# Patient Record
Sex: MALE | Race: WHITE | NOT HISPANIC OR LATINO | Employment: FULL TIME | URBAN - METROPOLITAN AREA
[De-identification: names, ages, dates, MRNs, and addresses within clinical notes are randomized per-mention and may not be internally consistent; named-entity substitution may affect disease eponyms.]

---

## 2019-07-13 PROBLEM — E55.9 VITAMIN D DEFICIENCY: Status: ACTIVE | Noted: 2019-07-13

## 2019-07-16 ENCOUNTER — OFFICE VISIT (OUTPATIENT)
Dept: FAMILY MEDICINE CLINIC | Facility: CLINIC | Age: 54
End: 2019-07-16

## 2019-07-16 VITALS
HEART RATE: 87 BPM | DIASTOLIC BLOOD PRESSURE: 87 MMHG | BODY MASS INDEX: 27.81 KG/M2 | WEIGHT: 187.8 LBS | RESPIRATION RATE: 16 BRPM | OXYGEN SATURATION: 98 % | SYSTOLIC BLOOD PRESSURE: 137 MMHG | TEMPERATURE: 98.1 F | HEIGHT: 69 IN

## 2019-07-16 DIAGNOSIS — K64.4 SKIN TAGS, ANUS OR RECTUM: ICD-10-CM

## 2019-07-16 DIAGNOSIS — Z12.11 SCREENING FOR MALIGNANT NEOPLASM OF COLON: ICD-10-CM

## 2019-07-16 DIAGNOSIS — Z13.1 SCREENING FOR DIABETES MELLITUS: ICD-10-CM

## 2019-07-16 DIAGNOSIS — E55.9 VITAMIN D DEFICIENCY: ICD-10-CM

## 2019-07-16 DIAGNOSIS — Z13.220 SCREENING CHOLESTEROL LEVEL: ICD-10-CM

## 2019-07-16 DIAGNOSIS — Z12.5 SCREENING FOR MALIGNANT NEOPLASM OF PROSTATE: ICD-10-CM

## 2019-07-16 DIAGNOSIS — Z11.59 ENCOUNTER FOR HEPATITIS C VIRUS SCREENING TEST FOR HIGH RISK PATIENT: ICD-10-CM

## 2019-07-16 DIAGNOSIS — Z11.4 SCREENING FOR HIV (HUMAN IMMUNODEFICIENCY VIRUS): Primary | ICD-10-CM

## 2019-07-16 DIAGNOSIS — Z91.89 ENCOUNTER FOR HEPATITIS C VIRUS SCREENING TEST FOR HIGH RISK PATIENT: ICD-10-CM

## 2019-07-16 PROCEDURE — 99203 OFFICE O/P NEW LOW 30 MIN: CPT | Performed by: PREVENTIVE MEDICINE

## 2019-07-16 RX ORDER — AMLODIPINE BESYLATE 10 MG/1
10 TABLET ORAL DAILY
Refills: 5 | COMMUNITY
Start: 2019-06-22

## 2019-07-16 RX ORDER — ALLOPURINOL 100 MG/1
100 TABLET ORAL DAILY
Refills: 6 | COMMUNITY
Start: 2019-06-22

## 2019-07-16 RX ORDER — LISINOPRIL 40 MG/1
40 TABLET ORAL DAILY
Refills: 5 | COMMUNITY
Start: 2019-06-22

## 2019-07-16 NOTE — PROGRESS NOTES
"Subjective   John Morales is a 53 y.o. male presents for   Chief Complaint   Patient presents with   • GI Problem     wants referral to Dr Kennedy       Health Maintenance Due   Topic Date Due   • ANNUAL PHYSICAL  10/01/1967   • ZOSTER VACCINE (1 of 2) 10/01/2014   • HEPATITIS C SCREENING  06/26/2019   • COLONOSCOPY  06/26/2019   • PROSTATE CANCER SCREENING  07/13/2019       History of Present Illness     Vitals:    07/16/19 1440 07/16/19 1442   BP: 145/93 137/87   BP Location: Left arm Right arm   Patient Position: Sitting Sitting   Cuff Size: Adult Adult   Pulse: 87    Resp: 16    Temp: 98.1 °F (36.7 °C)    TempSrc: Oral    SpO2: 98%    Weight: 85.2 kg (187 lb 12.8 oz)    Height: 175.3 cm (69\")        Current Outpatient Medications on File Prior to Visit   Medication Sig Dispense Refill   • allopurinol (ZYLOPRIM) 100 MG tablet Take 100 mg by mouth Daily.  6   • amLODIPine (NORVASC) 10 MG tablet Take 10 mg by mouth Daily.  5   • lisinopril (PRINIVIL,ZESTRIL) 40 MG tablet Take 40 mg by mouth Daily.  5     No current facility-administered medications on file prior to visit.        The following portions of the patient's history were reviewed and updated as appropriate: allergies, current medications, past family history, past medical history, past social history, past surgical history and problem list.    Review of Systems   Constitutional: Negative.    HENT: Negative.  Negative for sinus pressure and sore throat.    Eyes: Negative.    Respiratory: Negative.  Negative for cough.    Cardiovascular: Negative.    Gastrointestinal: Positive for rectal pain.   Endocrine: Negative.    Genitourinary: Negative.    Musculoskeletal: Negative.    Skin: Negative.    Allergic/Immunologic: Positive for environmental allergies.   Neurological: Negative.    Hematological: Negative.    Psychiatric/Behavioral: Negative.        Objective   Physical Exam   Constitutional: He is oriented to person, place, and time. He appears " pt has heart rate of 115 well-developed and well-nourished.   HENT:   Head: Normocephalic and atraumatic.   Eyes: Conjunctivae and EOM are normal. Pupils are equal, round, and reactive to light.   Neck: Normal range of motion. Neck supple.   Cardiovascular: Normal rate, regular rhythm, normal heart sounds and intact distal pulses.   Pulmonary/Chest: Effort normal and breath sounds normal.   Abdominal: Soft. Bowel sounds are normal.   Musculoskeletal: Normal range of motion.   Neurological: He is alert and oriented to person, place, and time.   Skin: Skin is warm and dry.   Papilloma rectal and L groin adenopathy   Psychiatric: He has a normal mood and affect.   Nursing note and vitals reviewed.    PHQ-9 Total Score: 0    Assessment/Plan   John was seen today for gi problem.    Diagnoses and all orders for this visit:    Screening for HIV (human immunodeficiency virus)  -     HIV-1 & HIV-2 Antibodies    Encounter for hepatitis C virus screening test for high risk patient  -     Hepatitis C Antibody    Screening for malignant neoplasm of prostate  -     PSA Screen    Screening for malignant neoplasm of colon    Screening for diabetes mellitus  -     Comprehensive Metabolic Panel    Screening cholesterol level  -     Lipid Panel    Vitamin D deficiency  -     Vitamin D 25 Hydroxy        Patient Instructions   Followup General Surgeon for rectal tags and L groin swelling.  Recheck 3 months

## 2019-07-24 ENCOUNTER — OFFICE VISIT (OUTPATIENT)
Dept: SURGERY | Facility: CLINIC | Age: 54
End: 2019-07-24

## 2019-07-24 VITALS
SYSTOLIC BLOOD PRESSURE: 177 MMHG | TEMPERATURE: 98.3 F | HEART RATE: 90 BPM | OXYGEN SATURATION: 98 % | DIASTOLIC BLOOD PRESSURE: 102 MMHG | BODY MASS INDEX: 30.08 KG/M2 | HEIGHT: 66 IN | WEIGHT: 187.2 LBS

## 2019-07-24 DIAGNOSIS — K64.4 SKIN TAG OF PERIANAL REGION: Primary | ICD-10-CM

## 2019-07-24 PROCEDURE — 99204 OFFICE O/P NEW MOD 45 MIN: CPT | Performed by: SURGERY

## 2019-07-24 NOTE — PROGRESS NOTES
GENERAL SURGERY CONSULTATION NOTE    Consult requested by: Dr. Sterling    Patient Care Team:  Nora Sterling MD as PCP - General (Family Medicine)    Reason for consult: perianal skin tags    Subjective     Patient is a 53 y.o. male presents with perianal skin tags which have been present for the past couple of years. He states that was when he noticed the largest of these skin tags. A couple of the perianal skin tags have arisen within the last few months. He reports that occasionally he will have puss draining from these areas and will have bleeding both when having a BM and with wiping. He reports that he was seen by a Dermatologist last year who offered biopsy to rule out cancer, but this was not done. The patient has never had a diagnosis of HPV and does not engage in receptive anal intercourse. He is able to pass his bowels without difficulty. He has not had any bloating or changes in the character of his BMs. The patient also noted a small lump within his left inguinal crease. The patient is also in need of a screening colonoscopy as he has not had one and is 53 years old. Negative family history for cancer.    Review of Systems   Constitutional: Negative for appetite change, chills and fever.   HENT: Negative for congestion and sore throat.    Respiratory: Negative for cough and shortness of breath.    Cardiovascular: Negative for chest pain and palpitations.   Gastrointestinal: Positive for anal bleeding and rectal pain. Negative for abdominal pain, constipation, diarrhea, nausea, vomiting and GERD.   Genitourinary: Negative for difficulty urinating, dysuria and frequency.   Musculoskeletal: Negative for arthralgias and back pain.   Skin: Positive for skin lesions. Negative for rash.   Neurological: Negative for dizziness, seizures and memory problem.   Hematological: Positive for adenopathy. Does not bruise/bleed easily.   Psychiatric/Behavioral: Negative for sleep disturbance and depressed mood.         History  Past Medical History:   Diagnosis Date   • Ankle fracture     right ankle   • Back injury 07/01/2007    broke   • Hypertension      Past Surgical History:   Procedure Laterality Date   • ARM WOUND REPAIR / CLOSURE Left 2014    infection in bone     Family History   Problem Relation Age of Onset   • No Known Problems Mother    • No Known Problems Father    • No Known Problems Sister    • No Known Problems Brother      Social History     Tobacco Use   • Smoking status: Never Smoker   • Smokeless tobacco: Never Used   Substance Use Topics   • Alcohol use: No     Frequency: 2-4 times a month     Drinks per session: 5 or 6     Binge frequency: Never   • Drug use: No       (Not in a hospital admission)  Allergies:  Patient has no known allergies.    Objective     Vital Signs  Temp:  [98.3 °F (36.8 °C)] 98.3 °F (36.8 °C)  Heart Rate:  [90] 90  BP: (177)/(102) 177/102    Physical Exam   Constitutional: He is oriented to person, place, and time. He appears well-developed and well-nourished.   HENT:   Head: Normocephalic and atraumatic.   Eyes: Pupils are equal, round, and reactive to light.   Neck: Normal range of motion.   Cardiovascular: Normal rate and regular rhythm.   Pulmonary/Chest: Effort normal and breath sounds normal.   Abdominal: Soft. He exhibits no distension. There is no tenderness. No hernia.   Genitourinary: Rectal exam shows no mass, anal tone normal and guaiac negative stool.   Genitourinary Comments: Patient has multiple skin tags of various sizes, some are pedunculated, some are more sessile. These are flesh colored, no ulcerations, bleeding or pus noted.    Musculoskeletal: Normal range of motion. He exhibits no edema.   Lymphadenopathy:     He has no cervical adenopathy.     He has no axillary adenopathy.        Left: No inguinal adenopathy present.   Shotty left inguinal lymphadenopathy   Neurological: He is alert and oriented to person, place, and time.   Skin: Skin is warm and dry.  No rash noted.   Psychiatric: He has a normal mood and affect.       Results Review:   Lab Results (last 24 hours)     ** No results found for the last 24 hours. **        No radiology results for the last day      I reviewed the patient's new imaging results and agree with the interpretation.  I reviewed the patient's other test results and agree with the interpretation    Assessment/Plan     Active Problems:  Perianal skin tags    Refer to GI for screening colonoscopy.    Hopefully during his screening colonoscopy I can perform an excisional biopsy on these skin tags. Discussed with patient that I have low suspicion for anal cancer given their appearance, but biopsy is the only way to definitively rule out anal cancer. He does have some inguinal lymphadenopathy which is non-specific. The risks, benefits, and alternatives to excision was discussed with the patient who agrees to proceed.    Robert Stinson MD  07/24/19  1:35 PM

## 2019-07-24 NOTE — PATIENT INSTRUCTIONS
Will attempt excision at same time as colonoscopy. If unable to coordinate, may need 2 separate procedures.

## 2019-07-31 ENCOUNTER — TELEPHONE (OUTPATIENT)
Dept: SURGERY | Facility: CLINIC | Age: 54
End: 2019-07-31

## 2019-07-31 RX ORDER — SODIUM CHLORIDE 9 MG/ML
100 INJECTION, SOLUTION INTRAVENOUS CONTINUOUS
Status: CANCELLED | OUTPATIENT
Start: 2019-07-31

## 2019-07-31 RX ORDER — CHLORHEXIDINE GLUCONATE 0.12 MG/ML
15 RINSE ORAL EVERY 12 HOURS SCHEDULED
Status: CANCELLED | OUTPATIENT
Start: 2019-07-31

## 2019-07-31 NOTE — TELEPHONE ENCOUNTER
Patient stopped in to inquire about his surgery to be scheduled, he has a green packet for the GI referral.  Can you put in a case request for your portion of the combined case.   Thanks    Done  Robert Stinson MD  7/31/2019  7:05 PM

## 2019-08-07 PROBLEM — K64.4 SKIN TAG OF PERIANAL REGION: Status: ACTIVE | Noted: 2019-08-07

## 2019-09-19 ENCOUNTER — APPOINTMENT (OUTPATIENT)
Dept: PREADMISSION TESTING | Facility: HOSPITAL | Age: 54
End: 2019-09-19

## 2019-09-19 VITALS
OXYGEN SATURATION: 98 % | DIASTOLIC BLOOD PRESSURE: 94 MMHG | SYSTOLIC BLOOD PRESSURE: 154 MMHG | HEIGHT: 67 IN | WEIGHT: 190 LBS | HEART RATE: 78 BPM | BODY MASS INDEX: 29.82 KG/M2

## 2019-09-19 LAB
ANION GAP SERPL CALCULATED.3IONS-SCNC: 13.8 MMOL/L (ref 5–15)
BUN BLD-MCNC: 11 MG/DL (ref 8–20)
BUN/CREAT SERPL: 12.2 (ref 6.2–20.3)
CALCIUM SPEC-SCNC: 9.2 MG/DL (ref 8.9–10.3)
CHLORIDE SERPL-SCNC: 103 MMOL/L (ref 101–111)
CO2 SERPL-SCNC: 25 MMOL/L (ref 22–32)
CREAT BLD-MCNC: 0.9 MG/DL (ref 0.7–1.2)
DEPRECATED RDW RBC AUTO: 41.6 FL (ref 37–54)
ERYTHROCYTE [DISTWIDTH] IN BLOOD BY AUTOMATED COUNT: 13.1 % (ref 12.3–15.4)
GFR SERPL CREATININE-BSD FRML MDRD: 88 ML/MIN/1.73
GLUCOSE BLD-MCNC: 94 MG/DL (ref 65–99)
HCT VFR BLD AUTO: 44.9 % (ref 37.5–51)
HGB BLD-MCNC: 15.2 G/DL (ref 13–17.7)
MCH RBC QN AUTO: 30.1 PG (ref 26.6–33)
MCHC RBC AUTO-ENTMCNC: 33.7 G/DL (ref 31.5–35.7)
MCV RBC AUTO: 89.2 FL (ref 79–97)
PLATELET # BLD AUTO: 176 10*3/MM3 (ref 140–450)
PMV BLD AUTO: 9 FL (ref 6–12)
POTASSIUM BLD-SCNC: 3.8 MMOL/L (ref 3.6–5.1)
RBC # BLD AUTO: 5.03 10*6/MM3 (ref 4.14–5.8)
SODIUM BLD-SCNC: 138 MMOL/L (ref 136–144)
WBC NRBC COR # BLD: 11.2 10*3/MM3 (ref 3.4–10.8)

## 2019-09-19 PROCEDURE — 93005 ELECTROCARDIOGRAM TRACING: CPT

## 2019-09-19 PROCEDURE — 93010 ELECTROCARDIOGRAM REPORT: CPT | Performed by: INTERNAL MEDICINE

## 2019-09-19 PROCEDURE — 36415 COLL VENOUS BLD VENIPUNCTURE: CPT

## 2019-09-19 PROCEDURE — 80048 BASIC METABOLIC PNL TOTAL CA: CPT | Performed by: SURGERY

## 2019-09-19 PROCEDURE — 85027 COMPLETE CBC AUTOMATED: CPT | Performed by: SURGERY

## 2019-09-23 PROBLEM — R19.4 CHANGE IN BOWEL HABITS: Status: ACTIVE | Noted: 2019-09-23

## 2019-09-25 ENCOUNTER — ANESTHESIA EVENT (OUTPATIENT)
Dept: PERIOP | Facility: HOSPITAL | Age: 54
End: 2019-09-25

## 2019-09-26 ENCOUNTER — ANESTHESIA (OUTPATIENT)
Dept: PERIOP | Facility: HOSPITAL | Age: 54
End: 2019-09-26

## 2019-09-26 ENCOUNTER — ON CAMPUS - OUTPATIENT (OUTPATIENT)
Dept: URBAN - METROPOLITAN AREA HOSPITAL 85 | Facility: HOSPITAL | Age: 54
End: 2019-09-26

## 2019-09-26 ENCOUNTER — HOSPITAL ENCOUNTER (OUTPATIENT)
Facility: HOSPITAL | Age: 54
Setting detail: HOSPITAL OUTPATIENT SURGERY
Discharge: HOME | End: 2019-09-26
Attending: SURGERY | Admitting: INTERNAL MEDICINE

## 2019-09-26 VITALS
BODY MASS INDEX: 29 KG/M2 | RESPIRATION RATE: 16 BRPM | HEIGHT: 67 IN | HEART RATE: 84 BPM | WEIGHT: 184.75 LBS | OXYGEN SATURATION: 92 % | SYSTOLIC BLOOD PRESSURE: 158 MMHG | TEMPERATURE: 98.4 F | DIASTOLIC BLOOD PRESSURE: 96 MMHG

## 2019-09-26 DIAGNOSIS — K64.4 RESIDUAL HEMORRHOIDAL SKIN TAGS: ICD-10-CM

## 2019-09-26 DIAGNOSIS — K63.5 COLON POLYP: ICD-10-CM

## 2019-09-26 DIAGNOSIS — R19.4 CHANGE IN BOWEL HABIT: ICD-10-CM

## 2019-09-26 DIAGNOSIS — D12.3 BENIGN NEOPLASM OF TRANSVERSE COLON: ICD-10-CM

## 2019-09-26 DIAGNOSIS — K64.4 SKIN TAG OF PERIANAL REGION: ICD-10-CM

## 2019-09-26 PROCEDURE — 46220 EXCISE ANAL EXT TAG/PAPILLA: CPT | Performed by: SURGERY

## 2019-09-26 PROCEDURE — 88304 TISSUE EXAM BY PATHOLOGIST: CPT | Performed by: SURGERY

## 2019-09-26 PROCEDURE — 25010000002 PROPOFOL 10 MG/ML EMULSION: Performed by: ANESTHESIOLOGIST ASSISTANT

## 2019-09-26 PROCEDURE — 45385 COLONOSCOPY W/LESION REMOVAL: CPT | Performed by: INTERNAL MEDICINE

## 2019-09-26 PROCEDURE — S0260 H&P FOR SURGERY: HCPCS | Performed by: SURGERY

## 2019-09-26 PROCEDURE — 88305 TISSUE EXAM BY PATHOLOGIST: CPT | Performed by: INTERNAL MEDICINE

## 2019-09-26 RX ORDER — SODIUM CHLORIDE 0.9 % (FLUSH) 0.9 %
3 SYRINGE (ML) INJECTION EVERY 12 HOURS SCHEDULED
Status: DISCONTINUED | OUTPATIENT
Start: 2019-09-26 | End: 2019-09-26 | Stop reason: HOSPADM

## 2019-09-26 RX ORDER — PROPOFOL 10 MG/ML
VIAL (ML) INTRAVENOUS AS NEEDED
Status: DISCONTINUED | OUTPATIENT
Start: 2019-09-26 | End: 2019-09-26 | Stop reason: SURG

## 2019-09-26 RX ORDER — HYDRALAZINE HYDROCHLORIDE 20 MG/ML
5 INJECTION INTRAMUSCULAR; INTRAVENOUS
Status: DISCONTINUED | OUTPATIENT
Start: 2019-09-26 | End: 2019-09-26 | Stop reason: HOSPADM

## 2019-09-26 RX ORDER — MEPERIDINE HYDROCHLORIDE 25 MG/ML
12.5 INJECTION INTRAMUSCULAR; INTRAVENOUS; SUBCUTANEOUS
Status: DISCONTINUED | OUTPATIENT
Start: 2019-09-26 | End: 2019-09-26 | Stop reason: HOSPADM

## 2019-09-26 RX ORDER — HYDROCODONE BITARTRATE AND ACETAMINOPHEN 5; 325 MG/1; MG/1
1 TABLET ORAL EVERY 4 HOURS PRN
Qty: 30 TABLET | Refills: 0 | Status: SHIPPED | OUTPATIENT
Start: 2019-09-26

## 2019-09-26 RX ORDER — ALBUTEROL SULFATE 2.5 MG/3ML
2.5 SOLUTION RESPIRATORY (INHALATION) ONCE AS NEEDED
Status: DISCONTINUED | OUTPATIENT
Start: 2019-09-26 | End: 2019-09-26 | Stop reason: HOSPADM

## 2019-09-26 RX ORDER — NEOMYCIN SULFATE, POLYMYXIN B SULFATE AND BACITRACIN ZINC 3.5; 10000; 4 MG/G; [USP'U]/G; [USP'U]/G
OINTMENT OPHTHALMIC AS NEEDED
Status: DISCONTINUED | OUTPATIENT
Start: 2019-09-26 | End: 2019-09-26 | Stop reason: HOSPADM

## 2019-09-26 RX ORDER — SODIUM CHLORIDE 9 MG/ML
9 INJECTION, SOLUTION INTRAVENOUS CONTINUOUS PRN
Status: DISCONTINUED | OUTPATIENT
Start: 2019-09-26 | End: 2019-09-26

## 2019-09-26 RX ORDER — PROMETHAZINE HYDROCHLORIDE 25 MG/ML
6.25 INJECTION, SOLUTION INTRAMUSCULAR; INTRAVENOUS ONCE AS NEEDED
Status: DISCONTINUED | OUTPATIENT
Start: 2019-09-26 | End: 2019-09-26 | Stop reason: HOSPADM

## 2019-09-26 RX ORDER — EPHEDRINE SULFATE 50 MG/ML
5 INJECTION, SOLUTION INTRAVENOUS ONCE AS NEEDED
Status: DISCONTINUED | OUTPATIENT
Start: 2019-09-26 | End: 2019-09-26 | Stop reason: HOSPADM

## 2019-09-26 RX ORDER — ONDANSETRON 2 MG/ML
4 INJECTION INTRAMUSCULAR; INTRAVENOUS ONCE AS NEEDED
Status: DISCONTINUED | OUTPATIENT
Start: 2019-09-26 | End: 2019-09-26 | Stop reason: HOSPADM

## 2019-09-26 RX ORDER — SODIUM CHLORIDE 0.9 % (FLUSH) 0.9 %
3-10 SYRINGE (ML) INJECTION AS NEEDED
Status: DISCONTINUED | OUTPATIENT
Start: 2019-09-26 | End: 2019-09-26 | Stop reason: HOSPADM

## 2019-09-26 RX ORDER — SODIUM CHLORIDE 9 MG/ML
100 INJECTION, SOLUTION INTRAVENOUS CONTINUOUS
Status: DISCONTINUED | OUTPATIENT
Start: 2019-09-26 | End: 2019-09-26 | Stop reason: HOSPADM

## 2019-09-26 RX ORDER — LIDOCAINE HYDROCHLORIDE 10 MG/ML
INJECTION, SOLUTION EPIDURAL; INFILTRATION; INTRACAUDAL; PERINEURAL AS NEEDED
Status: DISCONTINUED | OUTPATIENT
Start: 2019-09-26 | End: 2019-09-26 | Stop reason: SURG

## 2019-09-26 RX ORDER — LABETALOL HYDROCHLORIDE 5 MG/ML
5 INJECTION, SOLUTION INTRAVENOUS
Status: DISCONTINUED | OUTPATIENT
Start: 2019-09-26 | End: 2019-09-26 | Stop reason: HOSPADM

## 2019-09-26 RX ORDER — CHLORHEXIDINE GLUCONATE 0.12 MG/ML
15 RINSE ORAL EVERY 12 HOURS SCHEDULED
Status: DISCONTINUED | OUTPATIENT
Start: 2019-09-26 | End: 2019-09-26 | Stop reason: HOSPADM

## 2019-09-26 RX ORDER — PROMETHAZINE HYDROCHLORIDE 25 MG/1
25 TABLET ORAL ONCE AS NEEDED
Status: DISCONTINUED | OUTPATIENT
Start: 2019-09-26 | End: 2019-09-26 | Stop reason: HOSPADM

## 2019-09-26 RX ORDER — SIMETHICONE 20 MG/.3ML
EMULSION ORAL AS NEEDED
Status: DISCONTINUED | OUTPATIENT
Start: 2019-09-26 | End: 2019-09-26 | Stop reason: HOSPADM

## 2019-09-26 RX ORDER — LIDOCAINE 50 MG/G
OINTMENT TOPICAL
Qty: 50 G | Refills: 2 | Status: SHIPPED | OUTPATIENT
Start: 2019-09-26 | End: 2021-07-02

## 2019-09-26 RX ORDER — PROMETHAZINE HYDROCHLORIDE 25 MG/1
25 SUPPOSITORY RECTAL ONCE AS NEEDED
Status: DISCONTINUED | OUTPATIENT
Start: 2019-09-26 | End: 2019-09-26 | Stop reason: HOSPADM

## 2019-09-26 RX ORDER — SODIUM CHLORIDE 9 MG/ML
9 INJECTION, SOLUTION INTRAVENOUS CONTINUOUS PRN
Status: DISCONTINUED | OUTPATIENT
Start: 2019-09-26 | End: 2019-09-26 | Stop reason: HOSPADM

## 2019-09-26 RX ORDER — METOPROLOL TARTRATE 5 MG/5ML
INJECTION INTRAVENOUS AS NEEDED
Status: DISCONTINUED | OUTPATIENT
Start: 2019-09-26 | End: 2019-09-26 | Stop reason: SURG

## 2019-09-26 RX ORDER — KETOROLAC TROMETHAMINE 15 MG/ML
15 INJECTION, SOLUTION INTRAMUSCULAR; INTRAVENOUS EVERY 6 HOURS PRN
Status: DISCONTINUED | OUTPATIENT
Start: 2019-09-26 | End: 2019-09-26 | Stop reason: HOSPADM

## 2019-09-26 RX ADMIN — PROPOFOL 50 MCG/KG/MIN: 10 INJECTION, EMULSION INTRAVENOUS at 09:44

## 2019-09-26 RX ADMIN — CEFAZOLIN SODIUM 2 G: 1 INJECTION, POWDER, FOR SOLUTION INTRAMUSCULAR; INTRAVENOUS at 09:29

## 2019-09-26 RX ADMIN — METOPROLOL TARTRATE 2 MG: 5 INJECTION INTRAVENOUS at 10:06

## 2019-09-26 RX ADMIN — PROPOFOL 120 MG: 10 INJECTION, EMULSION INTRAVENOUS at 09:41

## 2019-09-26 RX ADMIN — LIDOCAINE HYDROCHLORIDE 50 MG: 10 INJECTION, SOLUTION EPIDURAL; INFILTRATION; INTRACAUDAL; PERINEURAL at 09:41

## 2019-09-26 RX ADMIN — SODIUM CHLORIDE 100 ML/HR: 900 INJECTION, SOLUTION INTRAVENOUS at 08:21

## 2019-09-26 NOTE — H&P
GENERAL SURGERY CONSULTATION NOTE    Consult requested by: Dr. Sterling    Patient Care Team:  Nora Sterling MD as PCP - General (Family Medicine)  Giorgio Kennedy MD as Consulting Physician (Gastroenterology)    Reason for consult: perianal skin tags    Subjective     Patient is a 53 y.o. male presents with perianal skin tags which have been present for the past couple of years. He states that was when he noticed the largest of these skin tags. A couple of the perianal skin tags have arisen within the last few months. He reports that occasionally he will have puss draining from these areas and will have bleeding both when having a BM and with wiping. He reports that he was seen by a Dermatologist last year who offered biopsy to rule out cancer, but this was not done. The patient has never had a diagnosis of HPV and does not engage in receptive anal intercourse. He is able to pass his bowels without difficulty. He has not had any bloating or changes in the character of his BMs. The patient also noted a small lump within his left inguinal crease. The patient is also in need of a screening colonoscopy as he has not had one and is 53 years old. Negative family history for cancer.    Review of Systems   Constitutional: Negative for appetite change, chills and fever.   HENT: Negative for congestion and sore throat.    Respiratory: Negative for cough and shortness of breath.    Cardiovascular: Negative for chest pain and palpitations.   Gastrointestinal: Positive for anal bleeding and rectal pain. Negative for abdominal pain, constipation, diarrhea, nausea, vomiting and GERD.   Genitourinary: Negative for difficulty urinating, dysuria and frequency.   Musculoskeletal: Negative for arthralgias and back pain.   Skin: Positive for skin lesions. Negative for rash.   Neurological: Negative for dizziness, seizures and memory problem.   Hematological: Positive for adenopathy. Does not bruise/bleed easily.    Psychiatric/Behavioral: Negative for sleep disturbance and depressed mood.        History  Past Medical History:   Diagnosis Date   • Ankle fracture     right ankle   • Arthritis     Gout   • Back injury 07/01/2007    broke   • Hypertension      Past Surgical History:   Procedure Laterality Date   • ARM WOUND REPAIR / CLOSURE Left 2014    infection in bone     Family History   Problem Relation Age of Onset   • No Known Problems Mother    • No Known Problems Father    • No Known Problems Sister    • No Known Problems Brother      Social History     Tobacco Use   • Smoking status: Never Smoker   • Smokeless tobacco: Never Used   Substance Use Topics   • Alcohol use: No     Frequency: 2-4 times a month     Drinks per session: 5 or 6     Binge frequency: Never   • Drug use: No     Medications Prior to Admission   Medication Sig Dispense Refill Last Dose   • allopurinol (ZYLOPRIM) 100 MG tablet Take 100 mg by mouth Daily.  6 Past Week at Unknown time   • amLODIPine (NORVASC) 10 MG tablet Take 10 mg by mouth Daily.  5 9/24/2019   • lisinopril (PRINIVIL,ZESTRIL) 40 MG tablet Take 40 mg by mouth Daily.  5 9/24/2019     Allergies:  Patient has no known allergies.    Objective     Vital Signs  Temp:  [97.8 °F (36.6 °C)] 97.8 °F (36.6 °C)  Heart Rate:  [90] 90  Resp:  [12] 12  BP: (132)/(95) 132/95    Physical Exam   Constitutional: He is oriented to person, place, and time. He appears well-developed and well-nourished.   HENT:   Head: Normocephalic and atraumatic.   Eyes: Pupils are equal, round, and reactive to light.   Neck: Normal range of motion.   Cardiovascular: Normal rate and regular rhythm.   Pulmonary/Chest: Effort normal and breath sounds normal.   Abdominal: Soft. He exhibits no distension. There is no tenderness. No hernia.   Genitourinary: Rectal exam shows no mass, anal tone normal and guaiac negative stool.   Genitourinary Comments: Patient has multiple skin tags of various sizes, some are pedunculated, some  are more sessile. These are flesh colored, no ulcerations, bleeding or pus noted.    Musculoskeletal: Normal range of motion. He exhibits no edema.   Lymphadenopathy:     He has no cervical adenopathy.     He has no axillary adenopathy.        Left: No inguinal adenopathy present.   Shotty left inguinal lymphadenopathy   Neurological: He is alert and oriented to person, place, and time.   Skin: Skin is warm and dry. No rash noted.   Psychiatric: He has a normal mood and affect.       Results Review:   Lab Results (last 24 hours)     ** No results found for the last 24 hours. **        No radiology results for the last day      I reviewed the patient's new imaging results and agree with the interpretation.  I reviewed the patient's other test results and agree with the interpretation    Assessment/Plan     Active Problems:  Perianal skin tags    Plan for excision today while under sedation for screening colonoscopy.  Risks, benefits, and alternatives discussed with patient.  Will need to perform sitz baths following surgery. Discussed with him that he should anticipate discomfort for 7-10 days following excision.    Robert Stinson MD  09/26/19  8:57 AM

## 2019-09-26 NOTE — OP NOTE
MASS SOFT TISSUE EXCISION  Operative Note    Patient Name:  John Morales  YOB: 1965    Date of Surgery:  9/26/2019     Indications: Patient is a 53-year-old gentleman who is never had a colonoscopy, but was noted to have perianal skin tags.  We discussed excision of his perianal skin tags in the office, during the anesthesia encounter of his colonoscopy.  The patient agreed to proceed.    Pre-op Diagnosis:   Skin tag of perianal region [K64.4]    Post-op Diagnosis:  Post-Op Diagnosis Codes:     * Skin tag of perianal region [K64.4]    Procedure/CPT® Codes:      Procedure(s):  Excision of perianal skin tags  COLONOSCOPY WITH POLYPECTOMY X 2,    Staff:  Surgeon(s):  Sukhdev Silva MD McCormick, John Patrick, MD         Anesthesia: Monitored Anesthesia Care    Estimated Blood Loss: minimal    Implants:    Nothing was implanted during the procedure    Specimen:          Specimens     ID Source Type Tests Collected By Collected At Frozen?      A Large Intestine, Transverse Colon Polyp · TISSUE PATHOLOGY EXAM   Sukhdev Silva MD 9/26/19 0947 No     Description:  TRANVERSE COLON POLYP X 2    B Anus Tissue · TISSUE PATHOLOGY EXAM   Robert Stinson MD 9/26/19 1016 No     Description: PERIANAL SKIN TAGS          Findings: Prolapsing internal hemorrhoids, grade 3; perianal skin tags x3    Complications: None, immediately    Description of Procedure: After obtaining informed consent the patient was brought to the operating room and underwent uncomplicated induction of MAC anesthesia.  The patient then had a colonoscopy performed by Dr. Deleon.  For description of that procedure, please see his separately dictated colonoscopy report.  Once the colonoscopy had been completed, the patient received antibiotics, and the anus was prepped and draped in the usual sterile fashion.  The patient was noted to have large, prolapsing internal hemorrhoids which were grade 3.  We had  not discussed excision of his internal hemorrhoids prior to proceeding to the operating room today, and no family was available to discuss potential amendment to the consent.  The patient was asymptomatic from the hemorrhoids, however he was symptomatic from the perianal skin tags.  I therefore elected to excise the perianal skin tags.  After infiltrating the base of the 3 perianal skin tags with local anesthesia, I used a scalpel to sharply excise the skin tags at their base.  The skin tags appeared pedunculated with narrow stalks.  Once they were all sharply excised, the skin tags were sent together for pathology.  We then maintained hemostasis using electrocautery, and closed the cutaneous defects using 3-0 chromic suture.  The wound was dressed with bacitracin ointment.  The patient tolerated the procedure well and was taken to PACU in satisfactory condition.    Robert Stinson MD     Date: 9/26/2019  Time: 10:25 AM

## 2019-09-26 NOTE — ANESTHESIA POSTPROCEDURE EVALUATION
Patient: John Morales    Procedure Summary     Date:  09/26/19 Room / Location:  [facility] OR 09 / [facility] MAIN OR    Anesthesia Start:  0924 Anesthesia Stop:  1026    Procedures:       Excision of perianal skin tags (N/A )      COLONOSCOPY WITH POLYPECTOMY X 2, (N/A Rectum) Diagnosis:       Skin tag of perianal region      (Skin tag of perianal region [K64.4])    Surgeon:  Robert Stinson MD; Sukhdev Silva MD Provider:  Richard Santizo MD    Anesthesia Type:  MAC ASA Status:  2          Anesthesia Type: MAC  Last vitals  BP   158/96 (09/26/19 1117)   Temp   98.4 °F (36.9 °C) (09/26/19 1117)   Pulse   84 (09/26/19 1117)   Resp   16 (09/26/19 1117)     SpO2   92 % (09/26/19 1117)     Post Anesthesia Care and Evaluation    Patient location during evaluation: PACU  Patient participation: complete - patient participated  Level of consciousness: awake  Pain scale: See nurse's notes for pain score.  Pain management: adequate  Airway patency: patent  Anesthetic complications: No anesthetic complications  PONV Status: none  Cardiovascular status: acceptable  Respiratory status: acceptable  Hydration status: acceptable    Comments: Patient seen and examined postoperatively; vital signs stable; SpO2 greater than or equal to 90%; cardiopulmonary status stable; nausea/vomiting adequately controlled; pain adequately controlled; no apparent anesthesia complications; patient discharged from anesthesia care when discharge criteria were met

## 2019-09-26 NOTE — H&P
GI CONSULT  NOTE:    Referring Provider:    Nora Sterling MD  [unfilled]    Chief complaint: Change in bowel habits    History of present illness:      John Morales is a 53 y.o. male who presents today for Procedure(s):  Excision of perianal skin tags  COLONOSCOPY for the indications listed below.     The updated Patient Profile was reviewed prior to the procedure, in conjunction with the Physical Exam, including medical conditions, surgical procedures, medications, allergies, family history and social history.     Pre-operatively, I reviewed the indication(s) for the procedure, the risks of the procedure [including but not limited to: unexpected bleeding possibly requiring hospitalization and/or unplanned repeat procedures, perforation possibly requiring surgical treatment, missed lesions and complications of sedation/MAC (also explained by anesthesia staff)].     I have evaluated the patient for risks associated with the planned anesthesia and the procedure to be performed and find the patient an acceptable candidate for IV sedation.    Multiple opportunities were provided for any questions or concerns, and all questions were answered satisfactorily before any anesthesia was administered. We will proceed with the planned procedure.    Past Medical History:  Past Medical History:   Diagnosis Date   • Ankle fracture     right ankle   • Arthritis     Gout   • Back injury 07/01/2007    broronaldo   • Hypertension        Past Surgical History:  Past Surgical History:   Procedure Laterality Date   • ARM WOUND REPAIR / CLOSURE Left 2014    infection in bone       Social History:  Social History     Tobacco Use   • Smoking status: Never Smoker   • Smokeless tobacco: Never Used   Substance Use Topics   • Alcohol use: No     Frequency: 2-4 times a month     Drinks per session: 5 or 6     Binge frequency: Never   • Drug use: No       Family History:  Family History   Problem Relation Age of Onset   • No Known  Problems Mother    • No Known Problems Father    • No Known Problems Sister    • No Known Problems Brother        Medications:  Medications Prior to Admission   Medication Sig Dispense Refill Last Dose   • allopurinol (ZYLOPRIM) 100 MG tablet Take 100 mg by mouth Daily.  6 Taking   • amLODIPine (NORVASC) 10 MG tablet Take 10 mg by mouth Daily.  5 Taking   • lisinopril (PRINIVIL,ZESTRIL) 40 MG tablet Take 40 mg by mouth Daily.  5 Taking       Scheduled Meds:  Continuous Infusions:  No current facility-administered medications for this encounter.   PRN Meds:.    ALLERGIES:  Patient has no known allergies.    ROS:  The following systems were reviewed and negative;   Constitution:  No fevers, chills, no unintentional weight loss  Skin: no rash, no jaundice  Eyes:  No blurry vision, no eye pain  HENT:  No change in hearing or smell  Resp:  No dyspnea or cough  CV:  No chest pain or palpitations  :  No dysuria, hematuria  Musculoskeletal:  No leg cramps or arthralgias  Neuro:  No tremor, no numbness  Psych:  No depression or confsuion    Objective     Vital Signs:   There were no vitals filed for this visit.    Physical Exam:       General Appearance:    Awake and alert, in no acute distress   Head:    Normocephalic, without obvious abnormality, atraumatic   Throat:   No oral lesions, no thrush, oral mucosa moist   Lungs:     respirations regular, even and unlabored   Skin:   No rash, no jaundice       Results Review:  Lab Results (last 24 hours)     ** No results found for the last 24 hours. **          Imaging Results (last 24 hours)     ** No results found for the last 24 hours. **           I reviewed the patient's labs and imaging.    ASSESSMENT AND PLAN:      Principal Problem:    Change in bowel habits  Active Problems:    Skin tag of perianal region       Procedure(s):  Excision of perianal skin tags  COLONOSCOPY      I discussed the patients findings and my recommendations with the patient.    Sukhdev Alvares  MD Shira  09/26/19  6:40 AM

## 2019-09-26 NOTE — ANESTHESIA PREPROCEDURE EVALUATION
Anesthesia Evaluation     NPO Solid Status: > 8 hours  NPO Liquid Status: > 8 hours           Airway   Mallampati: II  TM distance: >3 FB  No difficulty expected  Dental - normal exam     Pulmonary    Cardiovascular   Exercise tolerance: good (4-7 METS)    (+) hypertension,       Neuro/Psych  GI/Hepatic/Renal/Endo      Musculoskeletal     Abdominal    Substance History      OB/GYN          Other   (+) arthritis                   Anesthesia Plan    ASA 2     MAC     intravenous induction   Anesthetic plan, all risks, benefits, and alternatives have been provided, discussed and informed consent has been obtained with: patient.    Plan discussed with [facility].

## 2019-09-26 NOTE — OP NOTE
COLONOSCOPY Procedure Report    Patient Name:  John Morales  YOB: 1965    Date of Surgery:  9/26/2019     Pre-Op Diagnosis:  Altered bowel function  Skin tag of perianal region [K64.4]        Post-Op Diagnosis Codes:  2 colon polyps removed with snare  Mild sigmoid colon diverticulosis  Large grade 2 internal hemorrhoids  External skin tags    Procedure/CPT® Codes:  Colonoscopy with snare polypectomy    Staff:  Sukhdev Silva MD         Anesthesia: Monitored Anesthesia Care    Implants:    Nothing was implanted during the procedure    Specimen:        See below    Complications:  None    Description of Procedure:  Informed consent was obtained for the procedure, including sedation.  Risks of perforation, hemorrhage, adverse drug reaction and aspiration were discussed.  The patient was brought into the endoscopy suite. Continuous cardiopulmonary monitoring was performed.  The patient was placed in the left lateral decubitus position. After adequate sedation was attained, the digital rectal exam was performed which was normal.  Subsequently, the Olympus colonoscope was inserted into the patient's rectum and advanced to the level of the cecum and terminal ileum without difficulty.  The bowel prep was excellent.  Circumferential examination of the patient's colon was performed on scope withdrawal.  The cecum, ascending colon, and hepatic flexure were examined twice.  The transverse colon, splenic flexure, descending colon, and rectum were examined.  A retroflex exam was performed in the rectum.  The bowel was decompressed, the scope was withdrawn from the patient, and the patient tolerated the procedure well. There were no immediate post-operative complications.     Findings:   Terminal ileum: Normal mucosa  Colon: 2 small, sessile, benign-appearing polyps in the transverse colon ranging in size from 4 to 5 mm removed with cold snare single piece polypectomy and retrieved.  Mild sigmoid colon  diverticulosis without diverticulitis or bleeding  Grade 2 internal hemorrhoids  External skin tags    Impression:  53-year-old male with colonoscopy showing 2 small polyps, internal/external hemorrhoids, and mild sigmoid colon diverticulosis    Recommendations:  Follow-up on pathology  If polyps are adenomatous repeat colonoscopy in 5 years otherwise 10  High-fiber diet  Proceed with excision of external skin tags      Sukhdev Silva MD     Date: 9/26/2019  Time: 10:00 AM

## 2019-09-26 NOTE — SIGNIFICANT NOTE
Offered to have security com e to lock up patient belongings.  Patient declined and just wanted valuables placed with clothes

## 2019-09-26 NOTE — DISCHARGE INSTRUCTIONS
Perform sitz baths after each BM and twice daily  Recommend eating a high fiber diet. Between 40-50 grams of fiber daily.  Consider over-the-counter supplementation with Benefiber and/or Metamucil.  Recommend drinking at least 100 ounces of water per day  Use witch hazel wipes and Preparation H as needed for burning/itching, and bleeding hemorrhoids.  Follow-up with me (Dr. Stinson) as scheduled to review pathology from perianal skin tags, and to discuss possible hemorrhoidectomy in the future.

## 2019-09-27 LAB
LAB AP CASE REPORT: NORMAL
LAB AP CASE REPORT: NORMAL
PATH REPORT.FINAL DX SPEC: NORMAL
PATH REPORT.FINAL DX SPEC: NORMAL
PATH REPORT.GROSS SPEC: NORMAL
PATH REPORT.GROSS SPEC: NORMAL

## 2019-10-07 ENCOUNTER — OFFICE VISIT (OUTPATIENT)
Dept: SURGERY | Facility: CLINIC | Age: 54
End: 2019-10-07

## 2019-10-07 VITALS
DIASTOLIC BLOOD PRESSURE: 100 MMHG | HEIGHT: 67 IN | BODY MASS INDEX: 29.44 KG/M2 | SYSTOLIC BLOOD PRESSURE: 161 MMHG | HEART RATE: 83 BPM | WEIGHT: 187.6 LBS | OXYGEN SATURATION: 99 % | TEMPERATURE: 98 F

## 2019-10-07 DIAGNOSIS — K64.4 SKIN TAG OF PERIANAL REGION: Primary | ICD-10-CM

## 2019-10-07 PROCEDURE — 99024 POSTOP FOLLOW-UP VISIT: CPT | Performed by: SURGERY

## 2019-10-07 NOTE — PROGRESS NOTES
"Post-op Note    Subjective   John Morales is a 54 y.o. male s/p excision of perianal skin tags. No pain. No rectal bleeding. No fevers. Having regular bowel function without constipation or diarrhea.      Objective   /100 (BP Location: Left arm, Patient Position: Sitting, Cuff Size: Adult)   Pulse 83   Temp 98 °F (36.7 °C) (Oral)   Ht 170.2 cm (67\")   Wt 85.1 kg (187 lb 9.6 oz)   SpO2 99%   BMI 29.38 kg/m²   Surgical incisions have pulled apart with out stitches still in place.  Wound base demonstrates healthy, pink appearing granulation tissue covered by fibrinous exudates.  No erythema, induration, or drainage to suggest infection.      Assessment/Plan   54-year-old gentleman with perianal skin tag status post excision.  Reviewed pathology with the patient which demonstrated benign findings.  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  Patient denies having any such symptoms.    We discussed that the patient has significant internal hemorrhoids, at this time however they are not symptomatic.  I recommend continued high-fiber diet, and the patient to follow-up with me if they become symptomatic in the future.    Continue follow-up with gastroenterology, as instructed. The patient had 2 tubular adenomas removed from his transverse colon on his colonoscopy.    Robert Stinson MD  10/7/2019  12:43 PM  "

## 2019-10-17 ENCOUNTER — OFFICE VISIT (OUTPATIENT)
Dept: FAMILY MEDICINE CLINIC | Facility: CLINIC | Age: 54
End: 2019-10-17

## 2019-10-17 VITALS
SYSTOLIC BLOOD PRESSURE: 172 MMHG | BODY MASS INDEX: 29.66 KG/M2 | OXYGEN SATURATION: 94 % | RESPIRATION RATE: 16 BRPM | DIASTOLIC BLOOD PRESSURE: 117 MMHG | TEMPERATURE: 97.9 F | HEART RATE: 71 BPM | WEIGHT: 189.4 LBS

## 2019-10-17 DIAGNOSIS — Z11.4 SCREENING FOR HIV (HUMAN IMMUNODEFICIENCY VIRUS): ICD-10-CM

## 2019-10-17 DIAGNOSIS — Z12.5 SCREENING FOR MALIGNANT NEOPLASM OF PROSTATE: ICD-10-CM

## 2019-10-17 DIAGNOSIS — Z11.59 ENCOUNTER FOR HEPATITIS C VIRUS SCREENING TEST FOR HIGH RISK PATIENT: ICD-10-CM

## 2019-10-17 DIAGNOSIS — Z91.89 ENCOUNTER FOR HEPATITIS C VIRUS SCREENING TEST FOR HIGH RISK PATIENT: ICD-10-CM

## 2019-10-17 DIAGNOSIS — R19.4 CHANGE IN BOWEL HABITS: ICD-10-CM

## 2019-10-17 DIAGNOSIS — E55.9 VITAMIN D DEFICIENCY: ICD-10-CM

## 2019-10-17 DIAGNOSIS — Z00.01 ENCOUNTER FOR ANNUAL GENERAL MEDICAL EXAMINATION WITH ABNORMAL FINDINGS IN ADULT: ICD-10-CM

## 2019-10-17 DIAGNOSIS — R03.0 ELEVATED BLOOD PRESSURE READING WITHOUT DIAGNOSIS OF HYPERTENSION: Primary | ICD-10-CM

## 2019-10-17 PROCEDURE — 36415 COLL VENOUS BLD VENIPUNCTURE: CPT | Performed by: PREVENTIVE MEDICINE

## 2019-10-17 PROCEDURE — 99213 OFFICE O/P EST LOW 20 MIN: CPT | Performed by: PREVENTIVE MEDICINE

## 2019-10-17 NOTE — PROGRESS NOTES
Subjective   John Morales is a 54 y.o. male presents for   Chief Complaint   Patient presents with   • Annual Exam     fasting but not able to get any blood    • Hypertension     no meds yet today    Mother is in hospice and is stressed so eel may drive Bp up.  No chest pain.    Health Maintenance Due   Topic Date Due   • HEPATITIS C SCREENING  06/26/2019   • PROSTATE CANCER SCREENING  07/13/2019       History of Present Illness     Vitals:    10/17/19 1500 10/17/19 1502   BP: 174/100 (!) 172/117   BP Location: Left arm Right arm   Patient Position: Sitting Sitting   Cuff Size: Adult Adult   Pulse: 71    Resp: 16    Temp: 97.9 °F (36.6 °C)    TempSrc: Oral    SpO2: 94%    Weight: 85.9 kg (189 lb 6.4 oz)      Body mass index is 29.66 kg/m².    Current Outpatient Medications on File Prior to Visit   Medication Sig Dispense Refill   • allopurinol (ZYLOPRIM) 100 MG tablet Take 100 mg by mouth Daily.  6   • amLODIPine (NORVASC) 10 MG tablet Take 10 mg by mouth Daily.  5   • lisinopril (PRINIVIL,ZESTRIL) 40 MG tablet Take 40 mg by mouth Daily.  5   • HYDROcodone-acetaminophen (NORCO) 5-325 MG per tablet Take 1 tablet by mouth Every 4 (Four) Hours As Needed for Mild Pain . 30 tablet 0   • lidocaine (XYLOCAINE) 5 % ointment Apply  topically to the appropriate area as directed Every 2 (Two) Hours As Needed for Mild Pain . 50 g 2     No current facility-administered medications on file prior to visit.        The following portions of the patient's history were reviewed and updated as appropriate: allergies, current medications, past family history, past medical history, past social history, past surgical history and problem list.    Review of Systems   Constitutional: Negative.    HENT: Negative.  Negative for sinus pressure and sore throat.    Eyes: Negative.    Respiratory: Negative.  Negative for cough.    Cardiovascular: Negative.    Gastrointestinal: Negative.    Endocrine: Negative.    Genitourinary: Negative.     Musculoskeletal: Negative.    Skin: Negative.    Allergic/Immunologic: Positive for environmental allergies.   Neurological: Positive for headache.   Hematological: Negative.    Psychiatric/Behavioral: Positive for dysphoric mood.       Objective   Physical Exam   Constitutional: He is oriented to person, place, and time. He appears well-developed and well-nourished.   HENT:   Head: Normocephalic and atraumatic.   Eyes: Conjunctivae and EOM are normal. Pupils are equal, round, and reactive to light.   Neck: Normal range of motion. Neck supple.   Cardiovascular: Normal rate, regular rhythm, normal heart sounds and intact distal pulses.   Pulmonary/Chest: Effort normal and breath sounds normal.   Abdominal: Soft. Bowel sounds are normal.   Musculoskeletal: Normal range of motion.   Neurological: He is alert and oriented to person, place, and time.   Skin: Skin is warm and dry.   Psychiatric: He has a normal mood and affect.   Nursing note and vitals reviewed.    PHQ-9 Total Score:      Assessment/Plan   John was seen today for annual exam and hypertension.    Diagnoses and all orders for this visit:    Elevated blood pressure reading without diagnosis of hypertension    Vitamin D deficiency    Change in bowel habits    Encounter for annual general medical examination with abnormal findings in adult    Screening for HIV (human immunodeficiency virus)    Encounter for hepatitis C virus screening test for high risk patient    Screening for malignant neoplasm of prostate        Patient Instructions   Send 6 blood pressures over next months

## 2019-10-18 ENCOUNTER — TELEPHONE (OUTPATIENT)
Dept: FAMILY MEDICINE CLINIC | Facility: CLINIC | Age: 54
End: 2019-10-18

## 2019-10-23 ENCOUNTER — TELEPHONE (OUTPATIENT)
Dept: FAMILY MEDICINE CLINIC | Facility: CLINIC | Age: 54
End: 2019-10-23

## 2019-10-23 NOTE — TELEPHONE ENCOUNTER
SPOKE WITH PT STATED UNDERSTOOD WILL CALL BACK TO [facility] APPT.

## 2019-10-23 NOTE — TELEPHONE ENCOUNTER
SINCE YOU REVIEWED THE WORK LABS PT HAD DOES HE STILL NEED TO DO THE ONES YOU ORDERED? PLEASE ADVISE.

## 2019-11-01 ENCOUNTER — CLINICAL SUPPORT (OUTPATIENT)
Dept: FAMILY MEDICINE CLINIC | Facility: CLINIC | Age: 54
End: 2019-11-01

## 2019-11-01 DIAGNOSIS — Z91.89 ENCOUNTER FOR HEPATITIS C VIRUS SCREENING TEST FOR HIGH RISK PATIENT: ICD-10-CM

## 2019-11-01 DIAGNOSIS — Z13.1 SCREENING FOR DIABETES MELLITUS: ICD-10-CM

## 2019-11-01 DIAGNOSIS — Z13.220 SCREENING CHOLESTEROL LEVEL: ICD-10-CM

## 2019-11-01 DIAGNOSIS — Z12.5 SCREENING FOR MALIGNANT NEOPLASM OF PROSTATE: ICD-10-CM

## 2019-11-01 DIAGNOSIS — Z11.59 ENCOUNTER FOR HEPATITIS C VIRUS SCREENING TEST FOR HIGH RISK PATIENT: ICD-10-CM

## 2019-11-01 DIAGNOSIS — E55.9 VITAMIN D DEFICIENCY: ICD-10-CM

## 2019-11-01 DIAGNOSIS — Z11.4 SCREENING FOR HIV (HUMAN IMMUNODEFICIENCY VIRUS): ICD-10-CM

## 2019-11-01 LAB
25(OH)D3 SERPL-MCNC: 11.7 NG/ML (ref 30–100)
ALBUMIN SERPL-MCNC: 4.3 G/DL (ref 3.5–5.2)
ALBUMIN/GLOB SERPL: 1.4 G/DL
ALP SERPL-CCNC: 85 U/L (ref 39–117)
ALT SERPL W P-5'-P-CCNC: 19 U/L (ref 1–41)
ANION GAP SERPL CALCULATED.3IONS-SCNC: 6.1 MMOL/L (ref 5–15)
AST SERPL-CCNC: 22 U/L (ref 1–40)
BILIRUB SERPL-MCNC: 0.7 MG/DL (ref 0.2–1.2)
BUN BLD-MCNC: 9 MG/DL (ref 6–20)
BUN/CREAT SERPL: 9.3 (ref 7–25)
CALCIUM SPEC-SCNC: 9.4 MG/DL (ref 8.6–10.5)
CHLORIDE SERPL-SCNC: 103 MMOL/L (ref 98–107)
CHOLEST SERPL-MCNC: 129 MG/DL (ref 0–200)
CO2 SERPL-SCNC: 29.9 MMOL/L (ref 22–29)
CREAT BLD-MCNC: 0.97 MG/DL (ref 0.76–1.27)
GFR SERPL CREATININE-BSD FRML MDRD: 80 ML/MIN/1.73
GLOBULIN UR ELPH-MCNC: 3 GM/DL
GLUCOSE BLD-MCNC: 95 MG/DL (ref 65–99)
HDLC SERPL-MCNC: 60 MG/DL (ref 40–60)
HIV1+2 AB SER QL: NORMAL
LDLC SERPL CALC-MCNC: 54 MG/DL (ref 0–100)
LDLC/HDLC SERPL: 0.9 {RATIO}
POTASSIUM BLD-SCNC: 4.5 MMOL/L (ref 3.5–5.2)
PROT SERPL-MCNC: 7.3 G/DL (ref 6–8.5)
PSA SERPL-MCNC: 1.83 NG/ML (ref 0–4)
SODIUM BLD-SCNC: 139 MMOL/L (ref 136–145)
TRIGL SERPL-MCNC: 75 MG/DL (ref 0–150)
VLDLC SERPL-MCNC: 15 MG/DL (ref 5–40)

## 2019-11-01 PROCEDURE — 82306 VITAMIN D 25 HYDROXY: CPT | Performed by: PREVENTIVE MEDICINE

## 2019-11-01 PROCEDURE — 80061 LIPID PANEL: CPT | Performed by: PREVENTIVE MEDICINE

## 2019-11-01 PROCEDURE — G0103 PSA SCREENING: HCPCS | Performed by: PREVENTIVE MEDICINE

## 2019-11-01 PROCEDURE — G0432 EIA HIV-1/HIV-2 SCREEN: HCPCS | Performed by: PREVENTIVE MEDICINE

## 2019-11-01 PROCEDURE — 86803 HEPATITIS C AB TEST: CPT | Performed by: PREVENTIVE MEDICINE

## 2019-11-01 PROCEDURE — 80053 COMPREHEN METABOLIC PANEL: CPT | Performed by: PREVENTIVE MEDICINE

## 2019-11-02 LAB — HCV AB SER DONR QL: NORMAL

## 2021-06-25 ENCOUNTER — HOSPITAL ENCOUNTER (EMERGENCY)
Facility: HOSPITAL | Age: 56
Discharge: HOME | End: 2021-06-25
Attending: EMERGENCY MEDICINE | Admitting: EMERGENCY MEDICINE

## 2021-06-25 VITALS
WEIGHT: 180 LBS | TEMPERATURE: 97.4 F | HEIGHT: 67 IN | BODY MASS INDEX: 28.25 KG/M2 | SYSTOLIC BLOOD PRESSURE: 147 MMHG | RESPIRATION RATE: 16 BRPM | DIASTOLIC BLOOD PRESSURE: 105 MMHG | HEART RATE: 81 BPM | OXYGEN SATURATION: 98 %

## 2021-06-25 DIAGNOSIS — B02.9 HERPES ZOSTER WITHOUT COMPLICATION: Primary | ICD-10-CM

## 2021-06-25 PROCEDURE — 99282 EMERGENCY DEPT VISIT SF MDM: CPT

## 2021-06-25 RX ORDER — VALACYCLOVIR HYDROCHLORIDE 1 G/1
1000 TABLET, FILM COATED ORAL 3 TIMES DAILY
Qty: 21 TABLET | Refills: 0 | Status: SHIPPED | OUTPATIENT
Start: 2021-06-25 | End: 2021-07-02

## 2021-06-25 RX ADMIN — FLUORESCEIN SODIUM: 1 STRIP OPHTHALMIC at 08:49

## 2021-07-02 ENCOUNTER — OFFICE VISIT (OUTPATIENT)
Dept: FAMILY MEDICINE CLINIC | Facility: CLINIC | Age: 56
End: 2021-07-02

## 2021-07-02 VITALS
TEMPERATURE: 97.7 F | HEART RATE: 85 BPM | SYSTOLIC BLOOD PRESSURE: 152 MMHG | HEIGHT: 67 IN | OXYGEN SATURATION: 97 % | WEIGHT: 181.4 LBS | DIASTOLIC BLOOD PRESSURE: 85 MMHG | BODY MASS INDEX: 28.47 KG/M2

## 2021-07-02 DIAGNOSIS — R03.0 ELEVATED BLOOD PRESSURE READING WITHOUT DIAGNOSIS OF HYPERTENSION: ICD-10-CM

## 2021-07-02 DIAGNOSIS — E55.9 VITAMIN D DEFICIENCY: ICD-10-CM

## 2021-07-02 DIAGNOSIS — Z13.220 SCREENING CHOLESTEROL LEVEL: ICD-10-CM

## 2021-07-02 DIAGNOSIS — Z00.01 ENCOUNTER FOR ANNUAL GENERAL MEDICAL EXAMINATION WITH ABNORMAL FINDINGS IN ADULT: Primary | ICD-10-CM

## 2021-07-02 DIAGNOSIS — M19.90 ARTHRITIS: ICD-10-CM

## 2021-07-02 DIAGNOSIS — R19.4 CHANGE IN BOWEL HABITS: ICD-10-CM

## 2021-07-02 DIAGNOSIS — R21 RASH OF FACE: ICD-10-CM

## 2021-07-02 DIAGNOSIS — Z12.5 SCREENING FOR PROSTATE CANCER: ICD-10-CM

## 2021-07-02 PROCEDURE — 99396 PREV VISIT EST AGE 40-64: CPT | Performed by: PREVENTIVE MEDICINE

## 2021-07-02 PROCEDURE — 99213 OFFICE O/P EST LOW 20 MIN: CPT | Performed by: PREVENTIVE MEDICINE

## 2021-07-02 NOTE — PROGRESS NOTES
"Subjective   John Morales is a 55 y.o. male presents for   Chief Complaint   Patient presents with   • Herpes Zoster     1 week f/u   Patient presents today for an annual wellness exam and has been advised to wear sunscreen and seatbelt.  Patient is also here to follow-up of multiple chronic health conditions most of which are stable with the exception of his blood pressure that is elevated today probably due to pain from his new acute problem which is right-sided shingles forehead    Patient was evaluated using the slit-lamp in the ER 5 days ago was not found to have any corneal involvement.  He returns to be checked by our office today after completing 7 days of valacyclovir and is having purulent drainage and right eye discomfort.  He states that the vision is blurry.  We did call [facility] and they will examine him right now at their office.  No further valacyclovir was given..    Health Maintenance Due   Topic Date Due   • COVID-19 Vaccine (1) Never done   • ANNUAL PHYSICAL  01/02/2020   • PROSTATE CANCER SCREENING  11/01/2020       History of Present Illness     Vitals:    07/02/21 1404 07/02/21 1406 07/02/21 1407   BP: (!) 161/104 (!) 158/108 152/85   BP Location: Right arm Left arm Left arm   Patient Position: Sitting Sitting Standing   Cuff Size: Adult Adult Adult   Pulse: 85     Temp: 97.7 °F (36.5 °C)     TempSrc: Temporal     SpO2: 97%     Weight: 82.3 kg (181 lb 6.4 oz)     Height: 170.2 cm (67.01\")       Body mass index is 28.4 kg/m².    Current Outpatient Medications on File Prior to Visit   Medication Sig Dispense Refill   • allopurinol (ZYLOPRIM) 100 MG tablet Take 100 mg by mouth Daily.  6   • amLODIPine (NORVASC) 10 MG tablet Take 10 mg by mouth Daily.  5   • HYDROcodone-acetaminophen (NORCO) 5-325 MG per tablet Take 1 tablet by mouth Every 4 (Four) Hours As Needed for Mild Pain . 30 tablet 0   • lisinopril (PRINIVIL,ZESTRIL) 40 MG tablet Take 40 mg by mouth Daily.  5   • " [DISCONTINUED] lidocaine (XYLOCAINE) 5 % ointment Apply  topically to the appropriate area as directed Every 2 (Two) Hours As Needed for Mild Pain . 50 g 2   • [DISCONTINUED] valACYclovir (VALTREX) 1000 MG tablet Take 1 tablet by mouth 3 (Three) Times a Day. 21 tablet 0     No current facility-administered medications on file prior to visit.       The following portions of the patient's history were reviewed and updated as appropriate: allergies, current medications, past family history, past medical history, past social history, past surgical history and problem list.    Review of Systems   Eyes: Positive for blurred vision, photophobia, discharge, redness and visual disturbance.   Skin: Positive for rash.       Objective   Physical Exam  Vitals reviewed.   Constitutional:       General: He is not in acute distress.     Appearance: Normal appearance. He is well-developed. He is not ill-appearing or toxic-appearing.   HENT:      Head: Normocephalic and atraumatic.      Right Ear: Tympanic membrane, ear canal and external ear normal.      Left Ear: Tympanic membrane, ear canal and external ear normal.      Nose: Nose normal.   Eyes:      General:         Right eye: Discharge present.      Extraocular Movements: Extraocular movements intact.      Pupils: Pupils are equal, round, and reactive to light.   Cardiovascular:      Rate and Rhythm: Normal rate and regular rhythm.      Heart sounds: Normal heart sounds.   Pulmonary:      Effort: Pulmonary effort is normal.      Breath sounds: Normal breath sounds.   Abdominal:      General: Bowel sounds are normal. There is no distension.      Palpations: Abdomen is soft. There is no mass.      Tenderness: There is no abdominal tenderness.   Musculoskeletal:         General: Normal range of motion.      Cervical back: Neck supple.   Skin:     Findings: Rash present.      Comments: Drying vesicular rash on the right side of the face to include the forehead.   Neurological:       General: No focal deficit present.      Mental Status: He is alert and oriented to person, place, and time.   Psychiatric:         Mood and Affect: Mood normal.         Behavior: Behavior normal.       PHQ-9 Total Score: 0    Assessment/Plan   Diagnoses and all orders for this visit:    1. Encounter for annual general medical examination with abnormal findings in adult (Primary)  Comments:  Patient was advised to wear sunscreen and seatbelt.    2. Rash of face  Comments:  Patient was treated versus 7 days with been valacyclovir for shingles of the V1 dermatome. He was seen at the emergency room and slit-lamp showed no corneal inv  Orders:  -     CBC Auto Differential    3. Screening for prostate cancer  -     PSA Screen    4. Elevated blood pressure reading without diagnosis of hypertension  Comments:  Blood pressure elevated today due to discomfort from the eye will reevaluate when labs are drawn.  Orders:  -     Comprehensive Metabolic Panel    5. Vitamin D deficiency  -     Vitamin D 25 Hydroxy    6. Change in bowel habits  Comments:  Bowels have returned to normal.    7. Screening cholesterol level  -     Lipid Panel    8. Arthritis  Comments:  No joint pain or swelling mention.  Orders:  -     Uric Acid        Patient Instructions   Go to [facility] to be seen now.    Recheck when able.

## 2021-07-16 ENCOUNTER — TELEPHONE (OUTPATIENT)
Dept: FAMILY MEDICINE CLINIC | Facility: CLINIC | Age: 56
End: 2021-07-16

## 2021-07-16 RX ORDER — GABAPENTIN 300 MG/1
CAPSULE ORAL
Qty: 60 CAPSULE | Refills: 0 | Status: SHIPPED | OUTPATIENT
Start: 2021-07-16

## 2021-07-16 RX ORDER — GABAPENTIN 300 MG/1
CAPSULE ORAL
Qty: 60 CAPSULE | Refills: 0 | Status: SHIPPED | OUTPATIENT
Start: 2021-07-16 | End: 2021-07-16 | Stop reason: SDUPTHER

## 2021-07-16 NOTE — TELEPHONE ENCOUNTER
PATIENT STOPED BY. HE SAID HE JUST LEFT [facility] IN [place]. THE EYE DOC SAID TO ASK YOU IF YOU CAN PRESCRIBE NEUROTIN FOR HIS POST HERPETIC NEURALGIA.  I’VE ALREADY CALLED FOR NOTES, SHE WILL FAX AS SOON AS IT IS SIGNED    PATIENT STOPPED BACK BY. [facility] IS OUT OF THE MEDICATION AND HE IS ASKING THAT IT BE SENT TO [facility] [place].

## 2021-07-27 ENCOUNTER — TELEPHONE (OUTPATIENT)
Dept: FAMILY MEDICINE CLINIC | Facility: CLINIC | Age: 56
End: 2021-07-27

## 2021-07-27 RX ORDER — TRIFLURIDINE 10 MG/ML
SOLUTION OPHTHALMIC
COMMUNITY
Start: 2021-07-19

## 2021-07-27 RX ORDER — TOBRAMYCIN AND DEXAMETHASONE 3; 1 MG/ML; MG/ML
SUSPENSION/ DROPS OPHTHALMIC
COMMUNITY
Start: 2021-07-02

## 2021-07-27 RX ORDER — VALACYCLOVIR HYDROCHLORIDE 1 G/1
1000 TABLET, FILM COATED ORAL 3 TIMES DAILY
Qty: 21 TABLET | Refills: 0 | Status: SHIPPED | OUTPATIENT
Start: 2021-07-27

## 2021-07-27 NOTE — TELEPHONE ENCOUNTER
Patient stopped in to get message. Was  Upset that he didn't receive a call stating it had been called out

## 2021-07-27 NOTE — TELEPHONE ENCOUNTER
DR CASTILLO FEELS LIKE EYE IS NOT GETTING ANY BETTER. SHE FEELS LIKE HE NEEDS ANOTHER ROUND OF ANTI VIRAL.     I DO NOT SEE MEDICATION ON PATIENTS LIST TO RENEW.

## 2021-08-12 RX ORDER — GABAPENTIN 300 MG/1
CAPSULE ORAL
Qty: 60 CAPSULE | Refills: 0 | OUTPATIENT
Start: 2021-08-12

## 2023-09-06 ENCOUNTER — APPOINTMENT (OUTPATIENT)
Dept: GENERAL RADIOLOGY | Facility: HOSPITAL | Age: 58
End: 2023-09-06
Payer: COMMERCIAL

## 2023-09-06 ENCOUNTER — HOSPITAL ENCOUNTER (EMERGENCY)
Facility: HOSPITAL | Age: 58
Discharge: HOME | End: 2023-09-06
Attending: EMERGENCY MEDICINE
Payer: COMMERCIAL

## 2023-09-06 VITALS
OXYGEN SATURATION: 98 % | HEART RATE: 106 BPM | WEIGHT: 178.13 LBS | RESPIRATION RATE: 20 BRPM | SYSTOLIC BLOOD PRESSURE: 173 MMHG | DIASTOLIC BLOOD PRESSURE: 98 MMHG | TEMPERATURE: 98 F | BODY MASS INDEX: 27.96 KG/M2 | HEIGHT: 67 IN
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
## 2023-09-06 PROCEDURE — 99283 EMERGENCY DEPT VISIT LOW MDM: CPT

## 2023-09-06 PROCEDURE — 90471 IMMUNIZATION ADMIN: CPT

## 2023-09-06 PROCEDURE — 73090 X-RAY EXAM OF FOREARM: CPT

## 2023-09-06 PROCEDURE — 90715 TDAP VACCINE 7 YRS/> IM: CPT

## 2023-09-06 PROCEDURE — 25010000002 TETANUS-DIPHTH-ACELL PERTUSSIS 5-2.5-18.5 LF-MCG/0.5 SUSPENSION PREFILLED SYRINGE

## 2023-09-06 RX ORDER — AMOXICILLIN AND CLAVULANATE POTASSIUM 875; 125 MG/1; MG/1
1 TABLET, FILM COATED ORAL ONCE
Status: COMPLETED | OUTPATIENT
Start: 2023-09-06 | End: 2023-09-06

## 2023-09-06 RX ORDER — AMOXICILLIN AND CLAVULANATE POTASSIUM 875; 125 MG/1; MG/1
1 TABLET, FILM COATED ORAL EVERY 12 HOURS
Qty: 14 TABLET | Refills: 0 | Status: SHIPPED | OUTPATIENT
Start: 2023-09-06

## 2023-09-06 RX ADMIN — AMOXICILLIN AND CLAVULANATE POTASSIUM 1 TABLET: 875; 125 TABLET, FILM COATED ORAL at 13:38

## 2023-09-06 RX ADMIN — TETANUS TOXOID, REDUCED DIPHTHERIA TOXOID AND ACELLULAR PERTUSSIS VACCINE, ADSORBED 0.5 ML: 5; 2.5; 8; 8; 2.5 SUSPENSION INTRAMUSCULAR at 13:38

## 2023-09-06 NOTE — DISCHARGE INSTRUCTIONS
Keep the area clean and dry, utilize antibacterial soap and water 2-3 times a day as needed.    Antibiotics as prescribed.    Follow-up with primary care    Return to the ED for new or worsening symptoms

## 2023-09-06 NOTE — ED PROVIDER NOTES
Subjective   History of Present Illness  Chief Complaint: Dog bite      HPI: Patient is a 57-year-old male presents the emergency room by private vehicle, states that he sustained a dog bite to the right forearm yesterday at approximately 5:30 PM.  Spoke to the owner, dog is up-to-date on its vaccinations.  He is unsure when his last tetanus shot.    PCP: [facility]    History provided by:  Patient    Review of Systems   Skin:  Positive for wound.     Past Medical History:   Diagnosis Date    Ankle fracture     right ankle    Arthritis     Gout    Back injury 07/01/2007    broke    Hypertension        No Known Allergies    Past Surgical History:   Procedure Laterality Date    ARM WOUND REPAIR / CLOSURE Left 2014    infection in bone    COLONOSCOPY N/A 9/26/2019    Procedure: COLONOSCOPY WITH POLYPECTOMY X 2,;  Surgeon: Sukhdev Silva MD;  Location: [facility];  Service: Gastroenterology    MASS EXCISION N/A 9/26/2019    Procedure: Excision of perianal skin tags;  Surgeon: Robert Stinson MD;  Location: [facility];  Service: General    SKIN TAG REMOVAL         Family History   Problem Relation Age of Onset    No Known Problems Mother     No Known Problems Father     No Known Problems Sister     No Known Problems Brother        Social History     Socioeconomic History    Marital status: Single   Tobacco Use    Smoking status: Never    Smokeless tobacco: Never   Vaping Use    Vaping Use: Never used   Substance and Sexual Activity    Alcohol use: No    Drug use: No    Sexual activity: Defer           Objective   Physical Exam  Vitals reviewed.   Constitutional:       General: He is not in acute distress.  Eyes:      Extraocular Movements: Extraocular movements intact.      Pupils: Pupils are equal, round, and reactive to light.   Cardiovascular:      Rate and Rhythm: Normal rate.      Pulses: Normal pulses.   Pulmonary:      Effort: Pulmonary effort is normal.   Musculoskeletal:          "General: Normal range of motion.      Cervical back: Normal range of motion.   Skin:     Capillary Refill: Capillary refill takes less than 2 seconds.             Comments: 2 wounds consistent with puncture.  Bleeding controlled with direct pressure  Multiple areas of abrasion as well as surrounding erythema, blanchable.  No neurovascular intact   Neurological:      General: No focal deficit present.      Mental Status: He is alert and oriented to person, place, and time. Mental status is at baseline.       Procedures           ED Course      /98 (BP Location: Left arm, Patient Position: Sitting)   Pulse 106   Temp 98 °F (36.7 °C) (Oral)   Resp 20   Ht 170.2 cm (67\")   Wt 80.8 kg (178 lb 2.1 oz)   SpO2 98%   BMI 27.90 kg/m²   Labs Reviewed - No data to display  Medications   Tetanus-Diphth-Acell Pertussis (BOOSTRIX) injection 0.5 mL (0.5 mL Intramuscular Given 9/6/23 1338)   amoxicillin-clavulanate (AUGMENTIN) 875-125 MG per tablet 1 tablet (1 tablet Oral Given 9/6/23 1338)     XR Forearm 2 View Right    Result Date: 9/6/2023  Impression: Negative. Electronically Signed: Luana Dumont MD  9/6/2023 1:56 PM EDT  Workstation ID: [id number]                                        Medical Decision Making  Patient presented to the emergency room with complaints of dog bite to his right forearm.  Area was cleaned with a dressing applied, repair was not completed due injury occurring approaching 20 hours ago.  Surrounding erythema that is blanchable with soft tissue swelling x-rays were obtained per my interpretation negative for fracture see ED course for official read.   He was given his tetanus shot with the first dose of Augmentin in the emergency room.  Prescription sent to his preferred pharmacy.  We discussed wound care advised to follow-up with PCP.  Patient gave verbal understanding of ED findings and plan for outpatient follow-up.  He was alert oriented nontoxic in appearance of discharge, denied " further questions or complaints.    Chart review: 7/2/2021 outpatient visit with PCP related to health maintenance      Note Disclaimer: At [facility], we believe that sharing information builds trust and better  relationships. You are receiving this note because you recently visited [facility]. It is possible you will see health information before a provider has talked with you about it. This kind of information can be easy to misunderstand. To help you fully understand what it means for your health, we urge you to discuss this note with your provider.       Part of this note may be an electronic transcription/translation of spoken language to printed text using the Dragon Dictation System.    Appropriate PPE worn during exam.    Problems Addressed:  Dog bite, initial encounter: complicated acute illness or injury    Amount and/or Complexity of Data Reviewed  Radiology: ordered and independent interpretation performed. Decision-making details documented in ED Course.    Risk  Prescription drug management.        Final diagnoses:   Dog bite, initial encounter       ED Disposition  ED Disposition       ED Disposition   Discharge    Condition   Stable    Comment   --               Nora Sterling MD  [address] [facility] [place] [address] [phone]               Medication List        New Prescriptions      amoxicillin-clavulanate 875-125 MG per tablet  Commonly known as: AUGMENTIN  Take 1 tablet by mouth Every 12 (Twelve) Hours.               Where to Get Your Medications        These medications were sent to [id number] - [place], [place] - [address] - [phone]  - [phone] FX  [address]  [place] [address]      Phone: [phone]   amoxicillin-clavulanate 875-125 MG per tablet            Claritza Locke, KUN  09/06/23 1529

## 2023-09-07 ENCOUNTER — TELEPHONE (OUTPATIENT)
Dept: FAMILY MEDICINE CLINIC | Facility: CLINIC | Age: 58
End: 2023-09-07
Payer: COMMERCIAL

## 2023-09-07 NOTE — TELEPHONE ENCOUNTER
Please call patient to make sure that we recheck his dog bite that caused him to go to [facility]

## 2023-09-08 NOTE — TELEPHONE ENCOUNTER
PT SEEING COMPANY  FOR SHORT TERM DISABILITY - SAID HE WILL CALL BACK AND [facility]PT AFTER HE GETS THINGS SETTLED W THAT

## 2024-02-01 ENCOUNTER — APPOINTMENT (OUTPATIENT)
Dept: GENERAL RADIOLOGY | Facility: HOSPITAL | Age: 59
DRG: 321 | End: 2024-02-01
Payer: COMMERCIAL

## 2024-02-01 ENCOUNTER — HOSPITAL ENCOUNTER (INPATIENT)
Facility: HOSPITAL | Age: 59
LOS: 4 days | Discharge: HOME | DRG: 321 | End: 2024-02-05
Attending: EMERGENCY MEDICINE | Admitting: EMERGENCY MEDICINE
Payer: COMMERCIAL

## 2024-02-01 DIAGNOSIS — R94.39 ABNORMAL NUCLEAR STRESS TEST: ICD-10-CM

## 2024-02-01 DIAGNOSIS — I50.9 ACUTE CONGESTIVE HEART FAILURE, UNSPECIFIED HEART FAILURE TYPE: Primary | ICD-10-CM

## 2024-02-01 DIAGNOSIS — Z95.5 S/P DRUG ELUTING CORONARY STENT PLACEMENT: ICD-10-CM

## 2024-02-01 DIAGNOSIS — I50.21 ACUTE HFREF (HEART FAILURE WITH REDUCED EJECTION FRACTION): ICD-10-CM

## 2024-02-01 DIAGNOSIS — I10 PRIMARY HYPERTENSION: ICD-10-CM

## 2024-02-01 DIAGNOSIS — I25.110 CORONARY ARTERY DISEASE INVOLVING NATIVE CORONARY ARTERY OF NATIVE HEART WITH UNSTABLE ANGINA PECTORIS: ICD-10-CM

## 2024-02-01 LAB
ALBUMIN SERPL-MCNC: 3.8 G/DL (ref 3.5–5.2)
ALBUMIN/GLOB SERPL: 1.7 G/DL
ALP SERPL-CCNC: 84 U/L (ref 39–117)
ALT SERPL W P-5'-P-CCNC: 28 U/L (ref 1–41)
ANION GAP SERPL CALCULATED.3IONS-SCNC: 11 MMOL/L (ref 5–15)
ANION GAP SERPL CALCULATED.3IONS-SCNC: 12 MMOL/L (ref 5–15)
AST SERPL-CCNC: 42 U/L (ref 1–40)
BASOPHILS # BLD AUTO: 0 10*3/MM3 (ref 0–0.2)
BASOPHILS NFR BLD AUTO: 0.2 % (ref 0–1.5)
BILIRUB SERPL-MCNC: 0.7 MG/DL (ref 0–1.2)
BUN SERPL-MCNC: 16 MG/DL (ref 6–20)
BUN SERPL-MCNC: 18 MG/DL (ref 6–20)
BUN/CREAT SERPL: 18.8 (ref 7–25)
BUN/CREAT SERPL: 20.2 (ref 7–25)
CALCIUM SPEC-SCNC: 9 MG/DL (ref 8.6–10.5)
CALCIUM SPEC-SCNC: 9.2 MG/DL (ref 8.6–10.5)
CHLORIDE SERPL-SCNC: 104 MMOL/L (ref 98–107)
CHLORIDE SERPL-SCNC: 108 MMOL/L (ref 98–107)
CO2 SERPL-SCNC: 22 MMOL/L (ref 22–29)
CO2 SERPL-SCNC: 24 MMOL/L (ref 22–29)
CREAT SERPL-MCNC: 0.85 MG/DL (ref 0.76–1.27)
CREAT SERPL-MCNC: 0.89 MG/DL (ref 0.76–1.27)
DEPRECATED RDW RBC AUTO: 41.6 FL (ref 37–54)
DEPRECATED RDW RBC AUTO: 44.6 FL (ref 37–54)
EGFRCR SERPLBLD CKD-EPI 2021: 100.1 ML/MIN/1.73
EGFRCR SERPLBLD CKD-EPI 2021: 98.7 ML/MIN/1.73
EOSINOPHIL # BLD AUTO: 0.1 10*3/MM3 (ref 0–0.4)
EOSINOPHIL NFR BLD AUTO: 0.5 % (ref 0.3–6.2)
ERYTHROCYTE [DISTWIDTH] IN BLOOD BY AUTOMATED COUNT: 13.2 % (ref 12.3–15.4)
ERYTHROCYTE [DISTWIDTH] IN BLOOD BY AUTOMATED COUNT: 13.7 % (ref 12.3–15.4)
GEN 5 2HR TROPONIN T REFLEX: 27 NG/L
GLOBULIN UR ELPH-MCNC: 2.3 GM/DL
GLUCOSE SERPL-MCNC: 107 MG/DL (ref 65–99)
GLUCOSE SERPL-MCNC: 91 MG/DL (ref 65–99)
HCT VFR BLD AUTO: 44.2 % (ref 37.5–51)
HCT VFR BLD AUTO: 45.3 % (ref 37.5–51)
HGB BLD-MCNC: 14.3 G/DL (ref 13–17.7)
HGB BLD-MCNC: 15.1 G/DL (ref 13–17.7)
LYMPHOCYTES # BLD AUTO: 1 10*3/MM3 (ref 0.7–3.1)
LYMPHOCYTES NFR BLD AUTO: 8.2 % (ref 19.6–45.3)
MCH RBC QN AUTO: 29.6 PG (ref 26.6–33)
MCH RBC QN AUTO: 31.3 PG (ref 26.6–33)
MCHC RBC AUTO-ENTMCNC: 32.4 G/DL (ref 31.5–35.7)
MCHC RBC AUTO-ENTMCNC: 33.5 G/DL (ref 31.5–35.7)
MCV RBC AUTO: 91.3 FL (ref 79–97)
MCV RBC AUTO: 93.5 FL (ref 79–97)
MONOCYTES # BLD AUTO: 1.2 10*3/MM3 (ref 0.1–0.9)
MONOCYTES NFR BLD AUTO: 10.3 % (ref 5–12)
NEUTROPHILS NFR BLD AUTO: 80.8 % (ref 42.7–76)
NEUTROPHILS NFR BLD AUTO: 9.5 10*3/MM3 (ref 1.7–7)
NRBC BLD AUTO-RTO: 0 /100 WBC (ref 0–0.2)
NT-PROBNP SERPL-MCNC: 2885 PG/ML (ref 0–900)
PLATELET # BLD AUTO: 173 10*3/MM3 (ref 140–450)
PLATELET # BLD AUTO: 176 10*3/MM3 (ref 140–450)
PMV BLD AUTO: 10.2 FL (ref 6–12)
PMV BLD AUTO: 9.9 FL (ref 6–12)
POTASSIUM SERPL-SCNC: 3.8 MMOL/L (ref 3.5–5.2)
POTASSIUM SERPL-SCNC: 4.7 MMOL/L (ref 3.5–5.2)
PROT SERPL-MCNC: 6.1 G/DL (ref 6–8.5)
RBC # BLD AUTO: 4.84 10*6/MM3 (ref 4.14–5.8)
RBC # BLD AUTO: 4.84 10*6/MM3 (ref 4.14–5.8)
SODIUM SERPL-SCNC: 139 MMOL/L (ref 136–145)
SODIUM SERPL-SCNC: 142 MMOL/L (ref 136–145)
TROPONIN T DELTA: 8 NG/L
TROPONIN T SERPL HS-MCNC: 19 NG/L
WBC NRBC COR # BLD AUTO: 11.8 10*3/MM3 (ref 3.4–10.8)
WBC NRBC COR # BLD AUTO: 9.8 10*3/MM3 (ref 3.4–10.8)

## 2024-02-01 PROCEDURE — 84484 ASSAY OF TROPONIN QUANT: CPT

## 2024-02-01 PROCEDURE — 85025 COMPLETE CBC W/AUTO DIFF WBC: CPT

## 2024-02-01 PROCEDURE — 25010000002 NITROGLYCERIN 200 MCG/ML SOLUTION: Performed by: EMERGENCY MEDICINE

## 2024-02-01 PROCEDURE — 85027 COMPLETE CBC AUTOMATED: CPT | Performed by: INTERNAL MEDICINE

## 2024-02-01 PROCEDURE — 25010000002 FUROSEMIDE PER 20 MG: Performed by: EMERGENCY MEDICINE

## 2024-02-01 PROCEDURE — 36415 COLL VENOUS BLD VENIPUNCTURE: CPT

## 2024-02-01 PROCEDURE — 83880 ASSAY OF NATRIURETIC PEPTIDE: CPT

## 2024-02-01 PROCEDURE — 80053 COMPREHEN METABOLIC PANEL: CPT

## 2024-02-01 PROCEDURE — 71045 X-RAY EXAM CHEST 1 VIEW: CPT

## 2024-02-01 PROCEDURE — 93005 ELECTROCARDIOGRAM TRACING: CPT

## 2024-02-01 PROCEDURE — 99285 EMERGENCY DEPT VISIT HI MDM: CPT

## 2024-02-01 PROCEDURE — 93005 ELECTROCARDIOGRAM TRACING: CPT | Performed by: EMERGENCY MEDICINE

## 2024-02-01 RX ORDER — POLYETHYLENE GLYCOL 3350 17 G/17G
17 POWDER, FOR SOLUTION ORAL DAILY PRN
Status: DISCONTINUED | OUTPATIENT
Start: 2024-02-01 | End: 2024-02-05 | Stop reason: HOSPADM

## 2024-02-01 RX ORDER — CARVEDILOL 3.12 MG/1
3.12 TABLET ORAL 2 TIMES DAILY WITH MEALS
Status: DISCONTINUED | OUTPATIENT
Start: 2024-02-02 | End: 2024-02-02

## 2024-02-01 RX ORDER — SODIUM CHLORIDE 0.9 % (FLUSH) 0.9 %
10 SYRINGE (ML) INJECTION AS NEEDED
Status: DISCONTINUED | OUTPATIENT
Start: 2024-02-01 | End: 2024-02-05 | Stop reason: HOSPADM

## 2024-02-01 RX ORDER — ACETAMINOPHEN 325 MG/1
650 TABLET ORAL EVERY 4 HOURS PRN
Status: DISCONTINUED | OUTPATIENT
Start: 2024-02-01 | End: 2024-02-05 | Stop reason: HOSPADM

## 2024-02-01 RX ORDER — FUROSEMIDE 10 MG/ML
40 INJECTION INTRAMUSCULAR; INTRAVENOUS ONCE
Status: COMPLETED | OUTPATIENT
Start: 2024-02-01 | End: 2024-02-01

## 2024-02-01 RX ORDER — BISACODYL 10 MG
10 SUPPOSITORY, RECTAL RECTAL DAILY PRN
Status: DISCONTINUED | OUTPATIENT
Start: 2024-02-01 | End: 2024-02-05 | Stop reason: HOSPADM

## 2024-02-01 RX ORDER — NITROGLYCERIN 0.4 MG/1
0.4 TABLET SUBLINGUAL
Status: DISCONTINUED | OUTPATIENT
Start: 2024-02-01 | End: 2024-02-04

## 2024-02-01 RX ORDER — CHOLECALCIFEROL (VITAMIN D3) 125 MCG
5 CAPSULE ORAL NIGHTLY PRN
Status: DISCONTINUED | OUTPATIENT
Start: 2024-02-01 | End: 2024-02-05 | Stop reason: HOSPADM

## 2024-02-01 RX ORDER — NITROGLYCERIN 20 MG/100ML
10-50 INJECTION INTRAVENOUS
Status: DISCONTINUED | OUTPATIENT
Start: 2024-02-01 | End: 2024-02-01

## 2024-02-01 RX ORDER — ACETAMINOPHEN 160 MG/5ML
650 SOLUTION ORAL EVERY 4 HOURS PRN
Status: DISCONTINUED | OUTPATIENT
Start: 2024-02-01 | End: 2024-02-05 | Stop reason: HOSPADM

## 2024-02-01 RX ORDER — ACETAMINOPHEN 650 MG/1
650 SUPPOSITORY RECTAL EVERY 4 HOURS PRN
Status: DISCONTINUED | OUTPATIENT
Start: 2024-02-01 | End: 2024-02-05 | Stop reason: HOSPADM

## 2024-02-01 RX ORDER — ONDANSETRON 2 MG/ML
4 INJECTION INTRAMUSCULAR; INTRAVENOUS EVERY 6 HOURS PRN
Status: DISCONTINUED | OUTPATIENT
Start: 2024-02-01 | End: 2024-02-04

## 2024-02-01 RX ORDER — FUROSEMIDE 10 MG/ML
40 INJECTION INTRAMUSCULAR; INTRAVENOUS EVERY 12 HOURS
Status: DISCONTINUED | OUTPATIENT
Start: 2024-02-02 | End: 2024-02-04

## 2024-02-01 RX ORDER — NITROGLYCERIN 20 MG/100ML
10-200 INJECTION INTRAVENOUS
Status: DISCONTINUED | OUTPATIENT
Start: 2024-02-01 | End: 2024-02-04

## 2024-02-01 RX ORDER — BISACODYL 5 MG/1
5 TABLET, DELAYED RELEASE ORAL DAILY PRN
Status: DISCONTINUED | OUTPATIENT
Start: 2024-02-01 | End: 2024-02-05 | Stop reason: HOSPADM

## 2024-02-01 RX ORDER — AMOXICILLIN 250 MG
2 CAPSULE ORAL 2 TIMES DAILY PRN
Status: DISCONTINUED | OUTPATIENT
Start: 2024-02-01 | End: 2024-02-05 | Stop reason: HOSPADM

## 2024-02-01 RX ADMIN — FUROSEMIDE 40 MG: 10 INJECTION, SOLUTION INTRAMUSCULAR; INTRAVENOUS at 19:30

## 2024-02-01 RX ADMIN — NITROGLYCERIN 5 MCG/MIN: 20 INJECTION INTRAVENOUS at 19:33

## 2024-02-01 NOTE — Clinical Note
Hemostasis started on the right radial artery. R-Band was used in achieving hemostasis. Radial compression device applied to vessel. Hemostasis achieved successfully. Closure device additional comment: 14 CC OF AIR

## 2024-02-01 NOTE — Clinical Note
Level of Care: Med/Surg [1]   Admitting Physician: DANAE ADAMS [[id number]]   Attending Physician: DANAE ADAMS [[id number]]   Bed Request Comments: u

## 2024-02-01 NOTE — ED PROVIDER NOTES
Subjective     Provider in Triage Note  Due to significant overcrowding in the emergency department, this patient was initially seen and evaluated in triage.  Provider in triage recommended patient be placed in treatment area to initiate therapy with movement to an ER bed as soon as possible.    Patient is a 58-year-old  male with history of hypertension who presents with complaints of bilateral leg swelling and shortness of breath that has been worsening for the past 3 weeks.  He saw his primary care provider with concerns of pneumonia but requested ER evaluation for CHF.  Patient has no cardiac history reported and takes lisinopril for high blood pressure.      History of Present Illness  Chief complaint: Patient is a 58-year-old who presents short of breath.  He saw his primary physician and they were concerned for CHF.  So they sent him here for further evaluation.  For 3 months he has been progressively more short of breath.  Worse with exertion and in the cold air.  He has been having some edema to the bilateral lower extremities.  He does not have chest pain.  I did review the provider in triage note.  He is not having any fever.  He thought maybe it was pneumonia.  However this has been a progressive issue over 3 months time.  He used to be on lisinopril for blood pressure control and is supposed to be but he is not taking it for a long time.    Context:    Duration:    Timing:    Severity: Severe    Associated Symptoms:        PCP:  LMP:      Review of Systems   Constitutional:  Positive for fatigue. Negative for fever.   Respiratory:  Positive for cough and shortness of breath.    Cardiovascular:  Positive for leg swelling. Negative for chest pain.   Gastrointestinal: Negative.    Genitourinary: Negative.    Musculoskeletal: Negative.    Allergic/Immunologic: Negative for immunocompromised state.   Neurological: Negative.    Psychiatric/Behavioral: Negative.         Past Medical History:    Diagnosis Date    Ankle fracture     right ankle    Arthritis     Gout    Back injury 07/01/2007    broke    Hypertension        No Known Allergies    Past Surgical History:   Procedure Laterality Date    ARM WOUND REPAIR / CLOSURE Left 2014    infection in bone    COLONOSCOPY N/A 9/26/2019    Procedure: COLONOSCOPY WITH POLYPECTOMY X 2,;  Surgeon: Sukhdev Silva MD;  Location: [facility] MAIN OR;  Service: Gastroenterology    MASS EXCISION N/A 9/26/2019    Procedure: Excision of perianal skin tags;  Surgeon: Robert Stinson MD;  Location: [facility] MAIN OR;  Service: General    SKIN TAG REMOVAL         Family History   Problem Relation Age of Onset    No Known Problems Mother     No Known Problems Father     No Known Problems Sister     No Known Problems Brother        Social History     Socioeconomic History    Marital status: Single   Tobacco Use    Smoking status: Never    Smokeless tobacco: Never   Vaping Use    Vaping Use: Never used   Substance and Sexual Activity    Alcohol use: No    Drug use: No    Sexual activity: Defer           Objective   Physical Exam  Vitals and nursing note reviewed.   Constitutional:       Appearance: He is well-developed.   HENT:      Head: Normocephalic and atraumatic.   Eyes:      Pupils: Pupils are equal, round, and reactive to light.   Cardiovascular:      Rate and Rhythm: Regular rhythm. Tachycardia present.      Heart sounds: Normal heart sounds.   Pulmonary:      Effort: Pulmonary effort is normal.   Abdominal:      Palpations: Abdomen is soft.      Tenderness: There is no abdominal tenderness.   Musculoskeletal:      Cervical back: Normal range of motion and neck supple.   Skin:     General: Skin is warm and dry.   Neurological:      General: No focal deficit present.      Mental Status: He is alert and oriented to person, place, and time.   Psychiatric:         Mood and Affect: Mood normal.         Behavior: Behavior normal.         Procedures           ED  Course                                        Results for orders placed or performed during the hospital encounter of 02/01/24   Comprehensive Metabolic Panel    Specimen: Blood   Result Value Ref Range    Glucose 91 65 - 99 mg/dL    BUN 18 6 - 20 mg/dL    Creatinine 0.89 0.76 - 1.27 mg/dL    Sodium 142 136 - 145 mmol/L    Potassium 4.7 3.5 - 5.2 mmol/L    Chloride 108 (H) 98 - 107 mmol/L    CO2 22.0 22.0 - 29.0 mmol/L    Calcium 9.2 8.6 - 10.5 mg/dL    Total Protein 6.1 6.0 - 8.5 g/dL    Albumin 3.8 3.5 - 5.2 g/dL    ALT (SGPT) 28 1 - 41 U/L    AST (SGOT) 42 (H) 1 - 40 U/L    Alkaline Phosphatase 84 39 - 117 U/L    Total Bilirubin 0.7 0.0 - 1.2 mg/dL    Globulin 2.3 gm/dL    A/G Ratio 1.7 g/dL    BUN/Creatinine Ratio 20.2 7.0 - 25.0    Anion Gap 12.0 5.0 - 15.0 mmol/L    eGFR 98.7 >60.0 mL/min/1.73   BNP    Specimen: Blood   Result Value Ref Range    proBNP 2,885.0 (H) 0.0 - 900.0 pg/mL   High Sensitivity Troponin T    Specimen: Blood   Result Value Ref Range    HS Troponin T 19 <22 ng/L   CBC Auto Differential    Specimen: Blood   Result Value Ref Range    WBC 11.80 (H) 3.40 - 10.80 10*3/mm3    RBC 4.84 4.14 - 5.80 10*6/mm3    Hemoglobin 15.1 13.0 - 17.7 g/dL    Hematocrit 45.3 37.5 - 51.0 %    MCV 93.5 79.0 - 97.0 fL    MCH 31.3 26.6 - 33.0 pg    MCHC 33.5 31.5 - 35.7 g/dL    RDW 13.7 12.3 - 15.4 %    RDW-SD 44.6 37.0 - 54.0 fl    MPV 9.9 6.0 - 12.0 fL    Platelets 173 140 - 450 10*3/mm3    Neutrophil % 80.8 (H) 42.7 - 76.0 %    Lymphocyte % 8.2 (L) 19.6 - 45.3 %    Monocyte % 10.3 5.0 - 12.0 %    Eosinophil % 0.5 0.3 - 6.2 %    Basophil % 0.2 0.0 - 1.5 %    Neutrophils, Absolute 9.50 (H) 1.70 - 7.00 10*3/mm3    Lymphocytes, Absolute 1.00 0.70 - 3.10 10*3/mm3    Monocytes, Absolute 1.20 (H) 0.10 - 0.90 10*3/mm3    Eosinophils, Absolute 0.10 0.00 - 0.40 10*3/mm3    Basophils, Absolute 0.00 0.00 - 0.20 10*3/mm3    nRBC 0.0 0.0 - 0.2 /100 WBC   ECG 12 Lead Chest Pain   Result Value Ref Range    QT Interval 342 ms     QTC Interval 465 ms        XR Chest 1 View    Result Date: 2/1/2024  1.Ill-defined multifocal airspace infiltrates bilaterally with diffuse interstitial changes. The findings suggest developing atypical/viral infection or multifocal pneumonia. Changes of pulmonary edema could also be considered. Recommend correlation for signs or symptoms of acute infection and follow-up to ensure improvement/resolution. Electronically Signed: Yuval Blankenship MD  2/1/2024 5:49 PM EST  Workstation ID: [id number]       Medical Decision Making  Patient was seen and evaluated for the above problem    Differential diagnosis includes but is not limited to CHF, pneumonia, COVID pneumonia    Patient has had progressive symptoms over 3 months.  He is hypertensive on arrival.  His EKG interpreted by myself sinus tachycardia rate of 111 there is abnormal R wave progression and left atrial enlargement.  He has nonspecific T wave changes.  His initial troponin is negative.  But his BNP is elevated and his chest x-ray shows consistent findings of CHF.  He is not febrile in the course of his progressive symptoms I believe likely secondary to acute new onset CHF.  He has been off of his lisinopril for months to years.  Potentially being persistently hypertensive has led to this.  Patient was provided Lasix as well as nitroglycerin.    Problems Addressed:  Acute congestive heart failure, unspecified heart failure type: complicated acute illness or injury    Amount and/or Complexity of Data Reviewed  Labs: ordered. Decision-making details documented in ED Course.     Details: Labs reviewed by myself  Radiology: ordered and independent interpretation performed.     Details: Chest x-ray reviewed by myself as above  ECG/medicine tests: ordered and independent interpretation performed.    Risk  Prescription drug management.  Decision regarding hospitalization.        Final diagnoses:   None   CHF new onset    ED Disposition  ED Disposition       None             No follow-up provider specified.       Medication List      No changes were made to your prescriptions during this visit.            Abner Holloway,   02/01/24 1957

## 2024-02-02 ENCOUNTER — APPOINTMENT (OUTPATIENT)
Dept: NUCLEAR MEDICINE | Facility: HOSPITAL | Age: 59
DRG: 321 | End: 2024-02-02
Payer: COMMERCIAL

## 2024-02-02 LAB
CHOLEST SERPL-MCNC: 101 MG/DL (ref 0–200)
DEPRECATED RDW RBC AUTO: 42.9 FL (ref 37–54)
ERYTHROCYTE [DISTWIDTH] IN BLOOD BY AUTOMATED COUNT: 13.4 % (ref 12.3–15.4)
HCT VFR BLD AUTO: 43.9 % (ref 37.5–51)
HDLC SERPL-MCNC: 45 MG/DL (ref 40–60)
HGB BLD-MCNC: 14.4 G/DL (ref 13–17.7)
LDLC SERPL CALC-MCNC: 38 MG/DL (ref 0–100)
LDLC/HDLC SERPL: 0.83 {RATIO}
MAGNESIUM SERPL-MCNC: 1.9 MG/DL (ref 1.6–2.6)
MCH RBC QN AUTO: 30.1 PG (ref 26.6–33)
MCHC RBC AUTO-ENTMCNC: 32.7 G/DL (ref 31.5–35.7)
MCV RBC AUTO: 91.9 FL (ref 79–97)
PLATELET # BLD AUTO: 154 10*3/MM3 (ref 140–450)
PMV BLD AUTO: 10.6 FL (ref 6–12)
QT INTERVAL: 342 MS
QTC INTERVAL: 465 MS
RBC # BLD AUTO: 4.77 10*6/MM3 (ref 4.14–5.8)
TRIGL SERPL-MCNC: 93 MG/DL (ref 0–150)
VLDLC SERPL-MCNC: 18 MG/DL (ref 5–40)
WBC NRBC COR # BLD AUTO: 9.4 10*3/MM3 (ref 3.4–10.8)

## 2024-02-02 PROCEDURE — 80061 LIPID PANEL: CPT | Performed by: NURSE PRACTITIONER

## 2024-02-02 PROCEDURE — 78452 HT MUSCLE IMAGE SPECT MULT: CPT

## 2024-02-02 PROCEDURE — 25010000002 FUROSEMIDE PER 20 MG

## 2024-02-02 PROCEDURE — 25010000002 FUROSEMIDE PER 20 MG: Performed by: INTERNAL MEDICINE

## 2024-02-02 PROCEDURE — G0378 HOSPITAL OBSERVATION PER HR: HCPCS

## 2024-02-02 PROCEDURE — 85027 COMPLETE CBC AUTOMATED: CPT | Performed by: INTERNAL MEDICINE

## 2024-02-02 PROCEDURE — 93017 CV STRESS TEST TRACING ONLY: CPT

## 2024-02-02 PROCEDURE — 78452 HT MUSCLE IMAGE SPECT MULT: CPT | Performed by: INTERNAL MEDICINE

## 2024-02-02 PROCEDURE — 83735 ASSAY OF MAGNESIUM: CPT | Performed by: NURSE PRACTITIONER

## 2024-02-02 PROCEDURE — 99222 1ST HOSP IP/OBS MODERATE 55: CPT | Performed by: INTERNAL MEDICINE

## 2024-02-02 PROCEDURE — 93018 CV STRESS TEST I&R ONLY: CPT | Performed by: INTERNAL MEDICINE

## 2024-02-02 RX ORDER — ALLOPURINOL 100 MG/1
100 TABLET ORAL DAILY
Status: DISCONTINUED | OUTPATIENT
Start: 2024-02-02 | End: 2024-02-05 | Stop reason: HOSPADM

## 2024-02-02 RX ORDER — CARVEDILOL 3.12 MG/1
6.25 TABLET ORAL 2 TIMES DAILY WITH MEALS
Qty: 60 TABLET | Refills: 3 | Status: SHIPPED | OUTPATIENT
Start: 2024-02-02 | End: 2024-02-02 | Stop reason: HOSPADM

## 2024-02-02 RX ORDER — FUROSEMIDE 40 MG/1
20 TABLET ORAL 2 TIMES DAILY
Qty: 60 TABLET | Refills: 0 | Status: SHIPPED | OUTPATIENT
Start: 2024-02-02 | End: 2024-02-02 | Stop reason: SDUPTHER

## 2024-02-02 RX ORDER — LOSARTAN POTASSIUM 25 MG/1
25 TABLET ORAL
Status: DISCONTINUED | OUTPATIENT
Start: 2024-02-03 | End: 2024-02-05 | Stop reason: HOSPADM

## 2024-02-02 RX ORDER — CARVEDILOL 6.25 MG/1
6.25 TABLET ORAL 2 TIMES DAILY WITH MEALS
Status: DISCONTINUED | OUTPATIENT
Start: 2024-02-02 | End: 2024-02-05 | Stop reason: HOSPADM

## 2024-02-02 RX ORDER — FUROSEMIDE 20 MG/1
20 TABLET ORAL 2 TIMES DAILY
Qty: 60 TABLET | Refills: 0 | Status: SHIPPED | OUTPATIENT
Start: 2024-02-02

## 2024-02-02 RX ORDER — LOSARTAN POTASSIUM 25 MG/1
25 TABLET ORAL DAILY
Qty: 30 TABLET | Refills: 0 | Status: SHIPPED | OUTPATIENT
Start: 2024-02-02

## 2024-02-02 RX ORDER — CARVEDILOL 6.25 MG/1
6.25 TABLET ORAL 2 TIMES DAILY WITH MEALS
Qty: 60 TABLET | Refills: 6 | Status: SHIPPED | OUTPATIENT
Start: 2024-02-02

## 2024-02-02 RX ORDER — LISINOPRIL 20 MG/1
40 TABLET ORAL DAILY
Status: DISCONTINUED | OUTPATIENT
Start: 2024-02-02 | End: 2024-02-02

## 2024-02-02 RX ADMIN — CARVEDILOL 6.25 MG: 6.25 TABLET, FILM COATED ORAL at 17:45

## 2024-02-02 RX ADMIN — CARVEDILOL 3.12 MG: 3.12 TABLET, FILM COATED ORAL at 07:52

## 2024-02-02 RX ADMIN — FUROSEMIDE 40 MG: 10 INJECTION, SOLUTION INTRAMUSCULAR; INTRAVENOUS at 04:38

## 2024-02-02 RX ADMIN — ALLOPURINOL 100 MG: 100 TABLET ORAL at 11:13

## 2024-02-02 RX ADMIN — EMPAGLIFLOZIN 10 MG: 10 TABLET, FILM COATED ORAL at 08:00

## 2024-02-02 RX ADMIN — LISINOPRIL 40 MG: 20 TABLET ORAL at 09:17

## 2024-02-02 RX ADMIN — FUROSEMIDE 40 MG: 10 INJECTION, SOLUTION INTRAMUSCULAR; INTRAVENOUS at 17:45

## 2024-02-02 NOTE — PROGRESS NOTES
[facility] MEDICINE SERVICE  DAILY PROGRESS NOTE    NAME: John Morales  : 1965  MRN: [id number]      LOS: 1 day     PROVIDER OF SERVICE: Alberto Miguel MD    Chief Complaint: CHF (congestive heart failure)    Subjective:     Interval History:  History taken from: patient  Patient Complaints:  shortness of breath     History of Present Illness: John Morales is a 58 y.o. male with a previous medical history of HTN who presented to [facility] on 2024 with  shortness of breath worse with exertion and cold weather and bilateral lower extremity swelling for the last three months.  He saw his PCP today who was concerned for heart failure and sent him to the ED for further evaluation.  The patient reports that he is supposed to take Amlodipine and Lisinopril but stopped months ago.  He denies chest pain or palpitations.      In the ED, BNP is 2885, Troponin 19, 27. Chest xray shows pulmonary edema versus multifocal pneumonia.  He is afebrile,  hypertensive at 146/96 and tachycardic at 113 on exam.   EKG reviewed, sinus tachycardia with PVCs and probably left atrial enlargement, .  He was started on a Tridil drip and given Lasix, 40mg IV in the ED.  Hospitalist was consulted for further care and management.     24 seen in bed NAD, no new complaints, anxious to go home, GERRY RN    Review of Systems  12 point ROS reviewed and negative except as mentioned above    Objective:     Vital Signs  Temp:  [98.1 °F (36.7 °C)-99.5 °F (37.5 °C)] 98.2 °F (36.8 °C)  Heart Rate:  [] 97  Resp:  [17-26] 25  BP: (113-153)/() 113/72   Body mass index is 28.73 kg/m².      Physical Exam  Vitals and nursing note reviewed.   Constitutional:       Appearance: Normal appearance.   HENT:      Head: Normocephalic and atraumatic.      Nose: Nose normal.      Mouth/Throat:      Mouth: Mucous membranes are moist.   Eyes:      Extraocular Movements: Extraocular movements intact.       Pupils: Pupils are equal, round, and reactive to light.   Cardiovascular:      Rate and Rhythm: Regular rhythm. Tachycardia present. Occasional Extrasystoles are present.     Pulses: Normal pulses.      Heart sounds: Normal heart sounds.   Pulmonary:      Effort: Pulmonary effort is normal.      Breath sounds: Examination of the right-lower field reveals decreased breath sounds. Examination of the left-lower field reveals decreased breath sounds. Decreased breath sounds present.   Abdominal:      General: Bowel sounds are normal.      Palpations: Abdomen is soft.   Musculoskeletal:         General: Normal range of motion.      Cervical back: Normal range of motion.      Right lower le+ Edema present.      Left lower le+ Edema present.   Skin:     General: Skin is warm and dry.   Neurological:      General: No focal deficit present.      Mental Status: He is alert and oriented to person, place, and time. Mental status is at baseline.   Psychiatric:         Mood and Affect: Mood normal.         Behavior: Behavior normal. Behavior is agitated. Behavior is cooperative.      Scheduled Meds   allopurinol, 100 mg, Oral, Daily  carvedilol, 3.125 mg, Oral, BID With Meals  empagliflozin, 10 mg, Oral, Daily  furosemide, 40 mg, Intravenous, Q12H  lisinopril, 40 mg, Oral, Daily       PRN Meds     acetaminophen **OR** acetaminophen **OR** acetaminophen    senna-docusate sodium **AND** polyethylene glycol **AND** bisacodyl **AND** bisacodyl    melatonin    nitroglycerin    ondansetron    [COMPLETED] Insert Peripheral IV **AND** sodium chloride   Infusions  nitroglycerin,  mcg/min, Last Rate: 15 mcg/min (24 0801)          Diagnostic Data    Results from last 7 days   Lab Units 24  0447 24  2232 24  1753   WBC 10*3/mm3 9.40 9.80 11.80*   HEMOGLOBIN g/dL 14.4 14.3 15.1   HEMATOCRIT % 43.9 44.2 45.3   PLATELETS 10*3/mm3 154 176 173   GLUCOSE mg/dL  --  107* 91   CREATININE mg/dL  --  0.85 0.89    BUN mg/dL  --  16 18   SODIUM mmol/L  --  139 142   POTASSIUM mmol/L  --  3.8 4.7   AST (SGOT) U/L  --   --  42*   ALT (SGPT) U/L  --   --  28   ALK PHOS U/L  --   --  84   BILIRUBIN mg/dL  --   --  0.7   ANION GAP mmol/L  --  11.0 12.0       XR Chest 1 View    Result Date: 2/1/2024  1.Ill-defined multifocal airspace infiltrates bilaterally with diffuse interstitial changes. The findings suggest developing atypical/viral infection or multifocal pneumonia. Changes of pulmonary edema could also be considered. Recommend correlation for signs or symptoms of acute infection and follow-up to ensure improvement/resolution. Electronically Signed: Yuval Blankenship MD  2/1/2024 5:49 PM EST  Workstation ID: [id number]       I reviewed the patient's new clinical results.    Assessment/Plan:     Active and Resolved Problems  Active Hospital Problems    Diagnosis  POA    **CHF (congestive heart failure) [I50.9]  Yes      Resolved Hospital Problems   No resolved problems to display.         Congestive Heart Failure-New Diagnosis  Essential Hypertension-Uncontrolled due to non compliance with medication  -BNP 2885  -Initial Troponin 19, 27  -EKG reviewed  -Chest xray reviewed, pulmonary edema noted  -Tridil drip started in ED for BP control, continue and wean as tolerated  -Restart home BP medications when verified  -Lasix 40mg IV given in ED, continue Q12H  -ECHO ordered  -Continuous cardiac monitoring  -Heart Failure Pathway initiated  Coreg 3.125mg BID  Jardiance 10mg daily  Daily weights  Strict I&O  Heart Failure Navigator Consult  -Cardiology consult       DVT prophylaxis:  Mechanical DVT prophylaxis orders are present.         Code status is   Code Status and Medical Interventions:   Ordered at: 02/01/24 2036     Code Status (Patient has no pulse and is not breathing):    CPR (Attempt to Resuscitate)     Medical Interventions (Patient has pulse or is breathing):    Full Support       Plan for disposition:home in 1  days    Time: 30 minutes    Signature: Electronically signed by Alberto Miguel MD, 02/02/24, 09:29 EST.  [facility] Hospitalist Team

## 2024-02-02 NOTE — CONSULTS
[facility]   CONSULT NOTE    Referring Provider: Alberto Miguel MD    Reason for Consultation: New onset heart failure      Patient Care Team:  Nora Sterling MD as PCP - General (Family Medicine)  Giorgio Kennedy MD as Consulting Physician (Gastroenterology)      Chief complaint: Shortness of breath is exertion    History of present illness:  John Morales is a 58 y.o. male with arthritis, back injury, hypertension who presented to the hospital with dyspnea on exertion and bilateral lower extremity edema.  He was evaluated by his PCP and suggested to come to the emergency room.  He reports having dyspnea on exertion particularly in cold weather associated with worsening lower extremity edema over the past several months.  Denies any chest pain or palpitations, dizziness or lightheadedness.  He was noted to have elevated proBNP of 2885, minimally elevated high-sensitivity troponin and cardiology was consulted.  Chest x-ray suggestive of atypical/viral infection or multifocal pneumonia.      Review of Systems   Constitutional: Negative for fever and malaise/fatigue.   Cardiovascular:  Positive for dyspnea on exertion and leg swelling. Negative for chest pain and palpitations.   Respiratory:  Positive for shortness of breath. Negative for cough.    Gastrointestinal:  Negative for nausea and vomiting.   Neurological:  Negative for dizziness and light-headedness.   All other systems reviewed and are negative.      History  Past Medical History:   Diagnosis Date    Ankle fracture     right ankle    Arthritis     Gout    Back injury 07/01/2007    broke    CHF (congestive heart failure) 2/1/2024    Hypertension        Past Surgical History:   Procedure Laterality Date    ARM WOUND REPAIR / CLOSURE Left 2014    infection in bone    COLONOSCOPY N/A 9/26/2019    Procedure: COLONOSCOPY WITH POLYPECTOMY X 2,;  Surgeon: Sukhdev Silva MD;  Location: [facility]  "MAIN OR;  Service: Gastroenterology    MASS EXCISION N/A 9/26/2019    Procedure: Excision of perianal skin tags;  Surgeon: Robert Stinson MD;  Location: [facility] MAIN OR;  Service: General    SKIN TAG REMOVAL         Family History   Problem Relation Age of Onset    No Known Problems Mother     No Known Problems Father     No Known Problems Sister     No Known Problems Brother        Social History     Tobacco Use    Smoking status: Never    Smokeless tobacco: Never   Vaping Use    Vaping Use: Never used   Substance Use Topics    Alcohol use: No    Drug use: No        Medications Prior to Admission   Medication Sig Dispense Refill Last Dose    allopurinol (ZYLOPRIM) 100 MG tablet Take 1 tablet by mouth Daily.  6 2/1/2024    lisinopril (PRINIVIL,ZESTRIL) 40 MG tablet Take 1 tablet by mouth Daily.  5 2/1/2024         Patient has no known allergies.    Scheduled Meds:  allopurinol, 100 mg, Oral, Daily  carvedilol, 6.25 mg, Oral, BID With Meals  empagliflozin, 10 mg, Oral, Daily  furosemide, 40 mg, Intravenous, Q12H  [START ON 2/3/2024] losartan, 25 mg, Oral, Q24H        Continuous Infusions:  nitroglycerin,  mcg/min, Last Rate: Stopped (02/02/24 0900)        PRN Meds:    acetaminophen **OR** acetaminophen **OR** acetaminophen    senna-docusate sodium **AND** polyethylene glycol **AND** bisacodyl **AND** bisacodyl    melatonin    nitroglycerin    ondansetron    [COMPLETED] Insert Peripheral IV **AND** sodium chloride      VITAL SIGNS  Vitals:    02/02/24 0752 02/02/24 0916 02/02/24 1254 02/02/24 1619   BP: 125/86 113/72 132/90 118/83   BP Location:  Left arm Left arm Left arm   Patient Position:  Lying Lying Lying   Pulse: 89 97 85 91   Resp:  25 22 16   Temp:  98.2 °F (36.8 °C) 98.8 °F (37.1 °C) 99 °F (37.2 °C)   TempSrc:  Oral Oral Oral   SpO2:       Weight:       Height:           Flowsheet Rows      Flowsheet Row First Filed Value   Admission Height 170.2 cm (67\") Documented at 02/01/2024 1722 "   Admission Weight 81.6 kg (180 lb) Documented at 02/01/2024 1722             TELEMETRY: Sinus tachycardia    Physical Exam:  The patient is alert, oriented and in no distress.  Vital signs as noted above.  Head and neck revealed no carotid bruits or jugular venous distention.  No thyromegaly or lymph adenopathy is present  Lungs clear.  No wheezing.  Breath sounds are normal bilaterally.  Heart normal first and second heart sounds.No murmur.  No precordial rub is present.  No gallop is present.  Abdomen soft and nontender.  No organomegaly is present.  Extremities with good peripheral pulses with pedal edema  Skin warm and dry.  Musculoskeletal system is grossly normal  CNS grossly normal           LAB RESULTS (LAST 7 DAYS)    CMP   Results from last 7 days   Lab Units 02/01/24 2232 02/01/24  1753   SODIUM mmol/L 139 142   POTASSIUM mmol/L 3.8 4.7   CHLORIDE mmol/L 104 108*   CO2 mmol/L 24.0 22.0   BUN mg/dL 16 18   CREATININE mg/dL 0.85 0.89   GLUCOSE mg/dL 107* 91   ALBUMIN g/dL  --  3.8   BILIRUBIN mg/dL  --  0.7   ALK PHOS U/L  --  84   AST (SGOT) U/L  --  42*   ALT (SGPT) U/L  --  28       BMP  Results from last 7 days   Lab Units 02/02/24  0918 02/01/24 2232 02/01/24  1753   SODIUM mmol/L  --  139 142   POTASSIUM mmol/L  --  3.8 4.7   CHLORIDE mmol/L  --  104 108*   CO2 mmol/L  --  24.0 22.0   BUN mg/dL  --  16 18   CREATININE mg/dL  --  0.85 0.89   GLUCOSE mg/dL  --  107* 91   MAGNESIUM mg/dL 1.9  --   --        CBC  Results from last 7 days   Lab Units 02/02/24  0447 02/01/24 2232 02/01/24  1753   WBC 10*3/mm3 9.40 9.80 11.80*   RBC 10*6/mm3 4.77 4.84 4.84   HEMOGLOBIN g/dL 14.4 14.3 15.1   HEMATOCRIT % 43.9 44.2 45.3   MCV fL 91.9 91.3 93.5   PLATELETS 10*3/mm3 154 176 173       ProBNP        TROPONIN  Results from last 7 days   Lab Units 02/01/24 2017   HSTROP T ng/L 27*       Creatinine Clearance  Estimated Creatinine Clearance: 96.5 mL/min (by C-G formula based on SCr of 0.85  mg/dL).      Radiology  XR Chest 1 View    Result Date: 2/1/2024  1.Ill-defined multifocal airspace infiltrates bilaterally with diffuse interstitial changes. The findings suggest developing atypical/viral infection or multifocal pneumonia. Changes of pulmonary edema could also be considered. Recommend correlation for signs or symptoms of acute infection and follow-up to ensure improvement/resolution. Electronically Signed: Yuval Blankenship MD  2/1/2024 5:49 PM EST  Workstation ID: [id number]     EKG    I personally viewed and interpreted the patient's EKG/Telemetry data:  ECG 12 Lead Chest Pain   Final Result   HEART RATE= 111  bpm   RR Interval= 540  ms   CA Interval= 165  ms   P Horizontal Axis= -4  deg   P Front Axis= 57  deg   QRSD Interval= 79  ms   QT Interval= 342  ms   QTcB= 465  ms   QRS Axis= 21  deg   T Wave Axis= 107  deg   - ABNORMAL ECG -   Sinus tachycardia   Paired ventricular premature complexes   Probable left atrial enlargement   Abnrm R prog, consider ASMI or lead placement   Nonspecific T abnormalities, lateral leads   When compared with ECG of 19-Sep-2019 15:25:50,   Significant rate increase   Significant repolarization change   Electronically Signed By: Hans Locke (Con) 02-Feb-2024 06:22:51   Date and Time of Study: 2024-02-01 17:08:30      SCANNED - TELEMETRY     Final Result      SCANNED - TELEMETRY     Final Result          ECHOCARDIOGRAM:      STRESS MYOVIEW:      CARDIAC CATHETERIZATION:  No results found for this or any previous visit.      OTHER:         Assessment & Plan       CHF (congestive heart failure)    Vitamin D deficiency    Hypertension    Arthritis    CHF  Unknown EF  Elevated proBNP, minimally elevated high-sensitivity troponin.  He has been started on diuretics  Started on Jardiance  Obtain echocardiogram  Obtain nuclear stress test for risk stratification    Hypertension  Continue losartan  Added beta-blocker  Uptitrate as tolerated    Hyperlipidemia  LDL 38, HDL 45,  triglyceride 93 and total cholesterol 101  Currently no indication for a statin    Overweight  BMI is 28.7.  He weighs 183 pounds.  Lifestyle modifications recommended to  the patient

## 2024-02-02 NOTE — PLAN OF CARE
Phase 3 - Prepare For Discharge - Current (Heart Failure Pathway)  Discharge Instructions  Outcome: Not Met  Variance Not applicable     Phase 3 - Prepare For Discharge - Current (Heart Failure Pathway)  Smoking Cessation Counseling  Outcome: Met  Durable Medical Equipment Arranged  Outcome: Met     Problem: Adjustment to Illness (Heart Failure)  Goal: Optimal Coping  Outcome: Ongoing, Progressing  Intervention: Support Psychosocial Response  Recent Flowsheet Documentation  Taken 2/2/2024 1200 by Karo Gibson RN  Supportive Measures: active listening utilized  Taken 2/2/2024 0801 by Karo Gibson RN  Supportive Measures: active listening utilized     Problem: Respiratory Compromise (Heart Failure)  Goal: Effective Oxygenation and Ventilation  Outcome: Ongoing, Progressing     Problem: COPD (Chronic Obstructive Pulmonary Disease) Comorbidity  Goal: Maintenance of COPD Symptom Control  Outcome: Ongoing, Progressing  Intervention: Maintain COPD-Symptom Control  Recent Flowsheet Documentation  Taken 2/2/2024 1200 by Karo Gibson RN  Supportive Measures: active listening utilized  Taken 2/2/2024 0801 by Karo Gibson RN  Supportive Measures: active listening utilized     Problem: Hypertension Comorbidity  Goal: Blood Pressure in Desired Range  Outcome: Ongoing, Progressing   Goal Outcome Evaluation:

## 2024-02-02 NOTE — ED NOTES
Nursing report ED to floor  John Morales  58 y.o.  male    HPI:   Chief Complaint   Patient presents with    Chest Pain     Chest pain having soa,  was at the company physician and told he might have pneumonia or CHF.  Edema to legs,  symptoms for 3 months.         Admitting doctor:   Abner Holloway DO    Admitting diagnosis:   The encounter diagnosis was Acute congestive heart failure, unspecified heart failure type.    Code status:   Current Code Status       Date Active Code Status Order ID Comments User Context       Not on file            Allergies:   Patient has no known allergies.    Isolation:  No active isolations     Fall Risk:  Fall Risk Assessment was completed, and patient is at low risk for falls.   Predictive Model Details         6 (Low) Factor Value    Calculated 2/1/2024 20:06 Age 58    Risk of Fall Model Musculoskeletal Assessment WDL     Active Peripheral IV Present     Imaging order in this encounter Present     Respiratory Rate 20     Skin Assessment WDL     Magnesium not on file     Drug Use No     Wicho Scale not on file     Number of Distinct Medication Classes administered 2     Financial Class Private Insurance     Peripheral Vascular Assessment WDL     Albumin 3.8 g/dL     Chloride 108 mmol/L     Clinically Relevant Sex Not Female     Total Bilirubin 0.7 mg/dL     Gastrointestinal Assessment WDL     Calcium 9.2 mg/dL     Cardiac Assessment WDL     Diastolic BP 92     ALT 28 U/L     Potassium 4.7 mmol/L     Days after Admission 0.112     Creatinine 0.89 mg/dL         Weight:       02/01/24  1722   Weight: 81.6 kg (180 lb)       Intake and Output  No intake or output data in the 24 hours ending 02/01/24 2006    Diet:        Most recent vitals:   Vitals:    02/01/24 1916 02/01/24 1931 02/01/24 1933 02/01/24 1946   BP: 136/89 139/97 139/97 141/92   BP Location:       Patient Position:       Pulse: 97 103 103 101   Resp:       Temp:       TempSrc:       SpO2: 92% 93% 93% 93%    Weight:       Height:           Active LDAs/IV Access:   Lines, Drains & Airways       Active LDAs       Name Placement date Placement time Site Days    Peripheral IV 02/01/24 1757 Anterior;Right Forearm 02/01/24 1757  Forearm  less than 1                    Skin Condition:   Skin Assessments (last day)       None             Labs (abnormal labs have a star):   Labs Reviewed   COMPREHENSIVE METABOLIC PANEL - Abnormal; Notable for the following components:       Result Value    Chloride 108 (*)     AST (SGOT) 42 (*)     All other components within normal limits    Narrative:     GFR Normal >60  Chronic Kidney Disease <60  Kidney Failure <15     BNP (IN-HOUSE) - Abnormal; Notable for the following components:    proBNP 2,885.0 (*)     All other components within normal limits    Narrative:     This assay is used as an aid in the diagnosis of individuals suspected of having heart failure. It can be used as an aid in the diagnosis of acute decompensated heart failure (ADHF) in patients presenting with signs and symptoms of ADHF to the emergency department (ED). In addition, NT-proBNP of <300 pg/mL indicates ADHF is not likely.    Age Range Result Interpretation  NT-proBNP Concentration (pg/mL:      <50             Positive            >450                   Gray                 300-450                    Negative             <300    50-75           Positive            >900                  Gray                300-900                  Negative            <300      >75             Positive            >1800                  Gray                300-1800                  Negative            <300   CBC WITH AUTO DIFFERENTIAL - Abnormal; Notable for the following components:    WBC 11.80 (*)     Neutrophil % 80.8 (*)     Lymphocyte % 8.2 (*)     Neutrophils, Absolute 9.50 (*)     Monocytes, Absolute 1.20 (*)     All other components within normal limits   TROPONIN - Normal    Narrative:     High Sensitive Troponin T  Reference Range:  <14.0 ng/L- Negative Female for AMI  <22.0 ng/L- Negative Male for AMI  >=14 - Abnormal Female indicating possible myocardial injury.  >=22 - Abnormal Male indicating possible myocardial injury.   Clinicians would have to utilize clinical acumen, EKG, Troponin, and serial changes to determine if it is an Acute Myocardial Infarction or myocardial injury due to an underlying chronic condition.        HIGH SENSITIVITIY TROPONIN T 2HR   CBC AND DIFFERENTIAL    Narrative:     The following orders were created for panel order CBC & Differential.  Procedure                               Abnormality         Status                     ---------                               -----------         ------                     CBC Auto Differential[[id number]]        Abnormal            Final result                 Please view results for these tests on the individual orders.       LOC: Person, Place, Time, and Situation    Telemetry:  Progressive Care    Cardiac Monitoring Ordered: yes    EKG:   ECG 12 Lead Chest Pain   Preliminary Result   HEART RATE= 111  bpm   RR Interval= 540  ms   GA Interval= 165  ms   P Horizontal Axis= -4  deg   P Front Axis= 57  deg   QRSD Interval= 79  ms   QT Interval= 342  ms   QTcB= 465  ms   QRS Axis= 21  deg   T Wave Axis= 107  deg   - ABNORMAL ECG -   Sinus tachycardia   Paired ventricular premature complexes   Probable left atrial enlargement   Abnrm R prog, consider ASMI or lead placement   Nonspecific T abnormalities, lateral leads   When compared with ECG of 19-Sep-2019 15:25:50,   Significant rate increase   Significant repolarization change   Electronically Signed By:    Date and Time of Study: 2024-02-01 17:08:30          Medications Given in the ED:   Medications   sodium chloride 0.9 % flush 10 mL (has no administration in time range)   nitroglycerin (TRIDIL) 200 mcg/ml infusion ( Intravenous Canceled Entry 2/1/24 1936)   furosemide (LASIX) injection 40 mg (40 mg  Intravenous Given 2/1/24 1930)       Imaging results:  XR Chest 1 View    Result Date: 2/1/2024  1.Ill-defined multifocal airspace infiltrates bilaterally with diffuse interstitial changes. The findings suggest developing atypical/viral infection or multifocal pneumonia. Changes of pulmonary edema could also be considered. Recommend correlation for signs or symptoms of acute infection and follow-up to ensure improvement/resolution. Electronically Signed: Yuval Blankenship MD  2/1/2024 5:49 PM EST  Workstation ID: [id number]     Social issues:   Social History     Socioeconomic History    Marital status: Single   Tobacco Use    Smoking status: Never    Smokeless tobacco: Never   Vaping Use    Vaping Use: Never used   Substance and Sexual Activity    Alcohol use: No    Drug use: No    Sexual activity: Defer       NIH Stroke Scale:  Interval: (not recorded)  1a. Level of Consciousness: (not recorded)  1b. LOC Questions: (not recorded)  1c. LOC Commands: (not recorded)  2. Best Gaze: (not recorded)  3. Visual: (not recorded)  4. Facial Palsy: (not recorded)  5a. Motor Arm, Left: (not recorded)  5b. Motor Arm, Right: (not recorded)  6a. Motor Leg, Left: (not recorded)  6b. Motor Leg, Right: (not recorded)  7. Limb Ataxia: (not recorded)  8. Sensory: (not recorded)  9. Best Language: (not recorded)  10. Dysarthria: (not recorded)  11. Extinction and Inattention (formerly Neglect): (not recorded)    Total (NIH Stroke Scale): (not recorded)     Additional notable assessment information:     Nursing report ED to floor:      Kathy Pérez RN   02/01/24 20:06 EST

## 2024-02-02 NOTE — CASE MANAGEMENT/SOCIAL WORK
Discharge Planning Assessment  [facility]     Patient Name: John Morales  MRN: [id number]  Today's Date: 2/2/2024    Admit Date: 2/1/2024    Plan: Anticipate routine home alone. [employer] copay $10/month per M2B.   Discharge Needs Assessment       Row Name 02/02/24 1124       Living Environment    People in Home alone    Current Living Arrangements home    Potentially Unsafe Housing Conditions none    In the past 12 months has the electric, gas, oil, or water company threatened to shut off services in your home? No    Primary Care Provided by self    Provides Primary Care For no one    Family Caregiver if Needed parent(s)    Quality of Family Relationships helpful    Able to Return to Prior Arrangements yes       Resource/Environmental Concerns    Resource/Environmental Concerns none    Transportation Concerns none       Transportation Needs    In the past 12 months, has lack of transportation kept you from medical appointments or from getting medications? no    In the past 12 months, has lack of transportation kept you from meetings, work, or from getting things needed for daily living? No       Food Insecurity    Within the past 12 months, you worried that your food would run out before you got the money to buy more. Never true    Within the past 12 months, the food you bought just didn't last and you didn't have money to get more. Never true       Transition Planning    Patient/Family Anticipates Transition to home    Patient/Family Anticipated Services at Transition none    Transportation Anticipated car, drives self       Discharge Needs Assessment    Readmission Within the Last 30 Days no previous admission in last 30 days    Equipment Currently Used at Home none    Concerns to be Addressed denies needs/concerns at this time    Anticipated Changes Related to Illness none    Equipment Needed After Discharge none                   Discharge Plan       Row Name 02/02/24 1124       Plan    Plan Anticipate  routine home alone. [employer] copay $10/month per M2B.    Patient/Family in Agreement with Plan yes    Plan Comments CM met with patient at bedside. Patient lives alone, is independent with ADLs and drives. PCP through employer, will see patient on-site at work. Pharmacy verified-denies any difficulty affording meds. CM confirmed with [facility] that Jardiance copay $10/month with copay care, patient aware and agreeable. Patient denies any d/c needs at this time and will drive self home. Barrier to D/C: anticipate d/c once cleared by cardiology.                      Expected Discharge Date and Time       Expected Discharge Date Expected Discharge Time    Feb 2, 2024            Demographic Summary       Row Name 02/02/24 1123       General Information    Admission Type inpatient    Arrived From emergency department    Referral Source admission list    Reason for Consult discharge planning    Preferred Language English       Contact Information    Permission Granted to Share Info With                    Functional Status       Row Name 02/02/24 1123       Functional Status    Usual Activity Tolerance good    Current Activity Tolerance good       Functional Status, IADL    Medications independent    Meal Preparation independent    Housekeeping independent    Laundry independent    Shopping independent       Mental Status    General Appearance WDL WDL       Mental Status Summary    Recent Changes in Mental Status/Cognitive Functioning no changes                  Met with patient in room.  Maintained distance greater than six feet and spent less than 15 minutes in the room.       CASEY LuoN, RN    [address] [address]    Office: [phone]  Fax: [phone]

## 2024-02-02 NOTE — PAYOR COMM NOTE
"Clinical for case# [id number]    Inpatient order 2/1/24  ER admit - md notes and clinical attached.   Myoview and Echo pending. IV Tridil drip at admit, Continued IV Lasix BID and cardiac monitoring.   ==========  AUTHORIZATION PENDING:   PLEASE FAX OR CALL DETERMINATION TO CONTACT BELOW:       THANK YOU,    CASEY McelroyN, RN  Utilization Review  [facility]  Phone: [phone]  Fax: [phone]      NPI: [id number]  Tax ID: [id number]        John Guerrero (58 y.o. Male)       Date of Birth   1965    Social Security Number       Address   [address] [place] [address]    Home Phone   [phone]    MRN   [id number]       Rastafari   Cheondoism    Marital Status   Single                            Admission Date   2/1/24    Admission Type   Emergency    Admitting Provider   Abner Holloway DO    Attending Provider   Alberto Miguel MD    Department, Room/Bed   [facility] PROGRESS CARE, 2122/1       Discharge Date       Discharge Disposition       Discharge Destination                                 Attending Provider: Alberto Miguel MD    Allergies: No Known Allergies    Isolation: None   Infection: None   Code Status: CPR    Ht: 170.2 cm (67\")   Wt: 83.2 kg (183 lb 6.8 oz)    Admission Cmt: None   Principal Problem: CHF (congestive heart failure) [I50.9]                   Active Insurance as of 2/1/2024       Primary Coverage       Payor Plan Insurance Group Employer/Plan Group    [facility] [id number]       Coverage Address Coverage Phone Number Coverage Fax Number Effective Dates    [address] [address] [phone]  1/1/2023 - None Entered    [facility] [id number]         Subscriber Name Subscriber Birth Date Member ID       JOHN GUERRERO 1965 [id number]                     Emergency Contacts        (Rel.) Home Phone Work Phone Mobile Phone    KUNAL GUERRERO (Father) -- -- [phone]      "            History & Physical        Nallely Us APRN at 24       Attestation signed by Annette Moore MD at 24 0622    I have reviewed this documentation and agree.                      [facility] Medicine Services  History & Physical    Patient Name: John Morales  : 1965  MRN: [id number]  Primary Care Physician:  Nora Sterling MD  Date of admission: 2024  Date and Time of Service: 2024 at 2015    Subjective      Chief Complaint: shortness of breath    History of Present Illness: John Morales is a 58 y.o. male with a previous medical history of HTN who presented to [facility] on 2024 with  shortness of breath worse with exertion and cold weather and bilateral lower extremity swelling for the last three months.  He saw his PCP today who was concerned for heart failure and sent him to the ED for further evaluation.  The patient reports that he is supposed to take Amlodipine and Lisinopril but stopped months ago.  He denies chest pain or palpitations.     In the ED, BNP is 2885, Troponin 19, 27. Chest xray shows pulmonary edema versus multifocal pneumonia.  He is afebrile,  hypertensive at 146/96 and tachycardic at 113 on exam.   EKG reviewed, sinus tachycardia with PVCs and probably left atrial enlargement, .  He was started on a Tridil drip and given Lasix, 40mg IV in the ED.  Hospitalist was consulted for further care and management.    12 point ROS reviewed and negative except as mentioned above      Personal History     Past Medical History:   Diagnosis Date    Ankle fracture     right ankle    Arthritis     Gout    Back injury 2007    broke    CHF (congestive heart failure) 2024    Hypertension        Past Surgical History:   Procedure Laterality Date    ARM WOUND REPAIR / CLOSURE Left     infection in bone    COLONOSCOPY N/A 2019    Procedure: COLONOSCOPY WITH POLYPECTOMY X 2,;  Surgeon: Shira  Sukhdev Alvares MD;  Location: [facility] MAIN OR;  Service: Gastroenterology    MASS EXCISION N/A 9/26/2019    Procedure: Excision of perianal skin tags;  Surgeon: Robert Stinson MD;  Location: [facility] MAIN OR;  Service: General    SKIN TAG REMOVAL         Family History: family history includes No Known Problems in his brother, father, mother, and sister. Otherwise pertinent FHx was reviewed and not pertinent to current issue.    Social History:  reports that he has never smoked. He has never used smokeless tobacco. He reports that he does not drink alcohol and does not use drugs.    Home Medications:  Prior to Admission Medications       Prescriptions Last Dose Informant Patient Reported? Taking?    allopurinol (ZYLOPRIM) 100 MG tablet   Yes No    Take 100 mg by mouth Daily.    amLODIPine (NORVASC) 10 MG tablet   Yes No    Take 10 mg by mouth Daily.    amoxicillin-clavulanate (AUGMENTIN) 875-125 MG per tablet   No No    Take 1 tablet by mouth Every 12 (Twelve) Hours.    gabapentin (NEURONTIN) 300 MG capsule   No No    One capsule  By mouth first day.  One capsule bid second day and then TID till almost gone and taper off    HYDROcodone-acetaminophen (NORCO) 5-325 MG per tablet   No No    Take 1 tablet by mouth Every 4 (Four) Hours As Needed for Mild Pain .    lisinopril (PRINIVIL,ZESTRIL) 40 MG tablet   Yes No    Take 40 mg by mouth Daily.    tobramycin-dexamethasone (TOBRADEX) 0.3-0.1 % ophthalmic suspension   Yes No    INSTILL 1 DROP INTO RIGHT EYE 4 TIMES DAILY    trifluridine (VIROPTIC) 1 % ophthalmic solution   Yes No    INSTILL 1 DROP IN RIGHT EYE FIVE TIMES DAILY    valACYclovir (Valtrex) 1000 MG tablet   No No    Take 1 tablet by mouth 3 (Three) Times a Day.          Allergies:  No Known Allergies    Objective      Vitals:   Temp:  [99.5 °F (37.5 °C)] 99.5 °F (37.5 °C)  Heart Rate:  [] 103  Resp:  [20-26] 26  BP: (136-153)/() 145/98  Body mass index is 28.19 kg/m².  Physical Exam  Vitals  and nursing note reviewed.   Constitutional:       Appearance: Normal appearance.   HENT:      Head: Normocephalic and atraumatic.      Nose: Nose normal.      Mouth/Throat:      Mouth: Mucous membranes are moist.   Eyes:      Extraocular Movements: Extraocular movements intact.      Pupils: Pupils are equal, round, and reactive to light.   Cardiovascular:      Rate and Rhythm: Regular rhythm. Tachycardia present. Occasional Extrasystoles are present.     Pulses: Normal pulses.      Heart sounds: Normal heart sounds.   Pulmonary:      Effort: Pulmonary effort is normal.      Breath sounds: Examination of the right-lower field reveals decreased breath sounds. Examination of the left-lower field reveals decreased breath sounds. Decreased breath sounds present.   Abdominal:      General: Bowel sounds are normal.      Palpations: Abdomen is soft.   Musculoskeletal:         General: Normal range of motion.      Cervical back: Normal range of motion.      Right lower le+ Edema present.      Left lower le+ Edema present.   Skin:     General: Skin is warm and dry.   Neurological:      General: No focal deficit present.      Mental Status: He is alert and oriented to person, place, and time. Mental status is at baseline.   Psychiatric:         Mood and Affect: Mood normal.         Behavior: Behavior normal. Behavior is agitated. Behavior is cooperative.           Assessment & Plan        This is a 58 y.o. male with:    Active and Resolved Problems  Active Hospital Problems    Diagnosis  POA    **CHF (congestive heart failure) [I50.9]  Yes      Resolved Hospital Problems   No resolved problems to display.       Plan:    Home medications not verified at the time of assessment and plan    Congestive Heart Failure-New Diagnosis  Essential Hypertension-Uncontrolled due to non compliance with medication  -BNP 2885  -Initial Troponin 19, 27  -EKG reviewed  -Chest xray reviewed, pulmonary edema noted  -Tridil drip started in  ED for BP control, continue and wean as tolerated  -Restart home BP medications when verified  -Lasix 40mg IV given in ED, continue Q12H  -ECHO ordered  -Continuous cardiac monitoring  -Heart Failure Pathway initiated  Coreg 3.125mg BID  Jardiance 10mg daily  Daily weights  Strict I&O  Heart Failure Navigator Consult  -Cardiology consult      DVT prophylaxis:  Mechanical DVT prophylaxis orders are present.        CODE STATUS:    Code Status (Patient has no pulse and is not breathing): CPR (Attempt to Resuscitate)  Medical Interventions (Patient has pulse or is breathing): Full Support        Admission Status:  I believe this patient meets inpatient status.    I discussed the patient's findings and my recommendations with patient and nursing staff.    Signature:     This document has been electronically signed by Nallely Us DNP, APRN on February 1, 2024 20:37 [facility] Hospitalist Team    Electronically signed by Annette Moore MD at 02/02/24 0622          Emergency Department Notes        Elisa, Kathy S, RN at 02/01/24 2006          Nursing report ED to floor  John Morales  58 y.o.  male    HPI:   Chief Complaint   Patient presents with    Chest Pain     Chest pain having soa,  was at the company physician and told he might have pneumonia or CHF.  Edema to legs,  symptoms for 3 months.         Admitting doctor:   Abner Holloway DO    Admitting diagnosis:   The encounter diagnosis was Acute congestive heart failure, unspecified heart failure type.    Code status:   Current Code Status       Date Active Code Status Order ID Comments User Context       Not on file            Allergies:   Patient has no known allergies.    Isolation:  No active isolations     Fall Risk:  Fall Risk Assessment was completed, and patient is at low risk for falls.   Predictive Model Details         6 (Low) Factor Value    Calculated 2/1/2024 20:06 Age 58    Risk of Fall Model Musculoskeletal Assessment  WDL     Active Peripheral IV Present     Imaging order in this encounter Present     Respiratory Rate 20     Skin Assessment WDL     Magnesium not on file     Drug Use No     Wicho Scale not on file     Number of Distinct Medication Classes administered 2     Financial Class Private Insurance     Peripheral Vascular Assessment WDL     Albumin 3.8 g/dL     Chloride 108 mmol/L     Clinically Relevant Sex Not Female     Total Bilirubin 0.7 mg/dL     Gastrointestinal Assessment WDL     Calcium 9.2 mg/dL     Cardiac Assessment WDL     Diastolic BP 92     ALT 28 U/L     Potassium 4.7 mmol/L     Days after Admission 0.112     Creatinine 0.89 mg/dL         Weight:       02/01/24 1722   Weight: 81.6 kg (180 lb)       Intake and Output  No intake or output data in the 24 hours ending 02/01/24 2006    Diet:        Most recent vitals:   Vitals:    02/01/24 1916 02/01/24 1931 02/01/24 1933 02/01/24 1946   BP: 136/89 139/97 139/97 141/92   BP Location:       Patient Position:       Pulse: 97 103 103 101   Resp:       Temp:       TempSrc:       SpO2: 92% 93% 93% 93%   Weight:       Height:           Active LDAs/IV Access:   Lines, Drains & Airways       Active LDAs       Name Placement date Placement time Site Days    Peripheral IV 02/01/24 1757 Anterior;Right Forearm 02/01/24  1757  Forearm  less than 1                    Skin Condition:   Skin Assessments (last day)       None             Labs (abnormal labs have a star):   Labs Reviewed   COMPREHENSIVE METABOLIC PANEL - Abnormal; Notable for the following components:       Result Value    Chloride 108 (*)     AST (SGOT) 42 (*)     All other components within normal limits    Narrative:     GFR Normal >60  Chronic Kidney Disease <60  Kidney Failure <15     BNP (IN-HOUSE) - Abnormal; Notable for the following components:    proBNP 2,885.0 (*)     All other components within normal limits    Narrative:     This assay is used as an aid in the diagnosis of individuals suspected of  having heart failure. It can be used as an aid in the diagnosis of acute decompensated heart failure (ADHF) in patients presenting with signs and symptoms of ADHF to the emergency department (ED). In addition, NT-proBNP of <300 pg/mL indicates ADHF is not likely.    Age Range Result Interpretation  NT-proBNP Concentration (pg/mL:      <50             Positive            >450                   Gray                 300-450                    Negative             <300    50-75           Positive            >900                  Gray                300-900                  Negative            <300      >75             Positive            >1800                  Gray                300-1800                  Negative            <300   CBC WITH AUTO DIFFERENTIAL - Abnormal; Notable for the following components:    WBC 11.80 (*)     Neutrophil % 80.8 (*)     Lymphocyte % 8.2 (*)     Neutrophils, Absolute 9.50 (*)     Monocytes, Absolute 1.20 (*)     All other components within normal limits   TROPONIN - Normal    Narrative:     High Sensitive Troponin T Reference Range:  <14.0 ng/L- Negative Female for AMI  <22.0 ng/L- Negative Male for AMI  >=14 - Abnormal Female indicating possible myocardial injury.  >=22 - Abnormal Male indicating possible myocardial injury.   Clinicians would have to utilize clinical acumen, EKG, Troponin, and serial changes to determine if it is an Acute Myocardial Infarction or myocardial injury due to an underlying chronic condition.        HIGH SENSITIVITIY TROPONIN T 2HR   CBC AND DIFFERENTIAL    Narrative:     The following orders were created for panel order CBC & Differential.  Procedure                               Abnormality         Status                     ---------                               -----------         ------                     CBC Auto Differential[[id number]]        Abnormal            Final result                 Please view results for these tests on the individual  orders.       LOC: Person, Place, Time, and Situation    Telemetry:  Progressive Care    Cardiac Monitoring Ordered: yes    EKG:   ECG 12 Lead Chest Pain   Preliminary Result   HEART RATE= 111  bpm   RR Interval= 540  ms   KS Interval= 165  ms   P Horizontal Axis= -4  deg   P Front Axis= 57  deg   QRSD Interval= 79  ms   QT Interval= 342  ms   QTcB= 465  ms   QRS Axis= 21  deg   T Wave Axis= 107  deg   - ABNORMAL ECG -   Sinus tachycardia   Paired ventricular premature complexes   Probable left atrial enlargement   Abnrm R prog, consider ASMI or lead placement   Nonspecific T abnormalities, lateral leads   When compared with ECG of 19-Sep-2019 15:25:50,   Significant rate increase   Significant repolarization change   Electronically Signed By:    Date and Time of Study: 2024-02-01 17:08:30          Medications Given in the ED:   Medications   sodium chloride 0.9 % flush 10 mL (has no administration in time range)   nitroglycerin (TRIDIL) 200 mcg/ml infusion ( Intravenous Canceled Entry 2/1/24 1936)   furosemide (LASIX) injection 40 mg (40 mg Intravenous Given 2/1/24 1930)       Imaging results:  XR Chest 1 View    Result Date: 2/1/2024  1.Ill-defined multifocal airspace infiltrates bilaterally with diffuse interstitial changes. The findings suggest developing atypical/viral infection or multifocal pneumonia. Changes of pulmonary edema could also be considered. Recommend correlation for signs or symptoms of acute infection and follow-up to ensure improvement/resolution. Electronically Signed: Yuval Blankenship MD  2/1/2024 5:49 PM EST  Workstation ID: [id number]     Social issues:   Social History     Socioeconomic History    Marital status: Single   Tobacco Use    Smoking status: Never    Smokeless tobacco: Never   Vaping Use    Vaping Use: Never used   Substance and Sexual Activity    Alcohol use: No    Drug use: No    Sexual activity: Defer       NIH Stroke Scale:  Interval: (not recorded)  1a. Level of Consciousness:  (not recorded)  1b. LOC Questions: (not recorded)  1c. LOC Commands: (not recorded)  2. Best Gaze: (not recorded)  3. Visual: (not recorded)  4. Facial Palsy: (not recorded)  5a. Motor Arm, Left: (not recorded)  5b. Motor Arm, Right: (not recorded)  6a. Motor Leg, Left: (not recorded)  6b. Motor Leg, Right: (not recorded)  7. Limb Ataxia: (not recorded)  8. Sensory: (not recorded)  9. Best Language: (not recorded)  10. Dysarthria: (not recorded)  11. Extinction and Inattention (formerly Neglect): (not recorded)    Total (NIH Stroke Scale): (not recorded)     Additional notable assessment information:     Nursing report ED to floor:      Kathy Pérez RN   02/01/24 20:06 EST     Electronically signed by Kathy Pérez RN at 02/01/24 2007       Abner Holloway DO at 02/01/24 1725          Subjective     Provider in Triage Note  Due to significant overcrowding in the emergency department, this patient was initially seen and evaluated in triage.  Provider in triage recommended patient be placed in treatment area to initiate therapy with movement to an ER bed as soon as possible.    Patient is a 58-year-old  male with history of hypertension who presents with complaints of bilateral leg swelling and shortness of breath that has been worsening for the past 3 weeks.  He saw his primary care provider with concerns of pneumonia but requested ER evaluation for CHF.  Patient has no cardiac history reported and takes lisinopril for high blood pressure.      History of Present Illness  Chief complaint: Patient is a 58-year-old who presents short of breath.  He saw his primary physician and they were concerned for CHF.  So they sent him here for further evaluation.  For 3 months he has been progressively more short of breath.  Worse with exertion and in the cold air.  He has been having some edema to the bilateral lower extremities.  He does not have chest pain.  I did review the provider in triage note.   He is not having any fever.  He thought maybe it was pneumonia.  However this has been a progressive issue over 3 months time.  He used to be on lisinopril for blood pressure control and is supposed to be but he is not taking it for a long time.    Context:    Duration:    Timing:    Severity: Severe    Associated Symptoms:        PCP:  LMP:      Review of Systems   Constitutional:  Positive for fatigue. Negative for fever.   Respiratory:  Positive for cough and shortness of breath.    Cardiovascular:  Positive for leg swelling. Negative for chest pain.   Gastrointestinal: Negative.    Genitourinary: Negative.    Musculoskeletal: Negative.    Allergic/Immunologic: Negative for immunocompromised state.   Neurological: Negative.    Psychiatric/Behavioral: Negative.         Past Medical History:   Diagnosis Date    Ankle fracture     right ankle    Arthritis     Gout    Back injury 07/01/2007    broke    Hypertension        No Known Allergies    Past Surgical History:   Procedure Laterality Date    ARM WOUND REPAIR / CLOSURE Left 2014    infection in bone    COLONOSCOPY N/A 9/26/2019    Procedure: COLONOSCOPY WITH POLYPECTOMY X 2,;  Surgeon: Sukhdev Silva MD;  Location: [facility] OR;  Service: Gastroenterology    MASS EXCISION N/A 9/26/2019    Procedure: Excision of perianal skin tags;  Surgeon: Robert Stinson MD;  [facility]: [facility] MAIN OR;  Service: General    SKIN TAG REMOVAL         Family History   Problem Relation Age of Onset    No Known Problems Mother     No Known Problems Father     No Known Problems Sister     No Known Problems Brother        Social History     Socioeconomic History    Marital status: Single   Tobacco Use    Smoking status: Never    Smokeless tobacco: Never   Vaping Use    Vaping Use: Never used   Substance and Sexual Activity    Alcohol use: No    Drug use: No    Sexual activity: Defer           Objective   Physical Exam  Vitals and nursing note reviewed.    Constitutional:       Appearance: He is well-developed.   HENT:      Head: Normocephalic and atraumatic.   Eyes:      Pupils: Pupils are equal, round, and reactive to light.   Cardiovascular:      Rate and Rhythm: Regular rhythm. Tachycardia present.      Heart sounds: Normal heart sounds.   Pulmonary:      Effort: Pulmonary effort is normal.   Abdominal:      Palpations: Abdomen is soft.      Tenderness: There is no abdominal tenderness.   Musculoskeletal:      Cervical back: Normal range of motion and neck supple.   Skin:     General: Skin is warm and dry.   Neurological:      General: No focal deficit present.      Mental Status: He is alert and oriented to person, place, and time.   Psychiatric:         Mood and Affect: Mood normal.         Behavior: Behavior normal.         Procedures          ED Course                                        Results for orders placed or performed during the hospital encounter of 02/01/24   Comprehensive Metabolic Panel    Specimen: Blood   Result Value Ref Range    Glucose 91 65 - 99 mg/dL    BUN 18 6 - 20 mg/dL    Creatinine 0.89 0.76 - 1.27 mg/dL    Sodium 142 136 - 145 mmol/L    Potassium 4.7 3.5 - 5.2 mmol/L    Chloride 108 (H) 98 - 107 mmol/L    CO2 22.0 22.0 - 29.0 mmol/L    Calcium 9.2 8.6 - 10.5 mg/dL    Total Protein 6.1 6.0 - 8.5 g/dL    Albumin 3.8 3.5 - 5.2 g/dL    ALT (SGPT) 28 1 - 41 U/L    AST (SGOT) 42 (H) 1 - 40 U/L    Alkaline Phosphatase 84 39 - 117 U/L    Total Bilirubin 0.7 0.0 - 1.2 mg/dL    Globulin 2.3 gm/dL    A/G Ratio 1.7 g/dL    BUN/Creatinine Ratio 20.2 7.0 - 25.0    Anion Gap 12.0 5.0 - 15.0 mmol/L    eGFR 98.7 >60.0 mL/min/1.73   BNP    Specimen: Blood   Result Value Ref Range    proBNP 2,885.0 (H) 0.0 - 900.0 pg/mL   High Sensitivity Troponin T    Specimen: Blood   Result Value Ref Range    HS Troponin T 19 <22 ng/L   CBC Auto Differential    Specimen: Blood   Result Value Ref Range    WBC 11.80 (H) 3.40 - 10.80 10*3/mm3    RBC 4.84 4.14 - 5.80  10*6/mm3    Hemoglobin 15.1 13.0 - 17.7 g/dL    Hematocrit 45.3 37.5 - 51.0 %    MCV 93.5 79.0 - 97.0 fL    MCH 31.3 26.6 - 33.0 pg    MCHC 33.5 31.5 - 35.7 g/dL    RDW 13.7 12.3 - 15.4 %    RDW-SD 44.6 37.0 - 54.0 fl    MPV 9.9 6.0 - 12.0 fL    Platelets 173 140 - 450 10*3/mm3    Neutrophil % 80.8 (H) 42.7 - 76.0 %    Lymphocyte % 8.2 (L) 19.6 - 45.3 %    Monocyte % 10.3 5.0 - 12.0 %    Eosinophil % 0.5 0.3 - 6.2 %    Basophil % 0.2 0.0 - 1.5 %    Neutrophils, Absolute 9.50 (H) 1.70 - 7.00 10*3/mm3    Lymphocytes, Absolute 1.00 0.70 - 3.10 10*3/mm3    Monocytes, Absolute 1.20 (H) 0.10 - 0.90 10*3/mm3    Eosinophils, Absolute 0.10 0.00 - 0.40 10*3/mm3    Basophils, Absolute 0.00 0.00 - 0.20 10*3/mm3    nRBC 0.0 0.0 - 0.2 /100 WBC   ECG 12 Lead Chest Pain   Result Value Ref Range    QT Interval 342 ms    QTC Interval 465 ms        XR Chest 1 View    Result Date: 2/1/2024  1.Ill-defined multifocal airspace infiltrates bilaterally with diffuse interstitial changes. The findings suggest developing atypical/viral infection or multifocal pneumonia. Changes of pulmonary edema could also be considered. Recommend correlation for signs or symptoms of acute infection and follow-up to ensure improvement/resolution. Electronically Signed: Yuval Blankenship MD  2/1/2024 5:49 PM EST  Workstation ID: [id number]       Medical Decision Making  Patient was seen and evaluated for the above problem    Differential diagnosis includes but is not limited to CHF, pneumonia, COVID pneumonia    Patient has had progressive symptoms over 3 months.  He is hypertensive on arrival.  His EKG interpreted by myself sinus tachycardia rate of 111 there is abnormal R wave progression and left atrial enlargement.  He has nonspecific T wave changes.  His initial troponin is negative.  But his BNP is elevated and his chest x-ray shows consistent findings of CHF.  He is not febrile in the course of his progressive symptoms I believe likely secondary to acute  new onset CHF.  He has been off of his lisinopril for months to years.  Potentially being persistently hypertensive has led to this.  Patient was provided Lasix as well as nitroglycerin.    Problems Addressed:  Acute congestive heart failure, unspecified heart failure type: complicated acute illness or injury    Amount and/or Complexity of Data Reviewed  Labs: ordered. Decision-making details documented in ED Course.     Details: Labs reviewed by myself  Radiology: ordered and independent interpretation performed.     Details: Chest x-ray reviewed by myself as above  ECG/medicine tests: ordered and independent interpretation performed.    Risk  Prescription drug management.  Decision regarding hospitalization.        Final diagnoses:   None   CHF new onset    ED Disposition  ED Disposition       None            No follow-up provider specified.       Medication List      No changes were made to your prescriptions during this visit.            Abner Holloway DO  02/01/24 1957      Electronically signed by Abner Holloway DO at 02/01/24 1957       Vital Signs (last day)       Date/Time Temp Temp src Pulse Resp BP Patient Position SpO2    02/02/24 1254 98.8 (37.1) Oral 85 22 132/90 Lying --    02/02/24 0916 98.2 (36.8) Oral 97 25 113/72 Lying --    02/02/24 0752 -- -- 89 -- 125/86 -- --    02/02/24 0430 98.1 (36.7) Oral 87 17 137/91 Lying 92    02/02/24 0030 98.2 (36.8) Oral 87 22 133/91 Lying 93    02/01/24 2043 -- -- 99 -- 129/105 -- --    02/01/24 2039 98.2 (36.8) Oral -- 19 -- Sitting --    02/01/24 2017 -- -- 103 -- 145/98 -- 93 02/01/24 2001 -- -- 100 26 142/96 Lying 93    02/01/24 1946 -- -- 101 -- 141/92 -- 93 02/01/24 1933 -- -- 103 -- 139/97 -- 93 02/01/24 1931 -- -- 103 -- 139/97 -- 93 02/01/24 1916 -- -- 97 -- 136/89 -- 92    02/01/24 1722 99.5 (37.5) Oral 111 20 153/101 Sitting 95          Facility-Administered Medications as of 2/2/2024   Medication Dose Route Frequency Provider  Last Rate Last Admin    acetaminophen (TYLENOL) tablet 650 mg  650 mg Oral Q4H PRN Alberto Miguel MD        Or    acetaminophen (TYLENOL) 160 MG/5ML oral solution 650 mg  650 mg Oral Q4H PRN Alberto Miguel MD        Or    acetaminophen (TYLENOL) suppository 650 mg  650 mg Rectal Q4H PRN Alberto Miguel MD        allopurinol (ZYLOPRIM) tablet 100 mg  100 mg Oral Daily Alberto Miguel MD   100 mg at 02/02/24 1113    sennosides-docusate (PERICOLACE) 8.6-50 MG per tablet 2 tablet  2 tablet Oral BID PRN Alberto Miguel MD        And    polyethylene glycol (MIRALAX) packet 17 g  17 g Oral Daily PRN Alberto Miguel MD        And    bisacodyl (DULCOLAX) EC tablet 5 mg  5 mg Oral Daily PRN Alberto Miguel MD        And    bisacodyl (DULCOLAX) suppository 10 mg  10 mg Rectal Daily PRN Alberto Miguel MD        carvedilol (COREG) tablet 6.25 mg  6.25 mg Oral BID With Meals Alberto Miguel MD        empagliflozin (JARDIANCE) tablet 10 mg  10 mg Oral Daily Alberto Miguel MD   10 mg at 02/02/24 0800    [COMPLETED] furosemide (LASIX) injection 40 mg  40 mg Intravenous Once Abner Holloway DO   40 mg at 02/01/24 1930    furosemide (LASIX) injection 40 mg  40 mg Intravenous Q12H Alberto Miguel MD   40 mg at 02/02/24 0438    [START ON 2/3/2024] losartan (COZAAR) tablet 25 mg  25 mg Oral Q24H Alberto Miguel MD        melatonin tablet 5 mg  5 mg Oral Nightly PRN Alberto Miguel MD        nitroglycerin (NITROSTAT) SL tablet 0.4 mg  0.4 mg Sublingual Q5 Min PRN Alberto Miguel MD        nitroglycerin (TRIDIL) 200 mcg/ml infusion   mcg/min Intravenous Titrated Alberto Miguel MD   Stopped at 02/02/24 0900    ondansetron (ZOFRAN) injection 4 mg  4 mg Intravenous Q6H PRN Alberto Miguel MD        sodium chloride 0.9 % flush 10 mL  10 mL Intravenous PRN Alberto Miguel MD         Lab Results (last 48 hours)       Procedure Component Value  Units Date/Time    Lipid Panel [[id number]] Collected: 02/02/24 0918    Specimen: Blood Updated: 02/02/24 1032     Total Cholesterol 101 mg/dL      Triglycerides 93 mg/dL      HDL Cholesterol 45 mg/dL      LDL Cholesterol  38 mg/dL      VLDL Cholesterol 18 mg/dL      LDL/HDL Ratio 0.83    Narrative:      Cholesterol Reference Ranges  (U.S. Department of Health and Human Services ATP III Classifications)    Desirable          <200 mg/dL  Borderline High    200-239 mg/dL  High Risk          >240 mg/dL      Triglyceride Reference Ranges  (U.S. Department of Health and Human Services ATP III Classifications)    Normal           <150 mg/dL  Borderline High  150-199 mg/dL  High             200-499 mg/dL  Very High        >500 mg/dL    HDL Reference Ranges  (U.S. Department of Health and Human Services ATP III Classifications)    Low     <40 mg/dl (major risk factor for CHD)  High    >60 mg/dl ('negative' risk factor for CHD)        LDL Reference Ranges  (U.S. Department of Health and Human Services ATP III Classifications)    Optimal          <100 mg/dL  Near Optimal     100-129 mg/dL  Borderline High  130-159 mg/dL  High             160-189 mg/dL  Very High        >189 mg/dL    Magnesium [[id number]]  (Normal) Collected: 02/02/24 0918    Specimen: Blood Updated: 02/02/24 1006     Magnesium 1.9 mg/dL     CBC (No Diff) [[id number]]  (Normal) Collected: 02/02/24 0447    Specimen: Blood Updated: 02/02/24 0550     WBC 9.40 10*3/mm3      RBC 4.77 10*6/mm3      Hemoglobin 14.4 g/dL      Hematocrit 43.9 %      MCV 91.9 fL      MCH 30.1 pg      MCHC 32.7 g/dL      RDW 13.4 %      RDW-SD 42.9 fl      MPV 10.6 fL      Platelets 154 10*3/mm3     Basic Metabolic Panel [[id number]]  (Abnormal) Collected: 02/01/24 2232    Specimen: Blood Updated: 02/01/24 2316     Glucose 107 mg/dL      BUN 16 mg/dL      Creatinine 0.85 mg/dL      Sodium 139 mmol/L      Potassium 3.8 mmol/L      Comment: Slight hemolysis detected by analyzer. Result may be  falsely elevated.        Chloride 104 mmol/L      CO2 24.0 mmol/L      Calcium 9.0 mg/dL      BUN/Creatinine Ratio 18.8     Anion Gap 11.0 mmol/L      eGFR 100.1 mL/min/1.73     Narrative:      GFR Normal >60  Chronic Kidney Disease <60  Kidney Failure <15      CBC (No Diff) [[id number]]  (Normal) Collected: 02/01/24 2232    Specimen: Blood Updated: 02/01/24 2241     WBC 9.80 10*3/mm3      RBC 4.84 10*6/mm3      Hemoglobin 14.3 g/dL      Hematocrit 44.2 %      MCV 91.3 fL      MCH 29.6 pg      MCHC 32.4 g/dL      RDW 13.2 %      RDW-SD 41.6 fl      MPV 10.2 fL      Platelets 176 10*3/mm3     High Sensitivity Troponin T 2Hr [[id number]]  (Abnormal) Collected: 02/01/24 2017    Specimen: Blood from Arm, Left Updated: 02/01/24 2053     HS Troponin T 27 ng/L      Troponin T Delta 8 ng/L     Narrative:      High Sensitive Troponin T Reference Range:  <14.0 ng/L- Negative Female for AMI  <22.0 ng/L- Negative Male for AMI  >=14 - Abnormal Female indicating possible myocardial injury.  >=22 - Abnormal Male indicating possible myocardial injury.   Clinicians would have to utilize clinical acumen, EKG, Troponin, and serial changes to determine if it is an Acute Myocardial Infarction or myocardial injury due to an underlying chronic condition.         Comprehensive Metabolic Panel [[id number]]  (Abnormal) Collected: 02/01/24 1753    Specimen: Blood Updated: 02/01/24 1836     Glucose 91 mg/dL      BUN 18 mg/dL      Creatinine 0.89 mg/dL      Sodium 142 mmol/L      Potassium 4.7 mmol/L      Comment: Slight hemolysis detected by analyzer. Result may be falsely elevated.        Chloride 108 mmol/L      CO2 22.0 mmol/L      Calcium 9.2 mg/dL      Total Protein 6.1 g/dL      Albumin 3.8 g/dL      ALT (SGPT) 28 U/L      AST (SGOT) 42 U/L      Comment: Slight hemolysis detected by analyzer. Result may be falsely elevated.        Alkaline Phosphatase 84 U/L      Total Bilirubin 0.7 mg/dL      Globulin 2.3 gm/dL      A/G Ratio 1.7 g/dL       BUN/Creatinine Ratio 20.2     Anion Gap 12.0 mmol/L      eGFR 98.7 mL/min/1.73     Narrative:      GFR Normal >60  Chronic Kidney Disease <60  Kidney Failure <15      BNP [[id number]]  (Abnormal) Collected: 02/01/24 1753    Specimen: Blood Updated: 02/01/24 1822     proBNP 2,885.0 pg/mL     Narrative:      This assay is used as an aid in the diagnosis of individuals suspected of having heart failure. It can be used as an aid in the diagnosis of acute decompensated heart failure (ADHF) in patients presenting with signs and symptoms of ADHF to the emergency department (ED). In addition, NT-proBNP of <300 pg/mL indicates ADHF is not likely.    Age Range Result Interpretation  NT-proBNP Concentration (pg/mL:      <50             Positive            >450                   Gray                 300-450                    Negative             <300    50-75           Positive            >900                  Gray                300-900                  Negative            <300      >75             Positive            >1800                  Gray                300-1800                  Negative            <300    High Sensitivity Troponin T [[id number]]  (Normal) Collected: 02/01/24 1753    Specimen: Blood Updated: 02/01/24 1822     HS Troponin T 19 ng/L     Narrative:      High Sensitive Troponin T Reference Range:  <14.0 ng/L- Negative Female for AMI  <22.0 ng/L- Negative Male for AMI  >=14 - Abnormal Female indicating possible myocardial injury.  >=22 - Abnormal Male indicating possible myocardial injury.   Clinicians would have to utilize clinical acumen, EKG, Troponin, and serial changes to determine if it is an Acute Myocardial Infarction or myocardial injury due to an underlying chronic condition.         CBC & Differential [[id number]]  (Abnormal) Collected: 02/01/24 1753    Specimen: Blood Updated: 02/01/24 1801    Narrative:      The following orders were created for panel order CBC & Differential.  Procedure                                Abnormality         Status                     ---------                               -----------         ------                     CBC Auto Differential[[id number]]        Abnormal            Final result                 Please view results for these tests on the individual orders.    CBC Auto Differential [[id number]]  (Abnormal) Collected: 02/01/24 1753    Specimen: Blood Updated: 02/01/24 1801     WBC 11.80 10*3/mm3      RBC 4.84 10*6/mm3      Hemoglobin 15.1 g/dL      Hematocrit 45.3 %      MCV 93.5 fL      MCH 31.3 pg      MCHC 33.5 g/dL      RDW 13.7 %      RDW-SD 44.6 fl      MPV 9.9 fL      Platelets 173 10*3/mm3      Neutrophil % 80.8 %      Lymphocyte % 8.2 %      Monocyte % 10.3 %      Eosinophil % 0.5 %      Basophil % 0.2 %      Neutrophils, Absolute 9.50 10*3/mm3      Lymphocytes, Absolute 1.00 10*3/mm3      Monocytes, Absolute 1.20 10*3/mm3      Eosinophils, Absolute 0.10 10*3/mm3      Basophils, Absolute 0.00 10*3/mm3      nRBC 0.0 /100 WBC           Imaging Results (Last 48 Hours)       Procedure Component Value Units Date/Time    XR Chest 1 View [[id number]] Collected: 02/01/24 1749     Updated: 02/01/24 1752    Narrative:      XR CHEST 1 VW    Date of Exam: 2/1/2024 5:43 PM EST    Indication: soa    Comparison: None available.    FINDINGS:  There are ill-defined multifocal airspace infiltrates throughout the lungs bilaterally with associated diffuse interstitial changes. No pleural effusions are observed. The cardiac silhouette and mediastinum are unremarkable. No acute osseous   abnormalities are identified.      Impression:      1.Ill-defined multifocal airspace infiltrates bilaterally with diffuse interstitial changes. The findings suggest developing atypical/viral infection or multifocal pneumonia. Changes of pulmonary edema could also be considered. Recommend correlation for   signs or symptoms of acute infection and follow-up to ensure  improvement/resolution.      Electronically Signed: Yuval Blankenship MD    2024 5:49 PM EST    Workstation ID: [id number]          ECG/EMG Results (last 48 hours)       Procedure Component Value Units Date/Time    ECG 12 Lead Chest Pain [[id number]] Collected: 24 1708     Updated: 24 06     QT Interval 342 ms      QTC Interval 465 ms     Narrative:      HEART RATE= 111  bpm  RR Interval= 540  ms  DE Interval= 165  ms  P Horizontal Axis= -4  deg  P Front Axis= 57  deg  QRSD Interval= 79  ms  QT Interval= 342  ms  QTcB= 465  ms  QRS Axis= 21  deg  T Wave Axis= 107  deg  - ABNORMAL ECG -  Sinus tachycardia  Paired ventricular premature complexes  Probable left atrial enlargement  Abnrm R prog, consider ASMI or lead placement  Nonspecific T abnormalities, lateral leads  When compared with ECG of 19-Sep-2019 15:25:50,  Significant rate increase  Significant repolarization change  Electronically Signed By: Hans Locke ([facility]) 2024 06:22:51  Date and Time of Study: 2024 17:08:30    SCANNED - TELEMETRY   [[id number]] Resulted: 24     Updated: 24 0826    SCANNED - TELEMETRY   [[id number]] Resulted: 24     Updated: 24 0932             Physician Progress Notes (last 48 hours)        Alberto Miguel MD at 24 0929              [facility] MEDICINE SERVICE  DAILY PROGRESS NOTE    NAME: John Morales  : 1965  MRN: [id number]      LOS: 1 day     PROVIDER OF SERVICE: Alberto Miguel MD    Chief Complaint: CHF (congestive heart failure)    Subjective:     Interval History:  History taken from: patient  Patient Complaints:  shortness of breath     History of Present Illness: John Morales is a 58 y.o. male with a previous medical history of HTN who presented to [facility] on 2024 with  shortness of breath worse with exertion and cold weather and bilateral lower extremity swelling for the last three months.  He saw his PCP today who was  concerned for heart failure and sent him to the ED for further evaluation.  The patient reports that he is supposed to take Amlodipine and Lisinopril but stopped months ago.  He denies chest pain or palpitations.      In the ED, BNP is 2885, Troponin 19, 27. Chest xray shows pulmonary edema versus multifocal pneumonia.  He is afebrile,  hypertensive at 146/96 and tachycardic at 113 on exam.   EKG reviewed, sinus tachycardia with PVCs and probably left atrial enlargement, .  He was started on a Tridil drip and given Lasix, 40mg IV in the ED.  Hospitalist was consulted for further care and management.     2/2/24 seen in bed NAD, no new complaints, anxious to go home, GERRY RN    Review of Systems  12 point ROS reviewed and negative except as mentioned above    Objective:     Vital Signs  Temp:  [98.1 °F (36.7 °C)-99.5 °F (37.5 °C)] 98.2 °F (36.8 °C)  Heart Rate:  [] 97  Resp:  [17-26] 25  BP: (113-153)/() 113/72   Body mass index is 28.73 kg/m².      Physical Exam  Vitals and nursing note reviewed.   Constitutional:       Appearance: Normal appearance.   HENT:      Head: Normocephalic and atraumatic.      Nose: Nose normal.      Mouth/Throat:      Mouth: Mucous membranes are moist.   Eyes:      Extraocular Movements: Extraocular movements intact.      Pupils: Pupils are equal, round, and reactive to light.   Cardiovascular:      Rate and Rhythm: Regular rhythm. Tachycardia present. Occasional Extrasystoles are present.     Pulses: Normal pulses.      Heart sounds: Normal heart sounds.   Pulmonary:      Effort: Pulmonary effort is normal.      Breath sounds: Examination of the right-lower field reveals decreased breath sounds. Examination of the left-lower field reveals decreased breath sounds. Decreased breath sounds present.   Abdominal:      General: Bowel sounds are normal.      Palpations: Abdomen is soft.   Musculoskeletal:         General: Normal range of motion.      Cervical back: Normal range  of motion.      Right lower le+ Edema present.      Left lower le+ Edema present.   Skin:     General: Skin is warm and dry.   Neurological:      General: No focal deficit present.      Mental Status: He is alert and oriented to person, place, and time. Mental status is at baseline.   Psychiatric:         Mood and Affect: Mood normal.         Behavior: Behavior normal. Behavior is agitated. Behavior is cooperative.      Scheduled Meds   allopurinol, 100 mg, Oral, Daily  carvedilol, 3.125 mg, Oral, BID With Meals  empagliflozin, 10 mg, Oral, Daily  furosemide, 40 mg, Intravenous, Q12H  lisinopril, 40 mg, Oral, Daily       PRN Meds     acetaminophen **OR** acetaminophen **OR** acetaminophen    senna-docusate sodium **AND** polyethylene glycol **AND** bisacodyl **AND** bisacodyl    melatonin    nitroglycerin    ondansetron    [COMPLETED] Insert Peripheral IV **AND** sodium chloride   Infusions  nitroglycerin,  mcg/min, Last Rate: 15 mcg/min (24 0801)          Diagnostic Data    Results from last 7 days   Lab Units 24  0447 24  2232 24  1753   WBC 10*3/mm3 9.40 9.80 11.80*   HEMOGLOBIN g/dL 14.4 14.3 15.1   HEMATOCRIT % 43.9 44.2 45.3   PLATELETS 10*3/mm3 154 176 173   GLUCOSE mg/dL  --  107* 91   CREATININE mg/dL  --  0.85 0.89   BUN mg/dL  --  16 18   SODIUM mmol/L  --  139 142   POTASSIUM mmol/L  --  3.8 4.7   AST (SGOT) U/L  --   --  42*   ALT (SGPT) U/L  --   --  28   ALK PHOS U/L  --   --  84   BILIRUBIN mg/dL  --   --  0.7   ANION GAP mmol/L  --  11.0 12.0       XR Chest 1 View    Result Date: 2024  1.Ill-defined multifocal airspace infiltrates bilaterally with diffuse interstitial changes. The findings suggest developing atypical/viral infection or multifocal pneumonia. Changes of pulmonary edema could also be considered. Recommend correlation for signs or symptoms of acute infection and follow-up to ensure improvement/resolution. Electronically Signed: Yuval Blankenship  MD  2/1/2024 5:49 PM EST  Workstation ID: [id number]       I reviewed the patient's new clinical results.    Assessment/Plan:     Active and Resolved Problems  Active Hospital Problems    Diagnosis  POA    **CHF (congestive heart failure) [I50.9]  Yes      Resolved Hospital Problems   No resolved problems to display.         Congestive Heart Failure-New Diagnosis  Essential Hypertension-Uncontrolled due to non compliance with medication  -BNP 2885  -Initial Troponin 19, 27  -EKG reviewed  -Chest xray reviewed, pulmonary edema noted  -Tridil drip started in ED for BP control, continue and wean as tolerated  -Restart home BP medications when verified  -Lasix 40mg IV given in ED, continue Q12H  -ECHO ordered  -Continuous cardiac monitoring  -Heart Failure Pathway initiated  Coreg 3.125mg BID  Jardiance 10mg daily  Daily weights  Strict I&O  Heart Failure Navigator Consult  -Cardiology consult       DVT prophylaxis:  Mechanical DVT prophylaxis orders are present.         Code status is   Code Status and Medical Interventions:   Ordered at: 02/01/24 2036     Code Status (Patient has no pulse and is not breathing):    CPR (Attempt to Resuscitate)     Medical Interventions (Patient has pulse or is breathing):    Full Support       Plan for disposition:home in 1 days    Time: 30 minutes    Signature: Electronically signed by Alberto Miguel MD, 02/02/24, 09:29 EST.  [facility] Hospitalist Team    Electronically signed by Alberto Miguel MD at 02/02/24 0931          Consult Notes (last 48 hours)        Linda Bond APRN at 02/02/24 1505          [facility]   CONSULT NOTE    Referring Provider: Alberto Miguel MD    Reason for Consultation: New onset CHF      Patient Care Team:  Nora Sterling MD as PCP - General (Family Medicine)  Giorgio Kennedy MD as Consulting Physician (Gastroenterology)      Chief complaint: Shortness of breath is exertion    History  of present illness:  John Morales is a 58 y.o. male with past medical history of arthritis, back injury, hypertension who presented to the ED with dyspnea on exertion and bilateral lower extremity edema.  Patient reports he was seen by the physician at his work and suggested he come to the ED for further evaluation  of his symptoms.  Patient reports having dyspnea with exertion particularly in the cold weather and lower extremity edema for the past several months.  He reports his only cardiac history is hypertension.  Patient denies chest pain, palpitations, dizziness, lightheadedness.  Patient is a non-smoker.  Cardiology was consulted for new onset congestive heart failure.  He was given IV Lasix and started on a Tridil drip in the ED.    Workup in the ED showed slightly elevated troponin of 19-27, proBNP 2885, lipid panel within normal limits, all other lab work unremarkable.  Chest x-ray shows findings suggestive of atypical/viral infection or multifocal pneumonia.  Changes of pulmonary edema should also be considered.  EKG shows sinus tachycardia with occasional PVCs, rate 111    Review of Systems   Constitutional: Negative for fever and malaise/fatigue.   Cardiovascular:  Positive for dyspnea on exertion and leg swelling. Negative for chest pain and palpitations.   Respiratory:  Positive for shortness of breath. Negative for cough.    Gastrointestinal:  Negative for nausea and vomiting.   Neurological:  Negative for dizziness and light-headedness.   All other systems reviewed and are negative.      History  Past Medical History:   Diagnosis Date    Ankle fracture     right ankle    Arthritis     Gout    Back injury 07/01/2007    broke    CHF (congestive heart failure) 2/1/2024    Hypertension        Past Surgical History:   Procedure Laterality Date    ARM WOUND REPAIR / CLOSURE Left 2014    infection in bone    COLONOSCOPY N/A 9/26/2019    Procedure: COLONOSCOPY WITH POLYPECTOMY X 2,;  Surgeon:  Sukhdev Silva MD;  Location: [facility] MAIN OR;  Service: Gastroenterology    MASS EXCISION N/A 9/26/2019    Procedure: Excision of perianal skin tags;  Surgeon: Robert Stinson MD;  Location: [facility] MAIN OR;  Service: General    SKIN TAG REMOVAL         Family History   Problem Relation Age of Onset    No Known Problems Mother     No Known Problems Father     No Known Problems Sister     No Known Problems Brother        Social History     Tobacco Use    Smoking status: Never    Smokeless tobacco: Never   Vaping Use    Vaping Use: Never used   Substance Use Topics    Alcohol use: No    Drug use: No        Medications Prior to Admission   Medication Sig Dispense Refill Last Dose    allopurinol (ZYLOPRIM) 100 MG tablet Take 1 tablet by mouth Daily.  6 2/1/2024    lisinopril (PRINIVIL,ZESTRIL) 40 MG tablet Take 1 tablet by mouth Daily.  5 2/1/2024         Patient has no known allergies.    Scheduled Meds:  allopurinol, 100 mg, Oral, Daily  carvedilol, 6.25 mg, Oral, BID With Meals  empagliflozin, 10 mg, Oral, Daily  furosemide, 40 mg, Intravenous, Q12H  [START ON 2/3/2024] losartan, 25 mg, Oral, Q24H        Continuous Infusions:  nitroglycerin,  mcg/min, Last Rate: Stopped (02/02/24 0900)        PRN Meds:    acetaminophen **OR** acetaminophen **OR** acetaminophen    senna-docusate sodium **AND** polyethylene glycol **AND** bisacodyl **AND** bisacodyl    melatonin    nitroglycerin    ondansetron    [COMPLETED] Insert Peripheral IV **AND** sodium chloride      VITAL SIGNS  Vitals:    02/02/24 0430 02/02/24 0752 02/02/24 0916 02/02/24 1254   BP: 137/91 125/86 113/72 132/90   BP Location: Left arm  Left arm Left arm   Patient Position: Lying  Lying Lying   Pulse: 87 89 97 85   Resp: 17  25 22   Temp: 98.1 °F (36.7 °C)  98.2 °F (36.8 °C) 98.8 °F (37.1 °C)   TempSrc: Oral  Oral Oral   SpO2: 92%      Weight: 83.2 kg (183 lb 6.8 oz)      Height:           Flowsheet Rows      Flowsheet Row First Filed  "Value   Admission Height 170.2 cm (67\") Documented at 02/01/2024 1722   Admission Weight 81.6 kg (180 lb) Documented at 02/01/2024 1722             TELEMETRY: Sinus tachycardia    Physical Exam:  Vitals reviewed.   Constitutional:       General: Awake.      Appearance: Overweight and not in distress.   Eyes:      Pupils: Pupils are equal, round, and reactive to light.   Pulmonary:      Effort: Pulmonary effort is normal.      Breath sounds: Normal breath sounds.   Cardiovascular:      Tachycardia present. Occasional ectopic beats. Regular rhythm. Normal S1. Normal S2.    Pulses:     Intact distal pulses.   Edema:     Peripheral edema absent.   Abdominal:      General: Bowel sounds are normal.   Musculoskeletal: Normal range of motion.      Cervical back: Normal range of motion. Skin:     General: Skin is warm and dry.   Neurological:      Mental Status: Alert and oriented to person, place and time.   Psychiatric:         Behavior: Behavior is agitated. Behavior is cooperative.            LAB RESULTS (LAST 7 DAYS)    CMP   Results from last 7 days   Lab Units 02/01/24 2232 02/01/24  1753   SODIUM mmol/L 139 142   POTASSIUM mmol/L 3.8 4.7   CHLORIDE mmol/L 104 108*   CO2 mmol/L 24.0 22.0   BUN mg/dL 16 18   CREATININE mg/dL 0.85 0.89   GLUCOSE mg/dL 107* 91   ALBUMIN g/dL  --  3.8   BILIRUBIN mg/dL  --  0.7   ALK PHOS U/L  --  84   AST (SGOT) U/L  --  42*   ALT (SGPT) U/L  --  28       BMP  Results from last 7 days   Lab Units 02/02/24  0918 02/01/24 2232 02/01/24  1753   SODIUM mmol/L  --  139 142   POTASSIUM mmol/L  --  3.8 4.7   CHLORIDE mmol/L  --  104 108*   CO2 mmol/L  --  24.0 22.0   BUN mg/dL  --  16 18   CREATININE mg/dL  --  0.85 0.89   GLUCOSE mg/dL  --  107* 91   MAGNESIUM mg/dL 1.9  --   --        CBC  Results from last 7 days   Lab Units 02/02/24  0447 02/01/24 2232 02/01/24  1753   WBC 10*3/mm3 9.40 9.80 11.80*   RBC 10*6/mm3 4.77 4.84 4.84   HEMOGLOBIN g/dL 14.4 14.3 15.1   HEMATOCRIT % 43.9 44.2 " 45.3   MCV fL 91.9 91.3 93.5   PLATELETS 10*3/mm3 154 176 173       ProBNP        TROPONIN  Results from last 7 days   Lab Units 02/01/24 2017   HSTROP T ng/L 27*       Creatinine Clearance  Estimated Creatinine Clearance: 96.5 mL/min (by C-G formula based on SCr of 0.85 mg/dL).      Radiology  XR Chest 1 View    Result Date: 2/1/2024  1.Ill-defined multifocal airspace infiltrates bilaterally with diffuse interstitial changes. The findings suggest developing atypical/viral infection or multifocal pneumonia. Changes of pulmonary edema could also be considered. Recommend correlation for signs or symptoms of acute infection and follow-up to ensure improvement/resolution. Electronically Signed: Yuval Blankenship MD  2/1/2024 5:49 PM EST  Workstation ID: [id number]     EKG    I personally viewed and interpreted the patient's EKG/Telemetry data:  ECG 12 Lead Chest Pain   Final Result   HEART RATE= 111  bpm   RR Interval= 540  ms   MA Interval= 165  ms   P Horizontal Axis= -4  deg   P Front Axis= 57  deg   QRSD Interval= 79  ms   QT Interval= 342  ms   QTcB= 465  ms   QRS Axis= 21  deg   T Wave Axis= 107  deg   - ABNORMAL ECG -   Sinus tachycardia   Paired ventricular premature complexes   Probable left atrial enlargement   Abnrm R prog, consider ASMI or lead placement   Nonspecific T abnormalities, lateral leads   When compared with ECG of 19-Sep-2019 15:25:50,   Significant rate increase   Significant repolarization change   Electronically Signed By: Hans Locke (Con) 02-Feb-2024 06:22:51   Date and Time of Study: 2024-02-01 17:08:30      SCANNED - TELEMETRY     Final Result      SCANNED - TELEMETRY     Final Result          ECHOCARDIOGRAM:      STRESS MYOVIEW:      CARDIAC CATHETERIZATION:  No results found for this or any previous visit.      OTHER:         Assessment & Plan       CHF (congestive heart failure)    Vitamin D deficiency    Hypertension    Arthritis      PLAN  John Morales is a 58-year-old male who  presented with dyspnea on exertion and lower extremity edema.  He was diagnosed with new onset congestive heart failure.  He was given IV Lasix in the ED and started on a Tridil drip which has since been discontinued.    New onset CHF  -Echocardiogram pending  -Stress test pending  -Troponin minimally elevated 19 and 27  -proBNP 2885  -Lipid panel within normal limits  -Continue Coreg, Jardiance, losartan  -Strict I&O's  -Heart failure nurse navigator consulted and has seen patient    Hypertension  -Continue losartan and Coreg, titrate as needed  -Lifestyle modifications encouraged    Continue current Rx and supportive care      I discussed the patients findings and my recommendations with patient and nurse.      KUN Cadet  02/02/24  15:05 EST  Electronically signed by KUN Cadet, 02/02/24, 3:27 PM EST.      Electronically signed by Linda Bond APRN at 02/02/24 1522

## 2024-02-02 NOTE — CONSULTS
"Heart Failure Program  Nurse Navigator  Discharge Planning    Patient Name:John Morales  :1965  Cardiologist:   Current Admission Date: 2024   Previous Admission:    Admission frequency: 1 admissions in 6 months    Heart Failure history per record:    Symptoms on admission:c/o SOA, swelling legs, orthopnea past 3 months. Pt was on blood pressure medications but has not taken in 3 months, no other medications.       Admission Weight:  Flowsheet Rows      Flowsheet Row First Filed Value   Admission Height 170.2 cm (67\") Documented at 2024 1722   Admission Weight 81.6 kg (180 lb) Documented at 2024 1722              Current Home Medications:  Prior to Admission medications    Medication Sig Start Date End Date Taking? Authorizing Provider   allopurinol (ZYLOPRIM) 100 MG tablet Take 1 tablet by mouth Daily. 19  Yes Gerda Almaraz MD   carvedilol (COREG) 6.25 MG tablet Take 1 tablet by mouth 2 (Two) Times a Day With Meals. 24  Yes Alberto Miguel MD   empagliflozin (JARDIANCE) 10 MG tablet tablet Take 1 tablet by mouth Daily. 2/3/24  Yes Alberto Miguel MD   furosemide (Lasix) 20 MG tablet Take 1 tablet by mouth 2 (Two) Times a Day. 24  Yes Alberto Miguel MD   lisinopril (PRINIVIL,ZESTRIL) 40 MG tablet Take 1 tablet by mouth Daily. 19  Yes Gerda Almaraz MD   carvedilol (COREG) 3.125 MG tablet Take 2 tablets by mouth 2 (Two) Times a Day With Meals. 24 Yes Alberto Miguel MD   losartan (Cozaar) 25 MG tablet Take 1 tablet by mouth Daily. 24   Alberto Miguel MD   furosemide (Lasix) 40 MG tablet Take 0.5 tablets by mouth 2 (Two) Times a Day. 24  Alberto Miguel MD       Social history:   Pt from home, pt previously on blood medication but has not taken for a few months. Pt does not have a cardiologist.    Smoking status:     Diagnostics Testing:  proBNP level: 2885    Echocardiogram:      Patient " Assessment:   Pt lying in bed, resp even and unlabored.no soa with conversation, no edema.     Current O2:    Home O2:      Education provided to patient:  yes- Heart Failure disease education  yes -Symptom identification/management  yes -Daily Weights  yes- Diet education  yes- Fluid restriction (if ordered)  yes- Activity education  yes- Medication education  na- Smoking cessation  yes- Follow-up Appointments  yes-Provided information on how to access [facility] My HF Guide/Heart Failure Interactive workbook    Acceptance of learning: acceptance, cooperative    Heart Failure education interactive teaching session time: 30 minutes    GWTG: pending echo    Identified needs/barriers:   Volume status, GDMT, pending echo. Needs 7 day follow-up, HF clinic information provided.     Intervention:   Education, HF clinic apt

## 2024-02-02 NOTE — H&P
[facility] Medicine Services  History & Physical    Patient Name: John Morales  : 1965  MRN: [id number]  Primary Care Physician:  Nora Sterling MD  Date of admission: 2024  Date and Time of Service: 2024 at 2015    Subjective      Chief Complaint: shortness of breath    History of Present Illness: Jonh Morales is a 58 y.o. male with a previous medical history of HTN who presented to [facility] on 2024 with  shortness of breath worse with exertion and cold weather and bilateral lower extremity swelling for the last three months.  He saw his PCP today who was concerned for heart failure and sent him to the ED for further evaluation.  The patient reports that he is supposed to take Amlodipine and Lisinopril but stopped months ago.  He denies chest pain or palpitations.     In the ED, BNP is 2885, Troponin 19, 27. Chest xray shows pulmonary edema versus multifocal pneumonia.  He is afebrile,  hypertensive at 146/96 and tachycardic at 113 on exam.   EKG reviewed, sinus tachycardia with PVCs and probably left atrial enlargement, .  He was started on a Tridil drip and given Lasix, 40mg IV in the ED.  Hospitalist was consulted for further care and management.    12 point ROS reviewed and negative except as mentioned above      Personal History     Past Medical History:   Diagnosis Date    Ankle fracture     right ankle    Arthritis     Gout    Back injury 2007    broke    CHF (congestive heart failure) 2024    Hypertension        Past Surgical History:   Procedure Laterality Date    ARM WOUND REPAIR / CLOSURE Left     infection in bone    COLONOSCOPY N/A 2019    Procedure: COLONOSCOPY WITH POLYPECTOMY X 2,;  Surgeon: Sukhdev Silva MD;  Location: [facility] OR;  Service: Gastroenterology    MASS EXCISION N/A 2019    Procedure: Excision of perianal skin tags;  Surgeon: Robert Stinson MD;  Location: [facility] OR;   Service: General    SKIN TAG REMOVAL         Family History: family history includes No Known Problems in his brother, father, mother, and sister. Otherwise pertinent FHx was reviewed and not pertinent to current issue.    Social History:  reports that he has never smoked. He has never used smokeless tobacco. He reports that he does not drink alcohol and does not use drugs.    Home Medications:  Prior to Admission Medications       Prescriptions Last Dose Informant Patient Reported? Taking?    allopurinol (ZYLOPRIM) 100 MG tablet   Yes No    Take 100 mg by mouth Daily.    amLODIPine (NORVASC) 10 MG tablet   Yes No    Take 10 mg by mouth Daily.    amoxicillin-clavulanate (AUGMENTIN) 875-125 MG per tablet   No No    Take 1 tablet by mouth Every 12 (Twelve) Hours.    gabapentin (NEURONTIN) 300 MG capsule   No No    One capsule  By mouth first day.  One capsule bid second day and then TID till almost gone and taper off    HYDROcodone-acetaminophen (NORCO) 5-325 MG per tablet   No No    Take 1 tablet by mouth Every 4 (Four) Hours As Needed for Mild Pain .    lisinopril (PRINIVIL,ZESTRIL) 40 MG tablet   Yes No    Take 40 mg by mouth Daily.    tobramycin-dexamethasone (TOBRADEX) 0.3-0.1 % ophthalmic suspension   Yes No    INSTILL 1 DROP INTO RIGHT EYE 4 TIMES DAILY    trifluridine (VIROPTIC) 1 % ophthalmic solution   Yes No    INSTILL 1 DROP IN RIGHT EYE FIVE TIMES DAILY    valACYclovir (Valtrex) 1000 MG tablet   No No    Take 1 tablet by mouth 3 (Three) Times a Day.          Allergies:  No Known Allergies    Objective      Vitals:   Temp:  [99.5 °F (37.5 °C)] 99.5 °F (37.5 °C)  Heart Rate:  [] 103  Resp:  [20-26] 26  BP: (136-153)/() 145/98  Body mass index is 28.19 kg/m².  Physical Exam  Vitals and nursing note reviewed.   Constitutional:       Appearance: Normal appearance.   HENT:      Head: Normocephalic and atraumatic.      Nose: Nose normal.      Mouth/Throat:      Mouth: Mucous membranes are moist.    Eyes:      Extraocular Movements: Extraocular movements intact.      Pupils: Pupils are equal, round, and reactive to light.   Cardiovascular:      Rate and Rhythm: Regular rhythm. Tachycardia present. Occasional Extrasystoles are present.     Pulses: Normal pulses.      Heart sounds: Normal heart sounds.   Pulmonary:      Effort: Pulmonary effort is normal.      Breath sounds: Examination of the right-lower field reveals decreased breath sounds. Examination of the left-lower field reveals decreased breath sounds. Decreased breath sounds present.   Abdominal:      General: Bowel sounds are normal.      Palpations: Abdomen is soft.   Musculoskeletal:         General: Normal range of motion.      Cervical back: Normal range of motion.      Right lower le+ Edema present.      Left lower le+ Edema present.   Skin:     General: Skin is warm and dry.   Neurological:      General: No focal deficit present.      Mental Status: He is alert and oriented to person, place, and time. Mental status is at baseline.   Psychiatric:         Mood and Affect: Mood normal.         Behavior: Behavior normal. Behavior is agitated. Behavior is cooperative.           Assessment & Plan        This is a 58 y.o. male with:    Active and Resolved Problems  Active Hospital Problems    Diagnosis  POA    **CHF (congestive heart failure) [I50.9]  Yes      Resolved Hospital Problems   No resolved problems to display.       Plan:    Home medications not verified at the time of assessment and plan    Congestive Heart Failure-New Diagnosis  Essential Hypertension-Uncontrolled due to non compliance with medication  -BNP 2885  -Initial Troponin 19, 27  -EKG reviewed  -Chest xray reviewed, pulmonary edema noted  -Tridil drip started in ED for BP control, continue and wean as tolerated  -Restart home BP medications when verified  -Lasix 40mg IV given in ED, continue Q12H  -ECHO ordered  -Continuous cardiac monitoring  -Heart Failure Pathway  initiated  Coreg 3.125mg BID  Jardiance 10mg daily  Daily weights  Strict I&O  Heart Failure Navigator Consult  -Cardiology consult      DVT prophylaxis:  Mechanical DVT prophylaxis orders are present.        CODE STATUS:    Code Status (Patient has no pulse and is not breathing): CPR (Attempt to Resuscitate)  Medical Interventions (Patient has pulse or is breathing): Full Support        Admission Status:  I believe this patient meets inpatient status.    I discussed the patient's findings and my recommendations with patient and nursing staff.    Signature:     This document has been electronically signed by Nallely Us DNP, APRN on February 1, 2024 20:37 [facility]

## 2024-02-03 ENCOUNTER — APPOINTMENT (OUTPATIENT)
Dept: NUCLEAR MEDICINE | Facility: HOSPITAL | Age: 59
DRG: 321 | End: 2024-02-03
Payer: COMMERCIAL

## 2024-02-03 ENCOUNTER — APPOINTMENT (OUTPATIENT)
Dept: CARDIOLOGY | Facility: HOSPITAL | Age: 59
DRG: 321 | End: 2024-02-03
Payer: COMMERCIAL

## 2024-02-03 PROBLEM — R94.39 ABNORMAL NUCLEAR STRESS TEST: Status: ACTIVE | Noted: 2024-02-01

## 2024-02-03 PROBLEM — I50.21 ACUTE HFREF (HEART FAILURE WITH REDUCED EJECTION FRACTION): Status: ACTIVE | Noted: 2024-02-03

## 2024-02-03 LAB
ANION GAP SERPL CALCULATED.3IONS-SCNC: 14 MMOL/L (ref 5–15)
BH CV ECHO MEAS - AO MAX PG: 8.4 MMHG
BH CV ECHO MEAS - AO MEAN PG: 4 MMHG
BH CV ECHO MEAS - AO V2 MAX: 145 CM/SEC
BH CV ECHO MEAS - AO V2 VTI: 24.5 CM
BH CV ECHO MEAS - AVA(I,D): 1.15 CM2
BH CV ECHO MEAS - EDV(CUBED): 262.1 ML
BH CV ECHO MEAS - EDV(MOD-SP4): 207 ML
BH CV ECHO MEAS - EF(MOD-BP): 29 %
BH CV ECHO MEAS - EF(MOD-SP4): 29.5 %
BH CV ECHO MEAS - ESV(CUBED): 185.2 ML
BH CV ECHO MEAS - ESV(MOD-SP4): 146 ML
BH CV ECHO MEAS - FS: 10.9 %
BH CV ECHO MEAS - IVS/LVPW: 0.89 CM
BH CV ECHO MEAS - IVSD: 0.8 CM
BH CV ECHO MEAS - LA DIMENSION: 5.2 CM
BH CV ECHO MEAS - LAT PEAK E' VEL: 5.4 CM/SEC
BH CV ECHO MEAS - LV MASS(C)D: 224.7 GRAMS
BH CV ECHO MEAS - LV MAX PG: 0.87 MMHG
BH CV ECHO MEAS - LV MEAN PG: 0 MMHG
BH CV ECHO MEAS - LV V1 MAX: 46.6 CM/SEC
BH CV ECHO MEAS - LV V1 VTI: 7.4 CM
BH CV ECHO MEAS - LVIDD: 6.4 CM
BH CV ECHO MEAS - LVIDS: 5.7 CM
BH CV ECHO MEAS - LVOT AREA: 3.8 CM2
BH CV ECHO MEAS - LVOT DIAM: 2.2 CM
BH CV ECHO MEAS - LVPWD: 0.9 CM
BH CV ECHO MEAS - MED PEAK E' VEL: 4.4 CM/SEC
BH CV ECHO MEAS - MR MAX PG: 24.8 MMHG
BH CV ECHO MEAS - MR MAX VEL: 249 CM/SEC
BH CV ECHO MEAS - MV A MAX VEL: 71.8 CM/SEC
BH CV ECHO MEAS - MV DEC SLOPE: 300 CM/SEC2
BH CV ECHO MEAS - MV DEC TIME: 0.13 SEC
BH CV ECHO MEAS - MV E MAX VEL: 77.6 CM/SEC
BH CV ECHO MEAS - MV E/A: 1.08
BH CV ECHO MEAS - MV MAX PG: 1.71 MMHG
BH CV ECHO MEAS - MV MEAN PG: 1 MMHG
BH CV ECHO MEAS - MV P1/2T: 65.6 MSEC
BH CV ECHO MEAS - MV V2 VTI: 17.2 CM
BH CV ECHO MEAS - MVA(P1/2T): 3.4 CM2
BH CV ECHO MEAS - MVA(VTI): 1.63 CM2
BH CV ECHO MEAS - PA V2 MAX: 82.4 CM/SEC
BH CV ECHO MEAS - RV MAX PG: 1.29 MMHG
BH CV ECHO MEAS - RV V1 MAX: 56.7 CM/SEC
BH CV ECHO MEAS - RV V1 VTI: 9 CM
BH CV ECHO MEAS - SV(LVOT): 28.1 ML
BH CV ECHO MEAS - SV(MOD-SP4): 61 ML
BH CV ECHO MEAS - TAPSE (>1.6): 2.04 CM
BH CV ECHO MEASUREMENTS AVERAGE E/E' RATIO: 15.84
BH CV NUCLEAR PRIOR STUDY: 2
BH CV REST NUCLEAR ISOTOPE DOSE: 11 MCI
BH CV STRESS BP STAGE 1: NORMAL
BH CV STRESS BP STAGE 2: NORMAL
BH CV STRESS BP STAGE 3: NORMAL
BH CV STRESS BP STAGE 4: NORMAL
BH CV STRESS COMMENTS STAGE 1: NORMAL
BH CV STRESS DOSE REGADENOSON STAGE 1: 0.4
BH CV STRESS DURATION MIN STAGE 1: 1
BH CV STRESS DURATION MIN STAGE 2: 1
BH CV STRESS DURATION MIN STAGE 3: 1
BH CV STRESS DURATION MIN STAGE 4: 1
BH CV STRESS DURATION SEC STAGE 2: 0
BH CV STRESS HR STAGE 1: 102
BH CV STRESS HR STAGE 2: 105
BH CV STRESS HR STAGE 3: 102
BH CV STRESS HR STAGE 4: 106
BH CV STRESS NUCLEAR ISOTOPE DOSE: 32.7 MCI
BH CV STRESS PROTOCOL 1: NORMAL
BH CV STRESS RECOVERY BP: NORMAL MMHG
BH CV STRESS RECOVERY HR: 103 BPM
BH CV STRESS STAGE 1: 1
BH CV STRESS STAGE 2: 2
BH CV STRESS STAGE 3: 3
BH CV STRESS STAGE 4: 4
BH CV XLRA - TDI S': 11.2 CM/SEC
BUN SERPL-MCNC: 19 MG/DL (ref 6–20)
BUN/CREAT SERPL: 17.9 (ref 7–25)
CALCIUM SPEC-SCNC: 9.2 MG/DL (ref 8.6–10.5)
CHLORIDE SERPL-SCNC: 104 MMOL/L (ref 98–107)
CO2 SERPL-SCNC: 26 MMOL/L (ref 22–29)
CREAT SERPL-MCNC: 1.06 MG/DL (ref 0.76–1.27)
DEPRECATED RDW RBC AUTO: 44.6 FL (ref 37–54)
EGFRCR SERPLBLD CKD-EPI 2021: 80.8 ML/MIN/1.73
ERYTHROCYTE [DISTWIDTH] IN BLOOD BY AUTOMATED COUNT: 13.4 % (ref 12.3–15.4)
GLUCOSE SERPL-MCNC: 93 MG/DL (ref 65–99)
HBA1C MFR BLD: 5.5 % (ref 4.8–5.6)
HCT VFR BLD AUTO: 47.3 % (ref 37.5–51)
HGB BLD-MCNC: 15.4 G/DL (ref 13–17.7)
LV EF NUC BP: 27 %
MAXIMAL PREDICTED HEART RATE: 161 BPM
MCH RBC QN AUTO: 29.7 PG (ref 26.6–33)
MCHC RBC AUTO-ENTMCNC: 32.6 G/DL (ref 31.5–35.7)
MCV RBC AUTO: 91 FL (ref 79–97)
PERCENT MAX PREDICTED HR: 65.84 %
PLATELET # BLD AUTO: 164 10*3/MM3 (ref 140–450)
PMV BLD AUTO: 10.3 FL (ref 6–12)
POTASSIUM SERPL-SCNC: 3.8 MMOL/L (ref 3.5–5.2)
RBC # BLD AUTO: 5.2 10*6/MM3 (ref 4.14–5.8)
SINUS: 2.9 CM
SODIUM SERPL-SCNC: 144 MMOL/L (ref 136–145)
STJ: 2.5 CM
STRESS BASELINE BP: NORMAL MMHG
STRESS BASELINE HR: 86 BPM
STRESS PERCENT HR: 77 %
STRESS POST PEAK BP: NORMAL MMHG
STRESS POST PEAK HR: 106 BPM
STRESS TARGET HR: 137 BPM
WBC NRBC COR # BLD AUTO: 10.3 10*3/MM3 (ref 3.4–10.8)

## 2024-02-03 PROCEDURE — 93306 TTE W/DOPPLER COMPLETE: CPT

## 2024-02-03 PROCEDURE — 0 TECHNETIUM TETROFOSMIN KIT: Performed by: INTERNAL MEDICINE

## 2024-02-03 PROCEDURE — G0378 HOSPITAL OBSERVATION PER HR: HCPCS

## 2024-02-03 PROCEDURE — 93306 TTE W/DOPPLER COMPLETE: CPT | Performed by: INTERNAL MEDICINE

## 2024-02-03 PROCEDURE — 25010000002 REGADENOSON 0.4 MG/5ML SOLUTION: Performed by: INTERNAL MEDICINE

## 2024-02-03 PROCEDURE — 83036 HEMOGLOBIN GLYCOSYLATED A1C: CPT | Performed by: NURSE PRACTITIONER

## 2024-02-03 PROCEDURE — 85027 COMPLETE CBC AUTOMATED: CPT | Performed by: INTERNAL MEDICINE

## 2024-02-03 PROCEDURE — A9502 TC99M TETROFOSMIN: HCPCS | Performed by: INTERNAL MEDICINE

## 2024-02-03 PROCEDURE — 25010000002 FUROSEMIDE PER 20 MG: Performed by: INTERNAL MEDICINE

## 2024-02-03 PROCEDURE — 80048 BASIC METABOLIC PNL TOTAL CA: CPT | Performed by: INTERNAL MEDICINE

## 2024-02-03 PROCEDURE — 99233 SBSQ HOSP IP/OBS HIGH 50: CPT | Performed by: INTERNAL MEDICINE

## 2024-02-03 RX ORDER — REGADENOSON 0.08 MG/ML
0.4 INJECTION, SOLUTION INTRAVENOUS
Status: COMPLETED | OUTPATIENT
Start: 2024-02-03 | End: 2024-02-03

## 2024-02-03 RX ADMIN — EMPAGLIFLOZIN 10 MG: 10 TABLET, FILM COATED ORAL at 11:30

## 2024-02-03 RX ADMIN — LOSARTAN POTASSIUM 25 MG: 25 TABLET, FILM COATED ORAL at 11:30

## 2024-02-03 RX ADMIN — Medication 10 ML: at 11:30

## 2024-02-03 RX ADMIN — FUROSEMIDE 40 MG: 10 INJECTION, SOLUTION INTRAMUSCULAR; INTRAVENOUS at 05:54

## 2024-02-03 RX ADMIN — ALLOPURINOL 100 MG: 100 TABLET ORAL at 11:29

## 2024-02-03 RX ADMIN — REGADENOSON 0.4 MG: 0.08 INJECTION, SOLUTION INTRAVENOUS at 10:31

## 2024-02-03 RX ADMIN — TETROFOSMIN 1 DOSE: 1.38 INJECTION, POWDER, LYOPHILIZED, FOR SOLUTION INTRAVENOUS at 10:31

## 2024-02-03 RX ADMIN — CARVEDILOL 6.25 MG: 6.25 TABLET, FILM COATED ORAL at 11:29

## 2024-02-03 RX ADMIN — FUROSEMIDE 40 MG: 10 INJECTION, SOLUTION INTRAMUSCULAR; INTRAVENOUS at 17:25

## 2024-02-03 NOTE — PROGRESS NOTES
[facility] MEDICINE SERVICE  DAILY PROGRESS NOTE    NAME: John Morales  : 1965  MRN: [id number]      LOS: 1 day     PROVIDER OF SERVICE: Alberto Miguel MD    Chief Complaint: CHF (congestive heart failure)    Subjective:     Interval History:  History taken from: patient  Patient Complaints:  shortness of breath     History of Present Illness: John Morales is a 58 y.o. male with a previous medical history of HTN who presented to [facility] on 2024 with  shortness of breath worse with exertion and cold weather and bilateral lower extremity swelling for the last three months.  He saw his PCP today who was concerned for heart failure and sent him to the ED for further evaluation.  The patient reports that he is supposed to take Amlodipine and Lisinopril but stopped months ago.  He denies chest pain or palpitations.      In the ED, BNP is 2885, Troponin 19, 27. Chest xray shows pulmonary edema versus multifocal pneumonia.  He is afebrile,  hypertensive at 146/96 and tachycardic at 113 on exam.   EKG reviewed, sinus tachycardia with PVCs and probably left atrial enlargement, .  He was started on a Tridil drip and given Lasix, 40mg IV in the ED.  Hospitalist was consulted for further care and management.     24 seen in bed NAD, no new complaints, anxious to go home, GERRY RN  2/3/2024 patient seen and examined in bed no acute distress, underwent stress test and echo.  Pending results, discussed with RN.  Will discharge home if stress test and echo are normal.    Review of Systems  12 point ROS reviewed and negative except as mentioned above    Objective:     Vital Signs  Temp:  [97.9 °F (36.6 °C)-99 °F (37.2 °C)] 98.8 °F (37.1 °C)  Heart Rate:  [85-96] 95  Resp:  [15-22] 16  BP: (108-144)/(76-92) 137/92   Body mass index is 26.14 kg/m².      Physical Exam  Vitals and nursing note reviewed.   Constitutional:       Appearance: Normal appearance.   HENT:      Head:  Normocephalic and atraumatic.      Nose: Nose normal.      Mouth/Throat:      Mouth: Mucous membranes are moist.   Eyes:      Extraocular Movements: Extraocular movements intact.      Pupils: Pupils are equal, round, and reactive to light.   Cardiovascular:      Rate and Rhythm: Regular rhythm. Tachycardia present. Occasional Extrasystoles are present.     Pulses: Normal pulses.      Heart sounds: Normal heart sounds.   Pulmonary:      Effort: Pulmonary effort is normal.      Breath sounds: Examination of the right-lower field reveals decreased breath sounds. Examination of the left-lower field reveals decreased breath sounds. Decreased breath sounds present.   Abdominal:      General: Bowel sounds are normal.      Palpations: Abdomen is soft.   Musculoskeletal:         General: Normal range of motion.      Cervical back: Normal range of motion.      Right lower le+ Edema present.      Left lower le+ Edema present.   Skin:     General: Skin is warm and dry.   Neurological:      General: No focal deficit present.      Mental Status: He is alert and oriented to person, place, and time. Mental status is at baseline.   Psychiatric:         Mood and Affect: Mood normal.         Behavior: Behavior normal. Behavior is agitated. Behavior is cooperative.      Scheduled Meds   allopurinol, 100 mg, Oral, Daily  carvedilol, 6.25 mg, Oral, BID With Meals  empagliflozin, 10 mg, Oral, Daily  furosemide, 40 mg, Intravenous, Q12H  losartan, 25 mg, Oral, Q24H       PRN Meds     acetaminophen **OR** acetaminophen **OR** acetaminophen    senna-docusate sodium **AND** polyethylene glycol **AND** bisacodyl **AND** bisacodyl    melatonin    nitroglycerin    ondansetron    [COMPLETED] Insert Peripheral IV **AND** sodium chloride   Infusions  nitroglycerin,  mcg/min, Last Rate: Stopped (24 0900)          Diagnostic Data    Results from last 7 days   Lab Units 24  0202 24  2232 24  1753   WBC 10*3/mm3  10.30   < > 11.80*   HEMOGLOBIN g/dL 15.4   < > 15.1   HEMATOCRIT % 47.3   < > 45.3   PLATELETS 10*3/mm3 164   < > 173   GLUCOSE mg/dL 93   < > 91   CREATININE mg/dL 1.06   < > 0.89   BUN mg/dL 19   < > 18   SODIUM mmol/L 144   < > 142   POTASSIUM mmol/L 3.8   < > 4.7   AST (SGOT) U/L  --   --  42*   ALT (SGPT) U/L  --   --  28   ALK PHOS U/L  --   --  84   BILIRUBIN mg/dL  --   --  0.7   ANION GAP mmol/L 14.0   < > 12.0    < > = values in this interval not displayed.       XR Chest 1 View    Result Date: 2/1/2024  1.Ill-defined multifocal airspace infiltrates bilaterally with diffuse interstitial changes. The findings suggest developing atypical/viral infection or multifocal pneumonia. Changes of pulmonary edema could also be considered. Recommend correlation for signs or symptoms of acute infection and follow-up to ensure improvement/resolution. Electronically Signed: Yuval Blankenship MD  2/1/2024 5:49 PM EST  Workstation ID: [id number]       I reviewed the patient's new clinical results.    Assessment/Plan:     Active and Resolved Problems  Active Hospital Problems    Diagnosis  POA    **CHF (congestive heart failure) [I50.9]  Yes    Acute HFrEF (heart failure with reduced ejection fraction) [I50.21]  Yes    Hypertension [I10]  Yes    Arthritis [M19.90]  Yes    Vitamin D deficiency [E55.9]  Yes      Resolved Hospital Problems   No resolved problems to display.         Congestive Heart Failure-New Diagnosis  Essential Hypertension-Uncontrolled due to non compliance with medication  -BNP 2885  -Initial Troponin 19, 27  -EKG reviewed  -Chest xray reviewed, pulmonary edema noted  -Tridil drip started in ED for BP control, continue and wean as tolerated  -Restart home BP medications when verified  -Lasix 40mg IV given in ED, continue Q12H  -ECHO and stress test ordered  -Continuous cardiac monitoring  -Heart Failure Pathway initiated  Coreg 3.125mg BID  Jardiance 10mg daily  Daily weights  Strict I&O  Heart Failure  Navigator Consult  -Cardiology consulted       DVT prophylaxis:  Mechanical DVT prophylaxis orders are present.         Code status is   Code Status and Medical Interventions:   Ordered at: 02/01/24 2036     Code Status (Patient has no pulse and is not breathing):    CPR (Attempt to Resuscitate)     Medical Interventions (Patient has pulse or is breathing):    Full Support       Plan for disposition:home in 1 days    Time: 30 minutes    Signature: Electronically signed by Alberto Miguel MD, 02/03/24, 12:19 EST.  [facility] Hospitalist Team

## 2024-02-03 NOTE — PROGRESS NOTES
Referring Provider: Alberto Miguel MD    Reason for follow-up: New onset HFrEF     Patient Care Team:  Nora Sterling MD as PCP - General (Family Medicine)  Giorgio Kennedy MD as Consulting Physician (Gastroenterology)      SUBJECTIVE  Echocardiogram and stress test were abnormal today.  He is denying any chest pain.     ROS  Review of all systems negative except as indicated.    Since I have last seen, the patient has been without any chest discomfort, shortness of breath, palpitations, dizziness or syncope.  Denies having any headache, abdominal pain, nausea, vomiting, diarrhea, constipation, loss of weight or loss of appetite.  Denies having any excessive bruising, hematuria or blood in the stool.  ROS      Personal History:    Past Medical History:   Diagnosis Date    Ankle fracture     right ankle    Arthritis     Gout    Back injury 07/01/2007    broke    CHF (congestive heart failure) 2/1/2024    Hypertension        Past Surgical History:   Procedure Laterality Date    ARM WOUND REPAIR / CLOSURE Left 2014    infection in bone    COLONOSCOPY N/A 9/26/2019    Procedure: COLONOSCOPY WITH POLYPECTOMY X 2,;  Surgeon: Sukhdev Silva MD;  Location: [facility] OR;  Service: Gastroenterology    MASS EXCISION N/A 9/26/2019    Procedure: Excision of perianal skin tags;  Surgeon: Robert Stinson MD;  Location: [facility] MAIN OR;  Service: General    SKIN TAG REMOVAL         Family History   Problem Relation Age of Onset    No Known Problems Mother     No Known Problems Father     No Known Problems Sister     No Known Problems Brother        Social History     Tobacco Use    Smoking status: Never    Smokeless tobacco: Never   Vaping Use    Vaping Use: Never used   Substance Use Topics    Alcohol use: No    Drug use: No        Home meds:  Prior to Admission medications    Medication Sig Start Date End Date Taking? Authorizing Provider   allopurinol (ZYLOPRIM) 100 MG tablet Take 1  "tablet by mouth Daily. 6/22/19  Yes Provider, MD Gerda   carvedilol (COREG) 6.25 MG tablet Take 1 tablet by mouth 2 (Two) Times a Day With Meals. 2/2/24  Yes Alberto Miguel MD   empagliflozin (JARDIANCE) 10 MG tablet tablet Take 1 tablet by mouth Daily. 2/3/24  Yes Alberto Miguel MD   furosemide (Lasix) 20 MG tablet Take 1 tablet by mouth 2 (Two) Times a Day. 2/2/24  Yes Alberto Miguel MD   lisinopril (PRINIVIL,ZESTRIL) 40 MG tablet Take 1 tablet by mouth Daily. 6/22/19  Yes ProviderGerda MD   losartan (Cozaar) 25 MG tablet Take 1 tablet by mouth Daily. 2/2/24   Alberto Miguel MD       Allergies:  Patient has no known allergies.    Scheduled Meds:allopurinol, 100 mg, Oral, Daily  carvedilol, 6.25 mg, Oral, BID With Meals  empagliflozin, 10 mg, Oral, Daily  furosemide, 40 mg, Intravenous, Q12H  losartan, 25 mg, Oral, Q24H      Continuous Infusions:nitroglycerin,  mcg/min, Last Rate: Stopped (02/02/24 0900)      PRN Meds:.  acetaminophen **OR** acetaminophen **OR** acetaminophen    senna-docusate sodium **AND** polyethylene glycol **AND** bisacodyl **AND** bisacodyl    melatonin    nitroglycerin    ondansetron    [COMPLETED] Insert Peripheral IV **AND** sodium chloride      OBJECTIVE    Vital Signs  Vitals:    02/03/24 0530 02/03/24 0810 02/03/24 0926 02/03/24 1328   BP: 128/83  137/92    BP Location: Left arm  Left arm    Patient Position: Lying  Lying    Pulse: 93 86 95    Resp: 16  16    Temp: 99 °F (37.2 °C)  98.8 °F (37.1 °C)    TempSrc: Oral  Oral    SpO2: 92%      Weight: 75.7 kg (166 lb 14.2 oz)   75.3 kg (166 lb)   Height:    170.2 cm (67\")       Flowsheet Rows      Flowsheet Row First Filed Value   Admission Height 170.2 cm (67\") Documented at 02/01/2024 1722   Admission Weight 81.6 kg (180 lb) Documented at 02/01/2024 1722              Intake/Output Summary (Last 24 hours) at 2/3/2024 1529  Last data filed at 2/3/2024 1200  Gross per 24 hour   Intake 240 ml "   Output 400 ml   Net -160 ml          Telemetry: Sinus rhythm    Physical Exam:  The patient is alert, oriented and in no distress.  Vital signs as noted above.  Head and neck revealed no carotid bruits or jugular venous distention.  No thyromegaly or lymphadenopathy is present  Lungs clear.  No wheezing.  Breath sounds are normal bilaterally.  Heart normal first and second heart sounds.  No murmur. No precordial rub is present.  No gallop is present.  Abdomen soft and nontender.  No organomegaly is present.  Extremities with good peripheral pulses without any pedal edema.  Skin warm and dry.  Musculoskeletal system is grossly normal.  CNS grossly normal.       Results Review:  I have personally reviewed the results from the time of this admission to 2/3/2024 15:29 EST and agree with these findings:  []  Laboratory  []  Microbiology  []  Radiology  []  EKG/Telemetry   []  Cardiology/Vascular   []  Pathology  []  Old records  []  Other:    Most notable findings include:    Lab Results (last 24 hours)       Procedure Component Value Units Date/Time    Hemoglobin A1c [[id number]]  (Normal) Collected: 02/03/24 0202    Specimen: Blood Updated: 02/03/24 0311     Hemoglobin A1C 5.50 %     Basic Metabolic Panel [[id number]]  (Normal) Collected: 02/03/24 0202    Specimen: Blood Updated: 02/03/24 0309     Glucose 93 mg/dL      BUN 19 mg/dL      Creatinine 1.06 mg/dL      Sodium 144 mmol/L      Potassium 3.8 mmol/L      Chloride 104 mmol/L      CO2 26.0 mmol/L      Calcium 9.2 mg/dL      BUN/Creatinine Ratio 17.9     Anion Gap 14.0 mmol/L      eGFR 80.8 mL/min/1.73     Narrative:      GFR Normal >60  Chronic Kidney Disease <60  Kidney Failure <15      CBC (No Diff) [[id number]]  (Normal) Collected: 02/03/24 0202    Specimen: Blood Updated: 02/03/24 0253     WBC 10.30 10*3/mm3      RBC 5.20 10*6/mm3      Hemoglobin 15.4 g/dL      Hematocrit 47.3 %      MCV 91.0 fL      MCH 29.7 pg      MCHC 32.6 g/dL      RDW 13.4 %      RDW-SD  44.6 fl      MPV 10.3 fL      Platelets 164 10*3/mm3             Imaging Results (Last 24 Hours)       ** No results found for the last 24 hours. **            LAB RESULTS (LAST 7 DAYS)    CBC  Results from last 7 days   Lab Units 02/03/24 0202 02/02/24 0447 02/01/24 2232 02/01/24  1753   WBC 10*3/mm3 10.30 9.40 9.80 11.80*   RBC 10*6/mm3 5.20 4.77 4.84 4.84   HEMOGLOBIN g/dL 15.4 14.4 14.3 15.1   HEMATOCRIT % 47.3 43.9 44.2 45.3   MCV fL 91.0 91.9 91.3 93.5   PLATELETS 10*3/mm3 164 154 176 173       BMP  Results from last 7 days   Lab Units 02/03/24 0202 02/02/24 0918 02/01/24 2232 02/01/24  1753   SODIUM mmol/L 144  --  139 142   POTASSIUM mmol/L 3.8  --  3.8 4.7   CHLORIDE mmol/L 104  --  104 108*   CO2 mmol/L 26.0  --  24.0 22.0   BUN mg/dL 19  --  16 18   CREATININE mg/dL 1.06  --  0.85 0.89   GLUCOSE mg/dL 93  --  107* 91   MAGNESIUM mg/dL  --  1.9  --   --        CMP   Results from last 7 days   Lab Units 02/03/24 0202 02/01/24 2232 02/01/24  1753   SODIUM mmol/L 144 139 142   POTASSIUM mmol/L 3.8 3.8 4.7   CHLORIDE mmol/L 104 104 108*   CO2 mmol/L 26.0 24.0 22.0   BUN mg/dL 19 16 18   CREATININE mg/dL 1.06 0.85 0.89   GLUCOSE mg/dL 93 107* 91   ALBUMIN g/dL  --   --  3.8   BILIRUBIN mg/dL  --   --  0.7   ALK PHOS U/L  --   --  84   AST (SGOT) U/L  --   --  42*   ALT (SGPT) U/L  --   --  28       BNP        TROPONIN  Results from last 7 days   Lab Units 02/01/24 2017   HSTROP T ng/L 27*       CoAg        Creatinine Clearance  Estimated Creatinine Clearance: 79.9 mL/min (by C-G formula based on SCr of 1.06 mg/dL).    ABG        Radiology  XR Chest 1 View    Result Date: 2/1/2024  1.Ill-defined multifocal airspace infiltrates bilaterally with diffuse interstitial changes. The findings suggest developing atypical/viral infection or multifocal pneumonia. Changes of pulmonary edema could also be considered. Recommend correlation for signs or symptoms of acute infection and follow-up to ensure  improvement/resolution. Electronically Signed: Yuval Blankenship MD  2/1/2024 5:49 PM EST  Workstation ID: [id number]       EKG  I personally viewed and interpreted the patient's EKG/Telemetry data:  ECG 12 Lead Chest Pain   Final Result   HEART RATE= 111  bpm   RR Interval= 540  ms   OK Interval= 165  ms   P Horizontal Axis= -4  deg   P Front Axis= 57  deg   QRSD Interval= 79  ms   QT Interval= 342  ms   QTcB= 465  ms   QRS Axis= 21  deg   T Wave Axis= 107  deg   - ABNORMAL ECG -   Sinus tachycardia   Paired ventricular premature complexes   Probable left atrial enlargement   Abnrm R prog, consider ASMI or lead placement   Nonspecific T abnormalities, lateral leads   When compared with ECG of 19-Sep-2019 15:25:50,   Significant rate increase   Significant repolarization change   Electronically Signed By: Hans Locke (Con) 02-Feb-2024 06:22:51   Date and Time of Study: 2024-02-01 17:08:30      SCANNED - TELEMETRY     Final Result      SCANNED - TELEMETRY     Final Result      SCANNED - TELEMETRY     Final Result      SCANNED - TELEMETRY     Final Result            Echocardiogram:    Results for orders placed during the hospital encounter of 02/01/24    Adult Transthoracic Echo Complete W/ Cont if Necessary Per Protocol    Interpretation Summary    Left ventricular systolic function is moderately decreased. Calculated left ventricular EF = 29% Left ventricular ejection fraction appears to be 26 - 30%.    The left ventricular cavity is severely dilated.    Left ventricular diastolic function is consistent with (grade II w/high LAP) pseudonormalization.    The left atrial cavity is dilated.    Estimated right ventricular systolic pressure from tricuspid regurgitation is normal (<35 mmHg).    No significant valvular abnormalities noted.        Stress Test:  Results for orders placed during the hospital encounter of 02/01/24    Stress Test With Myocardial Perfusion One Day    Interpretation Summary    Findings consistent  with a normal ECG stress test.    Left ventricular ejection fraction is moderately reduced (Calculated EF = 27%).    Myocardial perfusion imaging indicates a large-sized, severe area of ischemia located in the inferior wall.    Impressions are consistent with a high risk study.         Cardiac Catheterization:  No results found for this or any previous visit.         Other:         ASSESSMENT & PLAN:    Principal Problem:    CHF (congestive heart failure)  Active Problems:    Vitamin D deficiency    Hypertension    Arthritis    Acute HFrEF (heart failure with reduced ejection fraction)    Acute HFrEF  Echocardiogram shows EF of 29% with grade 2 diastolic dysfunction  Elevated proBNP, minimally elevated high-sensitivity troponin.  Continue diuretics  Added Jardiance and beta-blocker  Added losartan  Proceed with cardiac catheterization tomorrow    Coronary artery disease  Nuclear stress test is abnormal  Plan for right and left heart cath  Procedure explained to the patient  Risk and benefit of the procedure discussed with the patient     Hypertension  Well-controlled on losartan and beta-blocker     Hyperlipidemia  LDL 38, HDL 45, triglyceride 93 and total cholesterol 101  Currently no indication for a statin     Overweight  BMI is 26  He weighs 166 pounds  Lifestyle modifications recommended to the patient.      Yaw Zavaleta MD  02/03/24  15:29 EST

## 2024-02-03 NOTE — PLAN OF CARE
Goal Outcome Evaluation:      Pt had abnormal stress test, so plan is to have heart cath tomorrow afternoon. Education given.No complains of pain. Continue to monitor

## 2024-02-04 ENCOUNTER — APPOINTMENT (OUTPATIENT)
Dept: GENERAL RADIOLOGY | Facility: HOSPITAL | Age: 59
DRG: 321 | End: 2024-02-04
Payer: COMMERCIAL

## 2024-02-04 LAB
ACT BLD: 212 SECONDS (ref 89–137)
ANION GAP SERPL CALCULATED.3IONS-SCNC: 13 MMOL/L (ref 5–15)
BASE DEFICIT: ABNORMAL
BASE EXCESS BLDV CALC-SCNC: ABNORMAL MMOL/L
BASOPHILS # BLD AUTO: 0 10*3/MM3 (ref 0–0.2)
BASOPHILS NFR BLD AUTO: 0.4 % (ref 0–1.5)
BUN SERPL-MCNC: 17 MG/DL (ref 6–20)
BUN/CREAT SERPL: 17 (ref 7–25)
CA-I BLDA-SCNC: 1.22 MMOL/L (ref 1.12–1.32)
CALCIUM SPEC-SCNC: 9 MG/DL (ref 8.6–10.5)
CHLORIDE SERPL-SCNC: 102 MMOL/L (ref 98–107)
CO2 CONTENT VENOUS: ABNORMAL
CO2 SERPL-SCNC: 25 MMOL/L (ref 22–29)
CREAT SERPL-MCNC: 1 MG/DL (ref 0.76–1.27)
DEPRECATED RDW RBC AUTO: 43.8 FL (ref 37–54)
EGFRCR SERPLBLD CKD-EPI 2021: 86.7 ML/MIN/1.73
EOSINOPHIL # BLD AUTO: 0.1 10*3/MM3 (ref 0–0.4)
EOSINOPHIL NFR BLD AUTO: 0.6 % (ref 0.3–6.2)
ERYTHROCYTE [DISTWIDTH] IN BLOOD BY AUTOMATED COUNT: 13.3 % (ref 12.3–15.4)
GLUCOSE BLDC GLUCOMTR-MCNC: 118 MG/DL (ref 70–105)
GLUCOSE SERPL-MCNC: 109 MG/DL (ref 65–99)
HCO3 BLDV-SCNC: 27.5 MMOL/L (ref 23–28)
HCT VFR BLD AUTO: 47 % (ref 37.5–51)
HCT VFR BLDA CALC: 47 % (ref 38–51)
HGB BLD-MCNC: 15.6 G/DL (ref 13–17.7)
HGB BLDA-MCNC: 16 G/DL (ref 12–17)
LYMPHOCYTES # BLD AUTO: 1.4 10*3/MM3 (ref 0.7–3.1)
LYMPHOCYTES NFR BLD AUTO: 13 % (ref 19.6–45.3)
MAGNESIUM SERPL-MCNC: 2 MG/DL (ref 1.6–2.6)
MCH RBC QN AUTO: 29.8 PG (ref 26.6–33)
MCHC RBC AUTO-ENTMCNC: 33.2 G/DL (ref 31.5–35.7)
MCV RBC AUTO: 89.8 FL (ref 79–97)
MONOCYTES # BLD AUTO: 1.5 10*3/MM3 (ref 0.1–0.9)
MONOCYTES NFR BLD AUTO: 13.6 % (ref 5–12)
NEUTROPHILS NFR BLD AUTO: 7.8 10*3/MM3 (ref 1.7–7)
NEUTROPHILS NFR BLD AUTO: 72.4 % (ref 42.7–76)
NRBC BLD AUTO-RTO: 0.1 /100 WBC (ref 0–0.2)
NT-PROBNP SERPL-MCNC: 1503 PG/ML (ref 0–900)
PCO2 BLDV: 42.8 MM HG (ref 41–51)
PH BLDV: 7.42 PH UNITS (ref 7.31–7.41)
PLATELET # BLD AUTO: 170 10*3/MM3 (ref 140–450)
PMV BLD AUTO: 10.2 FL (ref 6–12)
PO2 BLDV: 36 MM HG (ref 35–42)
POTASSIUM BLDA-SCNC: 3.8 MMOL/L (ref 3.5–4.9)
POTASSIUM SERPL-SCNC: 3.4 MMOL/L (ref 3.5–5.2)
RBC # BLD AUTO: 5.24 10*6/MM3 (ref 4.14–5.8)
SAO2 % BLDCOV: 29 % (ref 45–75)
SODIUM BLD-SCNC: 138 MMOL/L (ref 138–146)
SODIUM SERPL-SCNC: 140 MMOL/L (ref 136–145)
TROPONIN T SERPL HS-MCNC: 19 NG/L
URATE SERPL-MCNC: 4.7 MG/DL (ref 3.4–7)
WBC NRBC COR # BLD AUTO: 10.8 10*3/MM3 (ref 3.4–10.8)

## 2024-02-04 PROCEDURE — 84132 ASSAY OF SERUM POTASSIUM: CPT

## 2024-02-04 PROCEDURE — G0378 HOSPITAL OBSERVATION PER HR: HCPCS

## 2024-02-04 PROCEDURE — 83735 ASSAY OF MAGNESIUM: CPT | Performed by: INTERNAL MEDICINE

## 2024-02-04 PROCEDURE — 93460 R&L HRT ART/VENTRICLE ANGIO: CPT | Performed by: INTERNAL MEDICINE

## 2024-02-04 PROCEDURE — 25010000002 FUROSEMIDE PER 20 MG: Performed by: INTERNAL MEDICINE

## 2024-02-04 PROCEDURE — 82330 ASSAY OF CALCIUM: CPT

## 2024-02-04 PROCEDURE — 82803 BLOOD GASES ANY COMBINATION: CPT

## 2024-02-04 PROCEDURE — 92978 ENDOLUMINL IVUS OCT C 1ST: CPT | Performed by: INTERNAL MEDICINE

## 2024-02-04 PROCEDURE — C1769 GUIDE WIRE: HCPCS | Performed by: INTERNAL MEDICINE

## 2024-02-04 PROCEDURE — 99233 SBSQ HOSP IP/OBS HIGH 50: CPT | Performed by: INTERNAL MEDICINE

## 2024-02-04 PROCEDURE — C1725 CATH, TRANSLUMIN NON-LASER: HCPCS | Performed by: INTERNAL MEDICINE

## 2024-02-04 PROCEDURE — 027034Z DILATION OF CORONARY ARTERY, ONE ARTERY WITH DRUG-ELUTING INTRALUMINAL DEVICE, PERCUTANEOUS APPROACH: ICD-10-PCS | Performed by: INTERNAL MEDICINE

## 2024-02-04 PROCEDURE — 92928 PRQ TCAT PLMT NTRAC ST 1 LES: CPT | Performed by: INTERNAL MEDICINE

## 2024-02-04 PROCEDURE — 84295 ASSAY OF SERUM SODIUM: CPT

## 2024-02-04 PROCEDURE — 85014 HEMATOCRIT: CPT

## 2024-02-04 PROCEDURE — 25010000002 HEPARIN (PORCINE) PER 1000 UNITS: Performed by: INTERNAL MEDICINE

## 2024-02-04 PROCEDURE — 84484 ASSAY OF TROPONIN QUANT: CPT | Performed by: INTERNAL MEDICINE

## 2024-02-04 PROCEDURE — 25010000002 MIDAZOLAM PER 1 MG: Performed by: INTERNAL MEDICINE

## 2024-02-04 PROCEDURE — 85025 COMPLETE CBC W/AUTO DIFF WBC: CPT | Performed by: INTERNAL MEDICINE

## 2024-02-04 PROCEDURE — 99153 MOD SED SAME PHYS/QHP EA: CPT | Performed by: INTERNAL MEDICINE

## 2024-02-04 PROCEDURE — 82947 ASSAY GLUCOSE BLOOD QUANT: CPT

## 2024-02-04 PROCEDURE — C1753 CATH, INTRAVAS ULTRASOUND: HCPCS | Performed by: INTERNAL MEDICINE

## 2024-02-04 PROCEDURE — B240ZZ3 ULTRASONOGRAPHY OF SINGLE CORONARY ARTERY, INTRAVASCULAR: ICD-10-PCS | Performed by: INTERNAL MEDICINE

## 2024-02-04 PROCEDURE — 99152 MOD SED SAME PHYS/QHP 5/>YRS: CPT | Performed by: INTERNAL MEDICINE

## 2024-02-04 PROCEDURE — 80048 BASIC METABOLIC PNL TOTAL CA: CPT | Performed by: INTERNAL MEDICINE

## 2024-02-04 PROCEDURE — 4A023N8 MEASUREMENT OF CARDIAC SAMPLING AND PRESSURE, BILATERAL, PERCUTANEOUS APPROACH: ICD-10-PCS | Performed by: INTERNAL MEDICINE

## 2024-02-04 PROCEDURE — B2151ZZ FLUOROSCOPY OF LEFT HEART USING LOW OSMOLAR CONTRAST: ICD-10-PCS | Performed by: INTERNAL MEDICINE

## 2024-02-04 PROCEDURE — 83880 ASSAY OF NATRIURETIC PEPTIDE: CPT | Performed by: INTERNAL MEDICINE

## 2024-02-04 PROCEDURE — C1894 INTRO/SHEATH, NON-LASER: HCPCS | Performed by: INTERNAL MEDICINE

## 2024-02-04 PROCEDURE — C9600 PERC DRUG-EL COR STENT SING: HCPCS | Performed by: INTERNAL MEDICINE

## 2024-02-04 PROCEDURE — 25010000002 NICARDIPINE 2.5 MG/ML SOLUTION: Performed by: INTERNAL MEDICINE

## 2024-02-04 PROCEDURE — 25510000001 IOPAMIDOL PER 1 ML: Performed by: INTERNAL MEDICINE

## 2024-02-04 PROCEDURE — 25010000002 FENTANYL CITRATE (PF) 100 MCG/2ML SOLUTION: Performed by: INTERNAL MEDICINE

## 2024-02-04 PROCEDURE — C1751 CATH, INF, PER/CENT/MIDLINE: HCPCS | Performed by: INTERNAL MEDICINE

## 2024-02-04 PROCEDURE — 84550 ASSAY OF BLOOD/URIC ACID: CPT | Performed by: INTERNAL MEDICINE

## 2024-02-04 PROCEDURE — 25010000002 LIDOCAINE 1 % SOLUTION: Performed by: INTERNAL MEDICINE

## 2024-02-04 PROCEDURE — 73600 X-RAY EXAM OF ANKLE: CPT

## 2024-02-04 PROCEDURE — C1874 STENT, COATED/COV W/DEL SYS: HCPCS | Performed by: INTERNAL MEDICINE

## 2024-02-04 PROCEDURE — 85347 COAGULATION TIME ACTIVATED: CPT

## 2024-02-04 PROCEDURE — C1887 CATHETER, GUIDING: HCPCS | Performed by: INTERNAL MEDICINE

## 2024-02-04 PROCEDURE — 25810000003 SODIUM CHLORIDE 0.9 % SOLUTION: Performed by: INTERNAL MEDICINE

## 2024-02-04 DEVICE — IMPLANTABLE DEVICE: Type: IMPLANTABLE DEVICE | Site: CORONARY | Status: FUNCTIONAL

## 2024-02-04 RX ORDER — LIDOCAINE HYDROCHLORIDE 10 MG/ML
INJECTION, SOLUTION INFILTRATION; PERINEURAL
Status: DISCONTINUED | OUTPATIENT
Start: 2024-02-04 | End: 2024-02-04 | Stop reason: HOSPADM

## 2024-02-04 RX ORDER — DIPHENHYDRAMINE HCL 25 MG
25 CAPSULE ORAL EVERY 6 HOURS PRN
Status: DISCONTINUED | OUTPATIENT
Start: 2024-02-04 | End: 2024-02-05 | Stop reason: HOSPADM

## 2024-02-04 RX ORDER — HEPARIN SODIUM 1000 [USP'U]/ML
INJECTION, SOLUTION INTRAVENOUS; SUBCUTANEOUS
Status: DISCONTINUED | OUTPATIENT
Start: 2024-02-04 | End: 2024-02-04 | Stop reason: HOSPADM

## 2024-02-04 RX ORDER — POTASSIUM CHLORIDE 20 MEQ/1
40 TABLET, EXTENDED RELEASE ORAL ONCE
Status: COMPLETED | OUTPATIENT
Start: 2024-02-04 | End: 2024-02-04

## 2024-02-04 RX ORDER — ONDANSETRON 4 MG/1
4 TABLET, ORALLY DISINTEGRATING ORAL EVERY 6 HOURS PRN
Status: DISCONTINUED | OUTPATIENT
Start: 2024-02-04 | End: 2024-02-05 | Stop reason: HOSPADM

## 2024-02-04 RX ORDER — ASPIRIN 325 MG
TABLET ORAL
Status: DISCONTINUED | OUTPATIENT
Start: 2024-02-04 | End: 2024-02-04 | Stop reason: HOSPADM

## 2024-02-04 RX ORDER — COLCHICINE 0.6 MG/1
0.6 TABLET ORAL DAILY
Status: DISCONTINUED | OUTPATIENT
Start: 2024-02-04 | End: 2024-02-05 | Stop reason: HOSPADM

## 2024-02-04 RX ORDER — ACETAMINOPHEN 325 MG/1
650 TABLET ORAL EVERY 4 HOURS PRN
Status: DISCONTINUED | OUTPATIENT
Start: 2024-02-04 | End: 2024-02-04

## 2024-02-04 RX ORDER — NITROGLYCERIN 0.4 MG/1
0.4 TABLET SUBLINGUAL
Status: DISCONTINUED | OUTPATIENT
Start: 2024-02-04 | End: 2024-02-05 | Stop reason: HOSPADM

## 2024-02-04 RX ORDER — MIDAZOLAM HYDROCHLORIDE 1 MG/ML
INJECTION INTRAMUSCULAR; INTRAVENOUS
Status: DISCONTINUED | OUTPATIENT
Start: 2024-02-04 | End: 2024-02-04 | Stop reason: HOSPADM

## 2024-02-04 RX ORDER — FUROSEMIDE 40 MG/1
40 TABLET ORAL DAILY
Status: DISCONTINUED | OUTPATIENT
Start: 2024-02-05 | End: 2024-02-05 | Stop reason: HOSPADM

## 2024-02-04 RX ORDER — SODIUM CHLORIDE 9 MG/ML
INJECTION, SOLUTION INTRAVENOUS
Status: DISCONTINUED | OUTPATIENT
Start: 2024-02-04 | End: 2024-02-04

## 2024-02-04 RX ORDER — ONDANSETRON 2 MG/ML
4 INJECTION INTRAMUSCULAR; INTRAVENOUS EVERY 6 HOURS PRN
Status: DISCONTINUED | OUTPATIENT
Start: 2024-02-04 | End: 2024-02-05 | Stop reason: HOSPADM

## 2024-02-04 RX ORDER — FENTANYL CITRATE 50 UG/ML
INJECTION, SOLUTION INTRAMUSCULAR; INTRAVENOUS
Status: DISCONTINUED | OUTPATIENT
Start: 2024-02-04 | End: 2024-02-04 | Stop reason: HOSPADM

## 2024-02-04 RX ORDER — NICARDIPINE HYDROCHLORIDE 2.5 MG/ML
INJECTION INTRAVENOUS
Status: DISCONTINUED | OUTPATIENT
Start: 2024-02-04 | End: 2024-02-04 | Stop reason: HOSPADM

## 2024-02-04 RX ORDER — ASPIRIN 81 MG/1
81 TABLET ORAL DAILY
Status: DISCONTINUED | OUTPATIENT
Start: 2024-02-04 | End: 2024-02-05 | Stop reason: HOSPADM

## 2024-02-04 RX ORDER — ROSUVASTATIN CALCIUM 10 MG/1
10 TABLET, COATED ORAL NIGHTLY
Status: DISCONTINUED | OUTPATIENT
Start: 2024-02-04 | End: 2024-02-05 | Stop reason: HOSPADM

## 2024-02-04 RX ADMIN — CARVEDILOL 6.25 MG: 6.25 TABLET, FILM COATED ORAL at 08:06

## 2024-02-04 RX ADMIN — FUROSEMIDE 40 MG: 10 INJECTION, SOLUTION INTRAMUSCULAR; INTRAVENOUS at 06:17

## 2024-02-04 RX ADMIN — CARVEDILOL 6.25 MG: 6.25 TABLET, FILM COATED ORAL at 17:55

## 2024-02-04 RX ADMIN — ALLOPURINOL 100 MG: 100 TABLET ORAL at 08:06

## 2024-02-04 RX ADMIN — COLCHICINE 0.6 MG: 0.6 TABLET, FILM COATED ORAL at 14:56

## 2024-02-04 RX ADMIN — LOSARTAN POTASSIUM 25 MG: 25 TABLET, FILM COATED ORAL at 08:06

## 2024-02-04 RX ADMIN — TICAGRELOR 90 MG: 90 TABLET ORAL at 20:01

## 2024-02-04 RX ADMIN — Medication 10 ML: at 08:06

## 2024-02-04 RX ADMIN — EMPAGLIFLOZIN 10 MG: 10 TABLET, FILM COATED ORAL at 08:33

## 2024-02-04 RX ADMIN — ROSUVASTATIN 10 MG: 10 TABLET, FILM COATED ORAL at 20:01

## 2024-02-04 RX ADMIN — POTASSIUM CHLORIDE 40 MEQ: 1500 TABLET, EXTENDED RELEASE ORAL at 08:44

## 2024-02-04 NOTE — PROGRESS NOTES
Referring Provider: Alberto Miguel MD    Reason for follow-up: New onset HFrEF     Patient Care Team:  Nora Sterling MD as PCP - General (Family Medicine)  Giorgio Kennedy MD as Consulting Physician (Gastroenterology)      SUBJECTIVE  Seen after PCI.  Access site is benign.  Right IJ line can be pulled out.  Currently chest pain-free.     ROS  Review of all systems negative except as indicated.    Since I have last seen, the patient has been without any chest discomfort, shortness of breath, palpitations, dizziness or syncope.  Denies having any headache, abdominal pain, nausea, vomiting, diarrhea, constipation, loss of weight or loss of appetite.  Denies having any excessive bruising, hematuria or blood in the stool.  ROS      Personal History:    Past Medical History:   Diagnosis Date    Ankle fracture     right ankle    Arthritis     Gout    Back injury 07/01/2007    broke    CHF (congestive heart failure) 2/1/2024    Hypertension        Past Surgical History:   Procedure Laterality Date    ARM WOUND REPAIR / CLOSURE Left 2014    infection in bone    COLONOSCOPY N/A 9/26/2019    Procedure: COLONOSCOPY WITH POLYPECTOMY X 2,;  Surgeon: Sukhdev Silva MD;  Location: [facility] OR;  Service: Gastroenterology    MASS EXCISION N/A 9/26/2019    Procedure: Excision of perianal skin tags;  Surgeon: Robert Stinson MD;  Location: [facility] MAIN OR;  Service: General    SKIN TAG REMOVAL         Family History   Problem Relation Age of Onset    No Known Problems Mother     No Known Problems Father     No Known Problems Sister     No Known Problems Brother        Social History     Tobacco Use    Smoking status: Never    Smokeless tobacco: Never   Vaping Use    Vaping Use: Never used   Substance Use Topics    Alcohol use: No    Drug use: No        Home meds:  Prior to Admission medications    Medication Sig Start Date End Date Taking? Authorizing Provider   allopurinol (ZYLOPRIM) 100  "MG tablet Take 1 tablet by mouth Daily. 6/22/19  Yes Provider, MD Gerda   carvedilol (COREG) 6.25 MG tablet Take 1 tablet by mouth 2 (Two) Times a Day With Meals. 2/2/24  Yes Alberto Miguel MD   empagliflozin (JARDIANCE) 10 MG tablet tablet Take 1 tablet by mouth Daily. 2/3/24  Yes Alberto Migeul MD   furosemide (Lasix) 20 MG tablet Take 1 tablet by mouth 2 (Two) Times a Day. 2/2/24  Yes Alberto Miguel MD   lisinopril (PRINIVIL,ZESTRIL) 40 MG tablet Take 1 tablet by mouth Daily. 6/22/19  Yes Provider, MD Gerda   losartan (Cozaar) 25 MG tablet Take 1 tablet by mouth Daily. 2/2/24   Alberto Miguel MD       Allergies:  Patient has no known allergies.    Scheduled Meds:allopurinol, 100 mg, Oral, Daily  carvedilol, 6.25 mg, Oral, BID With Meals  empagliflozin, 10 mg, Oral, Daily  furosemide, 40 mg, Intravenous, Q12H  losartan, 25 mg, Oral, Q24H      Continuous Infusions:nitroglycerin,  mcg/min, Last Rate: Stopped (02/02/24 0900)      PRN Meds:.  acetaminophen **OR** acetaminophen **OR** acetaminophen    senna-docusate sodium **AND** polyethylene glycol **AND** bisacodyl **AND** bisacodyl    melatonin    nitroglycerin    ondansetron    [COMPLETED] Insert Peripheral IV **AND** sodium chloride      OBJECTIVE    Vital Signs  Vitals:    02/03/24 2153 02/04/24 0104 02/04/24 0448 02/04/24 0500   BP: 106/74 118/76 115/69    BP Location: Left arm Left arm Left arm    Patient Position: Lying Lying Lying    Pulse: 88 92 85    Resp: 12 16 20    Temp: 99.7 °F (37.6 °C) 98.8 °F (37.1 °C) (!) 100.6 °F (38.1 °C)    TempSrc: Oral Oral Oral    SpO2: 92% 94% 95%    Weight:    76.6 kg (168 lb 14 oz)   Height:           Flowsheet Rows      Flowsheet Row First Filed Value   Admission Height 170.2 cm (67\") Documented at 02/01/2024 1722   Admission Weight 81.6 kg (180 lb) Documented at 02/01/2024 1722              Intake/Output Summary (Last 24 hours) at 2/4/2024 0724  Last data filed at 2/3/2024 " 1856  Gross per 24 hour   Intake 240 ml   Output 1150 ml   Net -910 ml          Telemetry: Sinus rhythm    Physical Exam:  The patient is alert, oriented and in no distress.  Vital signs as noted above.  Head and neck revealed no carotid bruits or jugular venous distention.  No thyromegaly or lymphadenopathy is present  Lungs clear.  No wheezing.  Breath sounds are normal bilaterally.  Heart normal first and second heart sounds.  No murmur. No precordial rub is present.  No gallop is present.  Abdomen soft and nontender.  No organomegaly is present.  Extremities with good peripheral pulses without any pedal edema.  Skin warm and dry.  Musculoskeletal system is grossly normal.  CNS grossly normal.       Results Review:  I have personally reviewed the results from the time of this admission to 2/4/2024 07:24 EST and agree with these findings:  []  Laboratory  []  Microbiology  []  Radiology  []  EKG/Telemetry   []  Cardiology/Vascular   []  Pathology  []  Old records  []  Other:    Most notable findings include:    Lab Results (last 24 hours)       Procedure Component Value Units Date/Time    Basic Metabolic Panel [[id number]]  (Abnormal) Collected: 02/04/24 0314    Specimen: Blood Updated: 02/04/24 0402     Glucose 109 mg/dL      BUN 17 mg/dL      Creatinine 1.00 mg/dL      Sodium 140 mmol/L      Potassium 3.4 mmol/L      Chloride 102 mmol/L      CO2 25.0 mmol/L      Calcium 9.0 mg/dL      BUN/Creatinine Ratio 17.0     Anion Gap 13.0 mmol/L      eGFR 86.7 mL/min/1.73     Narrative:      GFR Normal >60  Chronic Kidney Disease <60  Kidney Failure <15      CBC & Differential [[id number]]  (Abnormal) Collected: 02/04/24 0314    Specimen: Blood Updated: 02/04/24 0338    Narrative:      The following orders were created for panel order CBC & Differential.  Procedure                               Abnormality         Status                     ---------                               -----------         ------                      CBC Auto Differential[[id number]]        Abnormal            Final result                 Please view results for these tests on the individual orders.    CBC Auto Differential [[id number]]  (Abnormal) Collected: 02/04/24 0314    Specimen: Blood Updated: 02/04/24 0338     WBC 10.80 10*3/mm3      RBC 5.24 10*6/mm3      Hemoglobin 15.6 g/dL      Hematocrit 47.0 %      MCV 89.8 fL      MCH 29.8 pg      MCHC 33.2 g/dL      RDW 13.3 %      RDW-SD 43.8 fl      MPV 10.2 fL      Platelets 170 10*3/mm3      Neutrophil % 72.4 %      Lymphocyte % 13.0 %      Monocyte % 13.6 %      Eosinophil % 0.6 %      Basophil % 0.4 %      Neutrophils, Absolute 7.80 10*3/mm3      Lymphocytes, Absolute 1.40 10*3/mm3      Monocytes, Absolute 1.50 10*3/mm3      Eosinophils, Absolute 0.10 10*3/mm3      Basophils, Absolute 0.00 10*3/mm3      nRBC 0.1 /100 WBC             Imaging Results (Last 24 Hours)       ** No results found for the last 24 hours. **            LAB RESULTS (LAST 7 DAYS)    CBC  Results from last 7 days   Lab Units 02/04/24 0314 02/03/24 0202 02/02/24 0447 02/01/24 2232 02/01/24  1753   WBC 10*3/mm3 10.80 10.30 9.40 9.80 11.80*   RBC 10*6/mm3 5.24 5.20 4.77 4.84 4.84   HEMOGLOBIN g/dL 15.6 15.4 14.4 14.3 15.1   HEMATOCRIT % 47.0 47.3 43.9 44.2 45.3   MCV fL 89.8 91.0 91.9 91.3 93.5   PLATELETS 10*3/mm3 170 164 154 176 173       BMP  Results from last 7 days   Lab Units 02/04/24 0314 02/03/24 0202 02/02/24  0918 02/01/24 2232 02/01/24  1753   SODIUM mmol/L 140 144  --  139 142   POTASSIUM mmol/L 3.4* 3.8  --  3.8 4.7   CHLORIDE mmol/L 102 104  --  104 108*   CO2 mmol/L 25.0 26.0  --  24.0 22.0   BUN mg/dL 17 19  --  16 18   CREATININE mg/dL 1.00 1.06  --  0.85 0.89   GLUCOSE mg/dL 109* 93  --  107* 91   MAGNESIUM mg/dL  --   --  1.9  --   --        CMP   Results from last 7 days   Lab Units 02/04/24  0314 02/03/24  0202 02/01/24  2232 02/01/24  1753   SODIUM mmol/L 140 144 139 142   POTASSIUM mmol/L 3.4* 3.8 3.8 4.7    CHLORIDE mmol/L 102 104 104 108*   CO2 mmol/L 25.0 26.0 24.0 22.0   BUN mg/dL 17 19 16 18   CREATININE mg/dL 1.00 1.06 0.85 0.89   GLUCOSE mg/dL 109* 93 107* 91   ALBUMIN g/dL  --   --   --  3.8   BILIRUBIN mg/dL  --   --   --  0.7   ALK PHOS U/L  --   --   --  84   AST (SGOT) U/L  --   --   --  42*   ALT (SGPT) U/L  --   --   --  28       BNP        TROPONIN  Results from last 7 days   Lab Units 02/01/24 2017   HSTROP T ng/L 27*       CoAg        Creatinine Clearance  Estimated Creatinine Clearance: 86.2 mL/min (by C-G formula based on SCr of 1 mg/dL).    ABG        Radiology  No radiology results for the last day      EKG  I personally viewed and interpreted the patient's EKG/Telemetry data:  ECG 12 Lead Chest Pain   Final Result   HEART RATE= 111  bpm   RR Interval= 540  ms   RI Interval= 165  ms   P Horizontal Axis= -4  deg   P Front Axis= 57  deg   QRSD Interval= 79  ms   QT Interval= 342  ms   QTcB= 465  ms   QRS Axis= 21  deg   T Wave Axis= 107  deg   - ABNORMAL ECG -   Sinus tachycardia   Paired ventricular premature complexes   Probable left atrial enlargement   Abnrm R prog, consider ASMI or lead placement   Nonspecific T abnormalities, lateral leads   When compared with ECG of 19-Sep-2019 15:25:50,   Significant rate increase   Significant repolarization change   Electronically Signed By: Hans Locke (Con) 02-Feb-2024 06:22:51   Date and Time of Study: 2024-02-01 17:08:30      SCANNED - TELEMETRY     Final Result      SCANNED - TELEMETRY     Final Result      SCANNED - TELEMETRY     Final Result      SCANNED - TELEMETRY     Final Result      SCANNED - TELEMETRY     Final Result      SCANNED - TELEMETRY     Final Result      SCANNED - TELEMETRY     Final Result            Echocardiogram:    Results for orders placed during the hospital encounter of 02/01/24    Adult Transthoracic Echo Complete W/ Cont if Necessary Per Protocol    Interpretation Summary    Left ventricular systolic function is  moderately decreased. Calculated left ventricular EF = 29% Left ventricular ejection fraction appears to be 26 - 30%.    The left ventricular cavity is severely dilated.    Left ventricular diastolic function is consistent with (grade II w/high LAP) pseudonormalization.    The left atrial cavity is dilated.    Estimated right ventricular systolic pressure from tricuspid regurgitation is normal (<35 mmHg).    No significant valvular abnormalities noted.        Stress Test:  Results for orders placed during the hospital encounter of 02/01/24    Stress Test With Myocardial Perfusion One Day    Interpretation Summary    Findings consistent with a normal ECG stress test.    Left ventricular ejection fraction is moderately reduced (Calculated EF = 27%).    Myocardial perfusion imaging indicates a large-sized, severe area of ischemia located in the inferior wall.    Impressions are consistent with a high risk study.         Cardiac Catheterization:  No results found for this or any previous visit.         Other:         ASSESSMENT & PLAN:    Principal Problem:    CHF (congestive heart failure)  Active Problems:    Abnormal nuclear stress test    Vitamin D deficiency    Hypertension    Arthritis    Acute HFrEF (heart failure with reduced ejection fraction)    Acute HFrEF  Echocardiogram shows EF of 29% with grade 2 diastolic dysfunction  Elevated proBNP, minimally elevated high-sensitivity troponin.  IV diuretics will be stopped.  Wedge pressure and LVEDP are normal   Start oral Lasix tomorrow  Continue losartan, beta-blocker and Jardiance.  Plan will be to repeat echocardiogram in 3 months after maximally tolerated GDMT has been given.  Plan explained to the patient and he is in agreement  Cardiomyopathy is out of proportion to coronary disease.    Coronary artery disease  Status post PCI with VELASQUEZ placement to RCA  Started on aspirin and Brilinta  Start statin  Medical management for nonobstructive disease in the LAD      Hypertension  Well-controlled on losartan and beta-blocker     Hyperlipidemia  LDL 38, HDL 45, triglyceride 93 and total cholesterol 101  Start statin due to coronary artery disease     Overweight  BMI is 26  He weighs 166 pounds  Lifestyle modifications recommended to the patient.          Yaw Zavaleta MD  02/04/24  07:24 EST

## 2024-02-04 NOTE — PROGRESS NOTES
[facility] MEDICINE SERVICE  DAILY PROGRESS NOTE    NAME: John Morales  : 1965  MRN: [id number]      LOS: 1 day     PROVIDER OF SERVICE: Alberto Miguel MD    Chief Complaint: CHF (congestive heart failure)    Subjective:     Interval History:  History taken from: patient  Patient Complaints:  shortness of breath     History of Present Illness: John Morales is a 58 y.o. male with a previous medical history of HTN who presented to [facility] on 2024 with  shortness of breath worse with exertion and cold weather and bilateral lower extremity swelling for the last three months.  He saw his PCP today who was concerned for heart failure and sent him to the ED for further evaluation.  The patient reports that he is supposed to take Amlodipine and Lisinopril but stopped months ago.  He denies chest pain or palpitations.      In the ED, BNP is 2885, Troponin 19, 27. Chest xray shows pulmonary edema versus multifocal pneumonia.  He is afebrile,  hypertensive at 146/96 and tachycardic at 113 on exam.   EKG reviewed, sinus tachycardia with PVCs and probably left atrial enlargement, .  He was started on a Tridil drip and given Lasix, 40mg IV in the ED.  Hospitalist was consulted for further care and management.     24 seen in bed NAD, no new complaints, anxious to go home, DW RN  2/3/2024 patient seen and examined in bed no acute distress, underwent stress test and echo.  Pending results, discussed with RN.  Will discharge home if stress test and echo are normal.  24 patient seen and examined in bed no acute distress, no complaints, denies chest pain.  Vital signs stable, for cardiac cath today.  Patient is complaining of right ankle pain.  Per patient he was treated with Uloric in the past check uric acid.  Will obtain ankle x-ray.    Review of Systems  12 point ROS reviewed and negative except as mentioned above    Objective:     Vital Signs  Temp:  [97.6 °F (36.4  °C)-100.6 °F (38.1 °C)] 97.6 °F (36.4 °C)  Heart Rate:  [85-92] 90  Resp:  [12-20] 16  BP: (102-124)/(66-78) 124/78   Body mass index is 26.45 kg/m².      Physical Exam  Vitals and nursing note reviewed.   Constitutional:       Appearance: Normal appearance.   HENT:      Head: Normocephalic and atraumatic.      Nose: Nose normal.      Mouth/Throat:      Mouth: Mucous membranes are moist.   Eyes:      Extraocular Movements: Extraocular movements intact.      Pupils: Pupils are equal, round, and reactive to light.   Cardiovascular:      Rate and Rhythm: Regular rhythm. Tachycardia present. Occasional Extrasystoles are present.     Pulses: Normal pulses.      Heart sounds: Normal heart sounds.   Pulmonary:      Effort: Pulmonary effort is normal.      Breath sounds: Examination of the right-lower field reveals decreased breath sounds. Examination of the left-lower field reveals decreased breath sounds. Decreased breath sounds present.   Abdominal:      General: Bowel sounds are normal.      Palpations: Abdomen is soft.   Musculoskeletal:         General: Normal range of motion.      Cervical back: Normal range of motion.      Right lower le+ Edema present.      Left lower le+ Edema present.   Skin:     General: Skin is warm and dry.   Neurological:      General: No focal deficit present.      Mental Status: He is alert and oriented to person, place, and time. Mental status is at baseline.   Psychiatric:         Mood and Affect: Mood normal.         Behavior: Behavior normal. Behavior is agitated. Behavior is cooperative.      Scheduled Meds   allopurinol, 100 mg, Oral, Daily  carvedilol, 6.25 mg, Oral, BID With Meals  empagliflozin, 10 mg, Oral, Daily  furosemide, 40 mg, Intravenous, Q12H  losartan, 25 mg, Oral, Q24H       PRN Meds     acetaminophen **OR** acetaminophen **OR** acetaminophen    senna-docusate sodium **AND** polyethylene glycol **AND** bisacodyl **AND** bisacodyl    melatonin    nitroglycerin     ondansetron    [COMPLETED] Insert Peripheral IV **AND** sodium chloride   Infusions  nitroglycerin,  mcg/min, Last Rate: Stopped (02/02/24 0900)          Diagnostic Data    Results from last 7 days   Lab Units 02/04/24  0314 02/01/24  2232 02/01/24  1753   WBC 10*3/mm3 10.80   < > 11.80*   HEMOGLOBIN g/dL 15.6   < > 15.1   HEMATOCRIT % 47.0   < > 45.3   PLATELETS 10*3/mm3 170   < > 173   GLUCOSE mg/dL 109*   < > 91   CREATININE mg/dL 1.00   < > 0.89   BUN mg/dL 17   < > 18   SODIUM mmol/L 140   < > 142   POTASSIUM mmol/L 3.4*   < > 4.7   AST (SGOT) U/L  --   --  42*   ALT (SGPT) U/L  --   --  28   ALK PHOS U/L  --   --  84   BILIRUBIN mg/dL  --   --  0.7   ANION GAP mmol/L 13.0   < > 12.0    < > = values in this interval not displayed.       No radiology results for the last day      I reviewed the patient's new clinical results.    Assessment/Plan:     Active and Resolved Problems  Active Hospital Problems    Diagnosis  POA    **CHF (congestive heart failure) [I50.9]  Yes    Acute HFrEF (heart failure with reduced ejection fraction) [I50.21]  Yes    Abnormal nuclear stress test [R94.39]  Unknown    Hypertension [I10]  Yes    Arthritis [M19.90]  Yes    Vitamin D deficiency [E55.9]  Yes      Resolved Hospital Problems   No resolved problems to display.         Congestive Heart Failure-New Diagnosis  Essential Hypertension-Uncontrolled due to non compliance with medication  -BNP 2885  -Initial Troponin 19, 27  -EKG reviewed  -Chest xray reviewed, pulmonary edema noted  -Tridil drip started in ED for BP control, continue and wean as tolerated  -Restart home BP medications when verified  -Lasix 40mg IV given in ED, continue Q12H  -ECHO and stress test ordered  -Continuous cardiac monitoring  -Heart Failure Pathway initiated  Coreg 3.125mg BID  Jardiance 10mg daily  Daily weights  Strict I&O  Heart Failure Navigator Consult  -Cardiology consulted       DVT prophylaxis:  Mechanical DVT prophylaxis orders are  present.         Code status is   Code Status and Medical Interventions:   Ordered at: 02/01/24 2036     Code Status (Patient has no pulse and is not breathing):    CPR (Attempt to Resuscitate)     Medical Interventions (Patient has pulse or is breathing):    Full Support       Plan for disposition:home in 1 days    Time: 30 minutes    Signature: Electronically signed by Alberto Miguel MD, 02/04/24, 10:53 EST.  [facility] Hospitalist Team

## 2024-02-04 NOTE — CASE MANAGEMENT/SOCIAL WORK
Continued Stay Note  DENNISE Harper     Patient Name: John Morales  MRN: [id number]  Today's Date: 2/4/2024    Admit Date: 2/1/2024    Plan: Anticipate routine home alone. Jardiance copay $10/month per M2B.   Discharge Plan       Row Name 02/04/24 0751       Plan    Plan Comments DC barriers: Cardiology following. heart cath today, IV lasix

## 2024-02-05 ENCOUNTER — READMISSION MANAGEMENT (OUTPATIENT)
Dept: CALL CENTER | Facility: HOSPITAL | Age: 59
End: 2024-02-05
Payer: COMMERCIAL

## 2024-02-05 VITALS
WEIGHT: 158.07 LBS | RESPIRATION RATE: 16 BRPM | SYSTOLIC BLOOD PRESSURE: 131 MMHG | OXYGEN SATURATION: 95 % | TEMPERATURE: 97.5 F | BODY MASS INDEX: 24.81 KG/M2 | HEIGHT: 67 IN | HEART RATE: 92 BPM | DIASTOLIC BLOOD PRESSURE: 89 MMHG

## 2024-02-05 PROBLEM — I25.10 CAD (CORONARY ARTERY DISEASE), NATIVE CORONARY ARTERY: Status: ACTIVE | Noted: 2024-02-05

## 2024-02-05 PROBLEM — R94.39 ABNORMAL NUCLEAR STRESS TEST: Status: RESOLVED | Noted: 2024-02-01 | Resolved: 2024-02-05

## 2024-02-05 PROBLEM — I42.8 NICM (NONISCHEMIC CARDIOMYOPATHY): Status: ACTIVE | Noted: 2024-02-05

## 2024-02-05 PROBLEM — E55.9 VITAMIN D DEFICIENCY: Chronic | Status: ACTIVE | Noted: 2019-07-13

## 2024-02-05 PROBLEM — Z95.5 S/P DRUG ELUTING CORONARY STENT PLACEMENT: Status: ACTIVE | Noted: 2024-02-04

## 2024-02-05 PROBLEM — I25.110 CORONARY ARTERY DISEASE INVOLVING NATIVE CORONARY ARTERY OF NATIVE HEART WITH UNSTABLE ANGINA PECTORIS: Status: ACTIVE | Noted: 2024-02-05

## 2024-02-05 LAB
ANION GAP SERPL CALCULATED.3IONS-SCNC: 12 MMOL/L (ref 5–15)
BASE DEFICIT: ABNORMAL
BASE EXCESS BLDV CALC-SCNC: ABNORMAL MMOL/L
BASOPHILS # BLD AUTO: 0 10*3/MM3 (ref 0–0.2)
BASOPHILS NFR BLD AUTO: 0.2 % (ref 0–1.5)
BUN SERPL-MCNC: 20 MG/DL (ref 6–20)
BUN/CREAT SERPL: 21.1 (ref 7–25)
CA-I BLDA-SCNC: 1.19 MMOL/L (ref 1.12–1.32)
CALCIUM SPEC-SCNC: 9.2 MG/DL (ref 8.6–10.5)
CHLORIDE SERPL-SCNC: 96 MMOL/L (ref 98–107)
CO2 CONTENT VENOUS: ABNORMAL
CO2 SERPL-SCNC: 24 MMOL/L (ref 22–29)
CREAT SERPL-MCNC: 0.95 MG/DL (ref 0.76–1.27)
DEPRECATED RDW RBC AUTO: 42 FL (ref 37–54)
EGFRCR SERPLBLD CKD-EPI 2021: 92.2 ML/MIN/1.73
EOSINOPHIL # BLD AUTO: 0 10*3/MM3 (ref 0–0.4)
EOSINOPHIL NFR BLD AUTO: 0.3 % (ref 0.3–6.2)
ERYTHROCYTE [DISTWIDTH] IN BLOOD BY AUTOMATED COUNT: 13.2 % (ref 12.3–15.4)
GEN 5 2HR TROPONIN T REFLEX: 21 NG/L
GLUCOSE BLDC GLUCOMTR-MCNC: 119 MG/DL (ref 70–105)
GLUCOSE SERPL-MCNC: 98 MG/DL (ref 65–99)
HCO3 BLDV-SCNC: 25.2 MMOL/L (ref 23–28)
HCT VFR BLD AUTO: 45.9 % (ref 37.5–51)
HCT VFR BLDA CALC: 46 % (ref 38–51)
HGB BLD-MCNC: 14.8 G/DL (ref 13–17.7)
HGB BLDA-MCNC: 15.6 G/DL (ref 12–17)
LYMPHOCYTES # BLD AUTO: 1 10*3/MM3 (ref 0.7–3.1)
LYMPHOCYTES NFR BLD AUTO: 8.2 % (ref 19.6–45.3)
MCH RBC QN AUTO: 29.6 PG (ref 26.6–33)
MCHC RBC AUTO-ENTMCNC: 32.3 G/DL (ref 31.5–35.7)
MCV RBC AUTO: 91.5 FL (ref 79–97)
MONOCYTES # BLD AUTO: 1.8 10*3/MM3 (ref 0.1–0.9)
MONOCYTES NFR BLD AUTO: 14.3 % (ref 5–12)
NEUTROPHILS NFR BLD AUTO: 77 % (ref 42.7–76)
NEUTROPHILS NFR BLD AUTO: 9.6 10*3/MM3 (ref 1.7–7)
NRBC BLD AUTO-RTO: 0 /100 WBC (ref 0–0.2)
PCO2 BLDV: 36.1 MM HG (ref 41–51)
PH BLDV: 7.45 PH UNITS (ref 7.31–7.41)
PLATELET # BLD AUTO: 191 10*3/MM3 (ref 140–450)
PMV BLD AUTO: 10 FL (ref 6–12)
PO2 BLDV: 90 MM HG (ref 35–42)
POTASSIUM BLDA-SCNC: 3.8 MMOL/L (ref 3.5–4.9)
POTASSIUM SERPL-SCNC: 3.6 MMOL/L (ref 3.5–5.2)
RBC # BLD AUTO: 5.02 10*6/MM3 (ref 4.14–5.8)
SAO2 % BLDCOV: 26 % (ref 45–75)
SODIUM BLD-SCNC: 138 MMOL/L (ref 138–146)
SODIUM SERPL-SCNC: 132 MMOL/L (ref 136–145)
TROPONIN T DELTA: 2 NG/L
WBC NRBC COR # BLD AUTO: 12.4 10*3/MM3 (ref 3.4–10.8)

## 2024-02-05 PROCEDURE — 80048 BASIC METABOLIC PNL TOTAL CA: CPT | Performed by: INTERNAL MEDICINE

## 2024-02-05 PROCEDURE — 99233 SBSQ HOSP IP/OBS HIGH 50: CPT | Performed by: INTERNAL MEDICINE

## 2024-02-05 PROCEDURE — 84484 ASSAY OF TROPONIN QUANT: CPT | Performed by: INTERNAL MEDICINE

## 2024-02-05 PROCEDURE — 85025 COMPLETE CBC W/AUTO DIFF WBC: CPT | Performed by: INTERNAL MEDICINE

## 2024-02-05 RX ORDER — COLCHICINE 0.6 MG/1
0.6 TABLET ORAL DAILY
Qty: 30 TABLET | Refills: 0 | Status: SHIPPED | OUTPATIENT
Start: 2024-02-06

## 2024-02-05 RX ORDER — POTASSIUM CHLORIDE 750 MG/1
10 TABLET, FILM COATED, EXTENDED RELEASE ORAL 2 TIMES DAILY
Qty: 60 TABLET | Refills: 0 | Status: SHIPPED | OUTPATIENT
Start: 2024-02-05

## 2024-02-05 RX ORDER — ROSUVASTATIN CALCIUM 10 MG/1
10 TABLET, COATED ORAL NIGHTLY
Qty: 90 TABLET | Refills: 0 | Status: SHIPPED | OUTPATIENT
Start: 2024-02-05

## 2024-02-05 RX ORDER — ASPIRIN 81 MG/1
81 TABLET ORAL DAILY
Qty: 30 TABLET | Refills: 0 | Status: SHIPPED | OUTPATIENT
Start: 2024-02-06

## 2024-02-05 RX ORDER — NITROGLYCERIN 0.4 MG/1
0.4 TABLET SUBLINGUAL
Qty: 25 TABLET | Refills: 12 | Status: SHIPPED | OUTPATIENT
Start: 2024-02-05

## 2024-02-05 RX ADMIN — COLCHICINE 0.6 MG: 0.6 TABLET, FILM COATED ORAL at 08:14

## 2024-02-05 RX ADMIN — CARVEDILOL 6.25 MG: 6.25 TABLET, FILM COATED ORAL at 08:15

## 2024-02-05 RX ADMIN — ALLOPURINOL 100 MG: 100 TABLET ORAL at 08:15

## 2024-02-05 RX ADMIN — FUROSEMIDE 40 MG: 40 TABLET ORAL at 08:14

## 2024-02-05 RX ADMIN — LOSARTAN POTASSIUM 25 MG: 25 TABLET, FILM COATED ORAL at 08:17

## 2024-02-05 RX ADMIN — Medication 10 ML: at 08:17

## 2024-02-05 RX ADMIN — EMPAGLIFLOZIN 10 MG: 10 TABLET, FILM COATED ORAL at 08:15

## 2024-02-05 RX ADMIN — ASPIRIN 81 MG: 81 TABLET, COATED ORAL at 08:18

## 2024-02-05 RX ADMIN — TICAGRELOR 90 MG: 90 TABLET ORAL at 08:18

## 2024-02-05 NOTE — PLAN OF CARE
Problem: Adult Inpatient Plan of Care  Goal: Plan of Care Review  Outcome: Met  Flowsheets (Taken 2/5/2024 1032)  Outcome Evaluation: Pt to d/c home.  Pt has meds to beds with meds at bedside.  Verbalized understanding of discharge orders.  IV removed intact. D/c to home.  spoke with Susan ANN about brilinta and she stated pt was good to go.   Goal Outcome Evaluation:              Outcome Evaluation: Pt to d/c home.  Pt has meds to beds with meds at bedside.  Verbalized understanding of discharge orders.  IV removed intact. D/c to home.  spoke with Susan ANN about brilinta and she stated pt was good to go.

## 2024-02-05 NOTE — PROGRESS NOTES
Referring Provider: Alberto Miguel MD    Reason for follow-up: New onset HFrEF     Patient Care Team:  Nora Sterling MD as PCP - General (Family Medicine)  Giorgio Kennedy MD as Consulting Physician (Gastroenterology)      SUBJECTIVE  Wants to go home today.  Not interested in cardiac rehab     ROS  Review of all systems negative except as indicated.    Since I have last seen, the patient has been without any chest discomfort, shortness of breath, palpitations, dizziness or syncope.  Denies having any headache, abdominal pain, nausea, vomiting, diarrhea, constipation, loss of weight or loss of appetite.  Denies having any excessive bruising, hematuria or blood in the stool.  ROS      Personal History:    Past Medical History:   Diagnosis Date    Ankle fracture     right ankle    Arthritis     Gout    Back injury 07/01/2007    broke    CHF (congestive heart failure) 2/1/2024    Hypertension        Past Surgical History:   Procedure Laterality Date    ARM WOUND REPAIR / CLOSURE Left 2014    infection in bone    COLONOSCOPY N/A 9/26/2019    Procedure: COLONOSCOPY WITH POLYPECTOMY X 2,;  Surgeon: Sukhdev Silva MD;  Location: [facility] MAIN OR;  Service: Gastroenterology    MASS EXCISION N/A 9/26/2019    Procedure: Excision of perianal skin tags;  Surgeon: Robert Stinson MD;  Location: [facility] MAIN OR;  Service: General    SKIN TAG REMOVAL         Family History   Problem Relation Age of Onset    No Known Problems Mother     No Known Problems Father     No Known Problems Sister     No Known Problems Brother        Social History     Tobacco Use    Smoking status: Never    Smokeless tobacco: Never   Vaping Use    Vaping Use: Never used   Substance Use Topics    Alcohol use: No    Drug use: No        Home meds:  Prior to Admission medications    Medication Sig Start Date End Date Taking? Authorizing Provider   allopurinol (ZYLOPRIM) 100 MG tablet Take 1 tablet by mouth Daily.  "6/22/19  Yes Provider, MD Gerda   carvedilol (COREG) 6.25 MG tablet Take 1 tablet by mouth 2 (Two) Times a Day With Meals. 2/2/24  Yes Alberto Miguel MD   empagliflozin (JARDIANCE) 10 MG tablet tablet Take 1 tablet by mouth Daily. 2/3/24  Yes Alberto Miguel MD   furosemide (Lasix) 20 MG tablet Take 1 tablet by mouth 2 (Two) Times a Day. 2/2/24  Yes Alberto Miguel MD   lisinopril (PRINIVIL,ZESTRIL) 40 MG tablet Take 1 tablet by mouth Daily. 6/22/19  Yes ProviderGerda MD   losartan (Cozaar) 25 MG tablet Take 1 tablet by mouth Daily. 2/2/24   Alberto Miguel MD       Allergies:  Patient has no known allergies.    Scheduled Meds:allopurinol, 100 mg, Oral, Daily  aspirin, 81 mg, Oral, Daily  carvedilol, 6.25 mg, Oral, BID With Meals  colchicine, 0.6 mg, Oral, Daily  empagliflozin, 10 mg, Oral, Daily  furosemide, 40 mg, Oral, Daily  losartan, 25 mg, Oral, Q24H  rosuvastatin, 10 mg, Oral, Nightly  ticagrelor, 90 mg, Oral, BID      Continuous Infusions:     PRN Meds:.  acetaminophen **OR** acetaminophen **OR** acetaminophen    senna-docusate sodium **AND** polyethylene glycol **AND** bisacodyl **AND** bisacodyl    diphenhydrAMINE    melatonin    nitroglycerin    ondansetron ODT **OR** ondansetron    [COMPLETED] Insert Peripheral IV **AND** sodium chloride      OBJECTIVE    Vital Signs  Vitals:    02/05/24 0019 02/05/24 0119 02/05/24 0352 02/05/24 0500   BP:  104/73 103/62    BP Location:  Left arm Left arm    Patient Position:  Lying Lying    Pulse:  86 89    Resp:  14 16    Temp:  98.7 °F (37.1 °C) 98.2 °F (36.8 °C)    TempSrc:  Oral Oral    SpO2:  90% 95%    Weight: 72.1 kg (158 lb 15.2 oz)   71.7 kg (158 lb 1.1 oz)   Height:           Flowsheet Rows      Flowsheet Row First Filed Value   Admission Height 170.2 cm (67\") Documented at 02/01/2024 1722   Admission Weight 81.6 kg (180 lb) Documented at 02/01/2024 1722              Intake/Output Summary (Last 24 hours) at 2/5/2024 " 0719  Last data filed at 2/4/2024 0800  Gross per 24 hour   Intake 357 ml   Output --   Net 357 ml          Telemetry: Sinus rhythm    Physical Exam:  The patient is alert, oriented and in no distress.  Vital signs as noted above.  Head and neck revealed no carotid bruits or jugular venous distention.  No thyromegaly or lymphadenopathy is present  Lungs clear.  No wheezing.  Breath sounds are normal bilaterally.  Heart normal first and second heart sounds.  No murmur. No precordial rub is present.  No gallop is present.  Abdomen soft and nontender.  No organomegaly is present.  Extremities with good peripheral pulses without any pedal edema.  Skin warm and dry.  Musculoskeletal system is grossly normal.  CNS grossly normal.       Results Review:  I have personally reviewed the results from the time of this admission to 2/5/2024 07:19 EST and agree with these findings:  []  Laboratory  []  Microbiology  []  Radiology  []  EKG/Telemetry   []  Cardiology/Vascular   []  Pathology  []  Old records  []  Other:    Most notable findings include:    Lab Results (last 24 hours)       Procedure Component Value Units Date/Time    High Sensitivity Troponin T 2Hr [[id number]]  (Normal) Collected: 02/05/24 0225    Specimen: Blood from Arm, Left Updated: 02/05/24 0255     HS Troponin T 21 ng/L      Troponin T Delta 2 ng/L     Narrative:      High Sensitive Troponin T Reference Range:  <14.0 ng/L- Negative Female for AMI  <22.0 ng/L- Negative Male for AMI  >=14 - Abnormal Female indicating possible myocardial injury.  >=22 - Abnormal Male indicating possible myocardial injury.   Clinicians would have to utilize clinical acumen, EKG, Troponin, and serial changes to determine if it is an Acute Myocardial Infarction or myocardial injury due to an underlying chronic condition.         Basic Metabolic Panel [[id number]]  (Abnormal) Collected: 02/05/24 0225    Specimen: Blood from Arm, Left Updated: 02/05/24 0255     Glucose 98 mg/dL       BUN 20 mg/dL      Creatinine 0.95 mg/dL      Sodium 132 mmol/L      Potassium 3.6 mmol/L      Comment: Slight hemolysis detected by analyzer. Result may be falsely elevated.        Chloride 96 mmol/L      CO2 24.0 mmol/L      Calcium 9.2 mg/dL      BUN/Creatinine Ratio 21.1     Anion Gap 12.0 mmol/L      eGFR 92.2 mL/min/1.73     Narrative:      GFR Normal >60  Chronic Kidney Disease <60  Kidney Failure <15      CBC & Differential [[id number]]  (Abnormal) Collected: 02/05/24 0225    Specimen: Blood from Arm, Left Updated: 02/05/24 0239    Narrative:      The following orders were created for panel order CBC & Differential.  Procedure                               Abnormality         Status                     ---------                               -----------         ------                     CBC Auto Differential[[id number]]        Abnormal            Final result                 Please view results for these tests on the individual orders.    CBC Auto Differential [[id number]]  (Abnormal) Collected: 02/05/24 0225    Specimen: Blood from Arm, Left Updated: 02/05/24 0239     WBC 12.40 10*3/mm3      RBC 5.02 10*6/mm3      Hemoglobin 14.8 g/dL      Hematocrit 45.9 %      MCV 91.5 fL      MCH 29.6 pg      MCHC 32.3 g/dL      RDW 13.2 %      RDW-SD 42.0 fl      MPV 10.0 fL      Platelets 191 10*3/mm3      Neutrophil % 77.0 %      Lymphocyte % 8.2 %      Monocyte % 14.3 %      Eosinophil % 0.3 %      Basophil % 0.2 %      Neutrophils, Absolute 9.60 10*3/mm3      Lymphocytes, Absolute 1.00 10*3/mm3      Monocytes, Absolute 1.80 10*3/mm3      Eosinophils, Absolute 0.00 10*3/mm3      Basophils, Absolute 0.00 10*3/mm3      nRBC 0.0 /100 WBC     POC Chem Panel [[id number]]  (Abnormal) Collected: 02/04/24 1252    Specimen: Venous Blood Updated: 02/04/24 1322     Glucose 118 mg/dL      Comment: Serial Number: [id number]:  [id number]        Sodium 138 mmol/L      POC Potassium 3.8 mmol/L      Ionized Calcium 1.22 mmol/L       Hematocrit 47 %      Hemoglobin 16.0 g/dL      pH, Venous 7.420 pH Units      pCO2, Venous 42.8 mm Hg      CO2 CONTENT  --     HCO3, Venous 27.5 mmol/L      Base Excess, Venous --     Base Deficit --     O2 Saturation, Venous 29.0 %      pO2, Venous 36.0 mm Hg     POC Activated Clotting Time [[id number]]  (Abnormal) Collected: 02/04/24 1314    Specimen: Arterial Blood Updated: 02/04/24 1322     Activated Clotting Time  212 Seconds      Comment: Serial Number: [id number]:  [id number]       Uric Acid [[id number]]  (Normal) Collected: 02/04/24 1153    Specimen: Blood from Hand, Right Updated: 02/04/24 1243     Uric Acid 4.7 mg/dL     BNP [[id number]]  (Abnormal) Collected: 02/04/24 1153    Specimen: Blood from Hand, Right Updated: 02/04/24 1222     proBNP 1,503.0 pg/mL     Narrative:      This assay is used as an aid in the diagnosis of individuals suspected of having heart failure. It can be used as an aid in the diagnosis of acute decompensated heart failure (ADHF) in patients presenting with signs and symptoms of ADHF to the emergency department (ED). In addition, NT-proBNP of <300 pg/mL indicates ADHF is not likely.    Age Range Result Interpretation  NT-proBNP Concentration (pg/mL:      <50             Positive            >450                   Gray                 300-450                    Negative             <300    50-75           Positive            >900                  Gray                300-900                  Negative            <300      >75             Positive            >1800                  Gray                300-1800                  Negative            <300    High Sensitivity Troponin T [[id number]]  (Normal) Collected: 02/04/24 1153    Specimen: Blood from Hand, Right Updated: 02/04/24 1222     HS Troponin T 19 ng/L     Narrative:      High Sensitive Troponin T Reference Range:  <14.0 ng/L- Negative Female for AMI  <22.0 ng/L- Negative Male for AMI  >=14 - Abnormal Female indicating possible  myocardial injury.  >=22 - Abnormal Male indicating possible myocardial injury.   Clinicians would have to utilize clinical acumen, EKG, Troponin, and serial changes to determine if it is an Acute Myocardial Infarction or myocardial injury due to an underlying chronic condition.         Magnesium [[id number]]  (Normal) Collected: 02/04/24 1153    Specimen: Blood from Hand, Right Updated: 02/04/24 1219     Magnesium 2.0 mg/dL             Imaging Results (Last 24 Hours)       Procedure Component Value Units Date/Time    XR Ankle 2 View Right [[id number]] Collected: 02/04/24 1558     Updated: 02/04/24 1601    Narrative:      XR ANKLE 2 VW RIGHT    Date of Exam: 2/4/2024 3:49 PM EST    Indication: Pain    Comparison: None available.    Findings:  There is mild diffuse soft tissue swelling. Extensive arterial vascular calcification is present. No fractures or dislocations or focal osseous lesions. No periosteal reactions or bony destructive changes or significant bony degenerative change. No soft   tissue air or radiopaque foreign bodies.      Impression:      Impression:  Mild diffuse soft tissue swelling and extensive arterial vascular calcification. No acute bony abnormality or radiographic signs of osteomyelitis.        Electronically Signed: Dane Laird DO    2/4/2024 3:59 PM EST    Workstation ID: [id number]            LAB RESULTS (LAST 7 DAYS)    CBC  Results from last 7 days   Lab Units 02/05/24  0225 02/04/24  1252 02/04/24  0314 02/03/24  0202 02/02/24  0447 02/01/24  2232 02/01/24  1753   WBC 10*3/mm3 12.40*  --  10.80 10.30 9.40 9.80 11.80*   RBC 10*6/mm3 5.02  --  5.24 5.20 4.77 4.84 4.84   HEMOGLOBIN g/dL 14.8  --  15.6 15.4 14.4 14.3 15.1   HEMOGLOBIN, POC g/dL  --  16.0  --   --   --   --   --    HEMATOCRIT % 45.9  --  47.0 47.3 43.9 44.2 45.3   HEMATOCRIT POC %  --  47  --   --   --   --   --    MCV fL 91.5  --  89.8 91.0 91.9 91.3 93.5   PLATELETS 10*3/mm3 191  --  170 164 154 176 173       BMP  Results  from last 7 days   Lab Units 02/05/24 0225 02/04/24  1153 02/04/24 0314 02/03/24 0202 02/02/24  0918 02/01/24 2232 02/01/24  1753   SODIUM mmol/L 132*  --  140 144  --  139 142   POTASSIUM mmol/L 3.6  --  3.4* 3.8  --  3.8 4.7   CHLORIDE mmol/L 96*  --  102 104  --  104 108*   CO2 mmol/L 24.0  --  25.0 26.0  --  24.0 22.0   BUN mg/dL 20  --  17 19  --  16 18   CREATININE mg/dL 0.95  --  1.00 1.06  --  0.85 0.89   GLUCOSE mg/dL 98  --  109* 93  --  107* 91   MAGNESIUM mg/dL  --  2.0  --   --  1.9  --   --        CMP   Results from last 7 days   Lab Units 02/05/24 0225 02/04/24 0314 02/03/24 0202 02/01/24 2232 02/01/24  1753   SODIUM mmol/L 132* 140 144 139 142   POTASSIUM mmol/L 3.6 3.4* 3.8 3.8 4.7   CHLORIDE mmol/L 96* 102 104 104 108*   CO2 mmol/L 24.0 25.0 26.0 24.0 22.0   BUN mg/dL 20 17 19 16 18   CREATININE mg/dL 0.95 1.00 1.06 0.85 0.89   GLUCOSE mg/dL 98 109* 93 107* 91   ALBUMIN g/dL  --   --   --   --  3.8   BILIRUBIN mg/dL  --   --   --   --  0.7   ALK PHOS U/L  --   --   --   --  84   AST (SGOT) U/L  --   --   --   --  42*   ALT (SGPT) U/L  --   --   --   --  28       BNP        TROPONIN  Results from last 7 days   Lab Units 02/05/24 0225   HSTROP T ng/L 21       CoAg        Creatinine Clearance  Estimated Creatinine Clearance: 84.9 mL/min (by C-G formula based on SCr of 0.95 mg/dL).    ABG        Radiology  XR Ankle 2 View Right    Result Date: 2/4/2024  Impression: Mild diffuse soft tissue swelling and extensive arterial vascular calcification. No acute bony abnormality or radiographic signs of osteomyelitis. Electronically Signed: Dane Laird DO  2/4/2024 3:59 PM EST  Workstation ID: [id number]       EKG  I personally viewed and interpreted the patient's EKG/Telemetry data:  ECG 12 Lead Chest Pain   Final Result   HEART RATE= 111  bpm   RR Interval= 540  ms   NM Interval= 165  ms   P Horizontal Axis= -4  deg   P Front Axis= 57  deg   QRSD Interval= 79  ms   QT Interval= 342  ms   QTcB= 465   ms   QRS Axis= 21  deg   T Wave Axis= 107  deg   - ABNORMAL ECG -   Sinus tachycardia   Paired ventricular premature complexes   Probable left atrial enlargement   Abnrm R prog, consider ASMI or lead placement   Nonspecific T abnormalities, lateral leads   When compared with ECG of 19-Sep-2019 15:25:50,   Significant rate increase   Significant repolarization change   Electronically Signed By: Hans Locke (Con) 02-Feb-2024 06:22:51   Date and Time of Study: 2024-02-01 17:08:30      SCANNED - TELEMETRY     Final Result      SCANNED - TELEMETRY     Final Result      SCANNED - TELEMETRY     Final Result      SCANNED - TELEMETRY     Final Result      SCANNED - TELEMETRY     Final Result      SCANNED - TELEMETRY     Final Result      SCANNED - TELEMETRY     Final Result      SCANNED - TELEMETRY     Final Result      SCANNED - TELEMETRY     Final Result            Echocardiogram:    Results for orders placed during the hospital encounter of 02/01/24    Adult Transthoracic Echo Complete W/ Cont if Necessary Per Protocol    Interpretation Summary    Left ventricular systolic function is moderately decreased. Calculated left ventricular EF = 29% Left ventricular ejection fraction appears to be 26 - 30%.    The left ventricular cavity is severely dilated.    Left ventricular diastolic function is consistent with (grade II w/high LAP) pseudonormalization.    The left atrial cavity is dilated.    Estimated right ventricular systolic pressure from tricuspid regurgitation is normal (<35 mmHg).    No significant valvular abnormalities noted.        Stress Test:  Results for orders placed during the hospital encounter of 02/01/24    Stress Test With Myocardial Perfusion One Day    Interpretation Summary    Findings consistent with a normal ECG stress test.    Left ventricular ejection fraction is moderately reduced (Calculated EF = 27%).    Myocardial perfusion imaging indicates a large-sized, severe area of ischemia located in  the inferior wall.    Impressions are consistent with a high risk study.         Cardiac Catheterization:  No results found for this or any previous visit.         Other:         ASSESSMENT & PLAN:    Principal Problem:    CHF (congestive heart failure)  Active Problems:    Abnormal nuclear stress test    Vitamin D deficiency    Hypertension    Arthritis    Acute HFrEF (heart failure with reduced ejection fraction)    Acute HFrEF  Echocardiogram shows EF of 29% with grade 2 diastolic dysfunction  Elevated proBNP, minimally elevated high-sensitivity troponin.  Can resume oral diuretics today.  EDP during cardiac cath was normal.    Sodium dropped from 140-132.  Repeat labs outpatient  Continue losartan, beta-blocker and Jardiance.  Plan will be to repeat echocardiogram in 3 months after maximally tolerated GDMT has been given.  Plan explained to the patient and he is in agreement  Cardiomyopathy is out of proportion to coronary disease.  I recommended cardiac rehab however patient is not interested    Coronary artery disease  Status post PCI with VELASQUEZ placement to RCA  Continue dual antiplatelet therapy and high intensity statin  Medical management for nonobstructive disease in the LAD.     Hypertension  Continue losartan and beta-blocker     Hyperlipidemia  LDL 38, HDL 45, triglyceride 93 and total cholesterol 101  Added statin.  He is already at goal with LDL     Overweight  BMI is 26  He weighs 166 pounds  Lifestyle modifications recommended to the patient.    Okay to discharge from cardiac standpoint.          Yaw Zavaleta MD  02/05/24  07:19 EST

## 2024-02-05 NOTE — CASE MANAGEMENT/SOCIAL WORK
Case Management Discharge Note                Selected Continued Care - Discharged on 2/5/2024 Admission date: 2/1/2024 - Discharge disposition: Home or Self Care            Transportation Services  Private: Car    Final Discharge Disposition Code: 01 - home or self-care

## 2024-02-05 NOTE — DISCHARGE SUMMARY
[facility] Medicine Services  Discharge Summary    Date of Service: 24  Patient Name: John Morales  : 1965  MRN: [id number]    Date of Admission: 2024  Discharge Diagnosis: Congestive Heart Failure-New Diagnosis  Date of Discharge:  24  Primary Care Physician: Nora Sterling MD      Presenting Problem:   CHF (congestive heart failure) [I50.9]  Acute congestive heart failure, unspecified heart failure type [I50.9]  Acute HFrEF (heart failure with reduced ejection fraction) [I50.21]    Active and Resolved Hospital Problems:  Active Hospital Problems    Diagnosis POA    **CHF (congestive heart failure) [I50.9] Yes    Acute HFrEF (heart failure with reduced ejection fraction) [I50.21] Yes    Abnormal nuclear stress test [R94.39] Unknown    Hypertension [I10] Yes    Arthritis [M19.90] Yes    Vitamin D deficiency [E55.9] Yes      Resolved Hospital Problems   No resolved problems to display.       Congestive Heart Failure-New Diagnosis  Essential Hypertension-Uncontrolled due to non compliance with medication  -BNP 2885  -Initial Troponin 19, 27  -EKG reviewed  -Chest xray reviewed, pulmonary edema noted  -Tridil drip started in ED for BP control, continue and wean as tolerated  -Restart home BP medications when verified  -Lasix 40mg IV given in ED, continue Q12H  -ECHO and stress test ordered  -Continuous cardiac monitoring  -Heart Failure Pathway initiated  Coreg 3.125mg BID  Jardiance 10mg daily  Daily weights  Strict I&O  Heart Failure Navigator Consult  -Cardiology consulted     Hospital Course     History of Present Illness: John Morales is a 58 y.o. male with a previous medical history of HTN who presented to [facility] on 2024 with  shortness of breath worse with exertion and cold weather and bilateral lower extremity swelling for the last three months.  He saw his PCP today who was concerned for heart failure and sent him to the ED for further  evaluation.  The patient reports that he is supposed to take Amlodipine and Lisinopril but stopped months ago.  He denies chest pain or palpitations.      In the ED, BNP is 2885, Troponin 19, 27. Chest xray shows pulmonary edema versus multifocal pneumonia.  He is afebrile,  hypertensive at 146/96 and tachycardic at 113 on exam.   EKG reviewed, sinus tachycardia with PVCs and probably left atrial enlargement, .  He was started on a Tridil drip and given Lasix, 40mg IV in the ED.  Hospitalist was consulted for further care and management.     2/2/24 seen in bed NAD, no new complaints, anxious to go home, DW RN  2/3/2024 patient seen and examined in bed no acute distress, underwent stress test and echo.  Pending results, discussed with RN.  Will discharge home if stress test and echo are normal.  2/4/24 patient seen and examined in bed no acute distress, no complaints, denies chest pain.  Vital signs stable, for cardiac cath today.  Patient is complaining of right ankle pain.  Per patient he was treated with Uloric in the past check uric acid.  Will obtain ankle x-ray.  2/5/24 patient seen and examined in bed no acute distress, complaining of knee pain and ankle pain.  Patient does not want to see Ortho today.  Will discharge patient home.  Follow with PCP in a week.  Condition at discharge stable.    DISCHARGE Follow Up Recommendations for labs and diagnostics: Follow-up with PCP in a week.    Follow-up with Ortho in 2 weeks.    Reasons For Change In Medications and Indications for New Medications:   START taking:  aspirin   carvedilol (COREG)   colchicine   furosemide (Lasix)   Jardiance (empagliflozin)   losartan (Cozaar)   nitroglycerin (NITROSTAT)   potassium chloride   rosuvastatin (CRESTOR)   ticagrelor (BRILINTA)    STOP taking:  lisinopril 40 MG tablet (PRINIVIL,ZESTRIL)     Day of Discharge     Vital Signs:  Temp:  [97.5 °F (36.4 °C)-99 °F (37.2 °C)] 97.5 °F (36.4 °C)  Heart Rate:  [85-95] 92  Resp:   [14-18] 16  BP: (100-140)/(62-97) 131/89    Physical Exam Constitutional:       Appearance: Normal appearance.   HENT:      Head: Normocephalic and atraumatic.      Nose: Nose normal.      Mouth/Throat:      Mouth: Mucous membranes are moist.   Eyes:      Extraocular Movements: Extraocular movements intact.      Pupils: Pupils are equal, round, and reactive to light.   Cardiovascular:      Rate and Rhythm: Regular rhythm. Tachycardia present. Occasional Extrasystoles are present.     Pulses: Normal pulses.      Heart sounds: Normal heart sounds.   Pulmonary:      Effort: Pulmonary effort is normal.      Breath sounds: Examination of the right-lower field reveals decreased breath sounds. Examination of the left-lower field reveals decreased breath sounds. Decreased breath sounds present.   Abdominal:      General: Bowel sounds are normal.      Palpations: Abdomen is soft.   Musculoskeletal:         General: Normal range of motion.      Cervical back: Normal range of motion.      Right lower le+ Edema present.      Left lower le+ Edema present.   Skin:     General: Skin is warm and dry.   Neurological:      General: No focal deficit present.      Mental Status: He is alert and oriented to person, place, and time. Mental status is at baseline.   Psychiatric:         Mood and Affect: Mood normal.         Behavior: Behavior normal. Behavior is agitated. Behavior is cooperative.          Pertinent  and/or Most Recent Results     LAB RESULTS:      Lab 24  0225 24  1252 24  0314 24  0202 24  0447 24  2232 24  1753   WBC 12.40*  --  10.80 10.30 9.40 9.80 11.80*   HEMOGLOBIN 14.8  --  15.6 15.4 14.4 14.3 15.1   HEMOGLOBIN, POC  --  15.6  16.0  --   --   --   --   --    HEMATOCRIT 45.9  --  47.0 47.3 43.9 44.2 45.3   HEMATOCRIT POC  --  46  47  --   --   --   --   --    PLATELETS 191  --  170 164 154 176 173   NEUTROS ABS 9.60*  --  7.80*  --   --   --  9.50*   LYMPHS ABS  1.00  --  1.40  --   --   --  1.00   MONOS ABS 1.80*  --  1.50*  --   --   --  1.20*   EOS ABS 0.00  --  0.10  --   --   --  0.10   MCV 91.5  --  89.8 91.0 91.9 91.3 93.5         Lab 02/05/24 0225 02/04/24  1153 02/04/24 0314 02/03/24 0202 02/02/24 0918 02/01/24 2232 02/01/24  1753   SODIUM 132*  --  140 144  --  139 142   POTASSIUM 3.6  --  3.4* 3.8  --  3.8 4.7   CHLORIDE 96*  --  102 104  --  104 108*   CO2 24.0  --  25.0 26.0  --  24.0 22.0   ANION GAP 12.0  --  13.0 14.0  --  11.0 12.0   BUN 20  --  17 19  --  16 18   CREATININE 0.95  --  1.00 1.06  --  0.85 0.89   EGFR 92.2  --  86.7 80.8  --  100.1 98.7   GLUCOSE 98  --  109* 93  --  107* 91   CALCIUM 9.2  --  9.0 9.2  --  9.0 9.2   MAGNESIUM  --  2.0  --   --  1.9  --   --    HEMOGLOBIN A1C  --   --   --  5.50  --   --   --          Lab 02/01/24  1753   TOTAL PROTEIN 6.1   ALBUMIN 3.8   GLOBULIN 2.3   ALT (SGPT) 28   AST (SGOT) 42*   BILIRUBIN 0.7   ALK PHOS 84         Lab 02/05/24 0225 02/04/24 1153 02/01/24 2017 02/01/24  1753   PROBNP  --  1,503.0*  --  2,885.0*   HSTROP T 21 19 27* 19         Lab 02/02/24  0918   CHOLESTEROL 101   LDL CHOL 38   HDL CHOL 45   TRIGLYCERIDES 93             Brief Urine Lab Results       None          Microbiology Results (last 10 days)       ** No results found for the last 240 hours. **            XR Ankle 2 View Right    Result Date: 2/4/2024  Impression: Impression: Mild diffuse soft tissue swelling and extensive arterial vascular calcification. No acute bony abnormality or radiographic signs of osteomyelitis. Electronically Signed: Dane Laird DO  2/4/2024 3:59 PM EST  Workstation ID: [id number]    XR Chest 1 View    Result Date: 2/1/2024  Impression: 1.Ill-defined multifocal airspace infiltrates bilaterally with diffuse interstitial changes. The findings suggest developing atypical/viral infection or multifocal pneumonia. Changes of pulmonary edema could also be considered. Recommend correlation for signs or  symptoms of acute infection and follow-up to ensure improvement/resolution. Electronically Signed: Yuval Blankenship MD  2/1/2024 5:49 PM EST  Workstation ID: [id number]             Results for orders placed during the hospital encounter of 02/01/24    Adult Transthoracic Echo Complete W/ Cont if Necessary Per Protocol    Interpretation Summary    Left ventricular systolic function is moderately decreased. Calculated left ventricular EF = 29% Left ventricular ejection fraction appears to be 26 - 30%.    The left ventricular cavity is severely dilated.    Left ventricular diastolic function is consistent with (grade II w/high LAP) pseudonormalization.    The left atrial cavity is dilated.    Estimated right ventricular systolic pressure from tricuspid regurgitation is normal (<35 mmHg).    No significant valvular abnormalities noted.      Labs Pending at Discharge:      Procedures Performed  Procedure(s):  Right and Left Heart Cath         Consults:   Consults       Date and Time Order Name Status Description    2/1/2024  8:40 PM Inpatient Cardiology Consult      2/1/2024  7:21 PM Hospitalist (on-call MD unless specified)              ASSESSMENT & PLAN:     Principal Problem:    CHF (congestive heart failure)  Active Problems:    Abnormal nuclear stress test    Vitamin D deficiency    Hypertension    Arthritis    Acute HFrEF (heart failure with reduced ejection fraction)     Acute HFrEF  Echocardiogram shows EF of 29% with grade 2 diastolic dysfunction  Elevated proBNP, minimally elevated high-sensitivity troponin.  Can resume oral diuretics today.  EDP during cardiac cath was normal.    Sodium dropped from 140-132.  Repeat labs outpatient  Continue losartan, beta-blocker and Jardiance.  Plan will be to repeat echocardiogram in 3 months after maximally tolerated GDMT has been given.  Plan explained to the patient and he is in agreement  Cardiomyopathy is out of proportion to coronary disease.  I recommended cardiac rehab  however patient is not interested     Coronary artery disease  Status post PCI with VELASQUEZ placement to RCA  Continue dual antiplatelet therapy and high intensity statin  Medical management for nonobstructive disease in the LAD.     Hypertension  Continue losartan and beta-blocker     Hyperlipidemia  LDL 38, HDL 45, triglyceride 93 and total cholesterol 101  Added statin.  He is already at goal with LDL     Overweight  BMI is 26  He weighs 166 pounds  Lifestyle modifications recommended to the patient.     Okay to discharge from cardiac standpoint.              Yaw Zavaleta MD  02/05/24  07:19 EST    Discharge Details        Discharge Medications        New Medications        Instructions Start Date   aspirin 81 MG EC tablet   81 mg, Oral, Daily   Start Date: February 6, 2024     carvedilol 6.25 MG tablet  Commonly known as: COREG   6.25 mg, Oral, 2 Times Daily With Meals      colchicine 0.6 MG tablet   0.6 mg, Oral, Daily   Start Date: February 6, 2024     furosemide 20 MG tablet  Commonly known as: Lasix   20 mg, Oral, 2 Times Daily      Jardiance 10 MG tablet tablet  Generic drug: empagliflozin   10 mg, Oral, Daily      losartan 25 MG tablet  Commonly known as: Cozaar   25 mg, Oral, Daily      nitroglycerin 0.4 MG SL tablet  Commonly known as: NITROSTAT   0.4 mg, Sublingual, Every 5 Minutes PRN, Take no more than 3 doses in 15 minutes.      potassium chloride 10 MEQ CR tablet   10 mEq, Oral, 2 Times Daily      rosuvastatin 10 MG tablet  Commonly known as: CRESTOR   10 mg, Oral, Nightly      ticagrelor 90 MG tablet tablet  Commonly known as: BRILINTA   90 mg, Oral, 2 Times Daily             Continue These Medications        Instructions Start Date   allopurinol 100 MG tablet  Commonly known as: ZYLOPRIM   100 mg, Oral, Daily             Stop These Medications      lisinopril 40 MG tablet  Commonly known as: PRINIVIL,ZESTRIL              No Known Allergies      Discharge Disposition:   Home or Self  Care    Diet:  Hospital:  Diet Order   Procedures    Diet: Cardiac Diets, Diabetic Diets; Healthy Heart (2-3 Na+); Consistent Carbohydrate; Texture: Regular Texture (IDDSI 7); Fluid Consistency: Thin (IDDSI 0)         Discharge Activity:         CODE STATUS:  Code Status and Medical Interventions:   Ordered at: 02/01/24 2036     Code Status (Patient has no pulse and is not breathing):    CPR (Attempt to Resuscitate)     Medical Interventions (Patient has pulse or is breathing):    Full Support         Future Appointments   Date Time Provider Department Center   2/20/2024  3:45 PM Yaw Zavaleta MD [facility]       Additional Instructions for the Follow-ups that You Need to Schedule       Ambulatory Referral to Cardiac Rehab   As directed      Discharge Follow-up with PCP   As directed       Currently Documented PCP:    Nora Sterling MD    PCP Phone Number:    [phone]     Follow Up Details: pcp   Follow Up: 1 Week        Discharge Follow-up with Specialty: Cardiology; 1 Week   As directed      Specialty: Cardiology   Follow Up: 1 Week                Time spent on Discharge including face to face service:  >30 minutes    Signature: Electronically signed by Alberto Miguel MD, 02/05/24, 10:21 EST.  [facility] Hospitalist Team

## 2024-02-05 NOTE — NURSING NOTE
Pt was ready for discharge and ride was waiting out front.  Pt stated he got a call from meds to bed and had more meds.  Pt stated he paid for the meds and was waing for delivery;  spoke with pharm and  explained ride was waiting   and she stated script not ready yet but pt could have transport bring him by pharm and pick it up. Pt agreeable transport placed

## 2024-02-05 NOTE — OUTREACH NOTE
Prep Survey      Flowsheet Row Responses   [facility] [place], operated by [facility] patient discharged from? [facility]   Is LACE score < 7 ? No   Eligibility [facility]   Date of Admission 02/01/24   Date of Discharge 02/05/24   Discharge Disposition Home or Self Care   Discharge diagnosis A/C CHF, heart cath   Does the patient have one of the following disease processes/diagnoses(primary or secondary)? CHF   Does the patient have Home health ordered? No   Is there a DME ordered? No   Is this a private patient? Yes   Prep survey completed? Yes            Sharon CONKLIN - Registered Nurse

## 2024-02-05 NOTE — PAYOR COMM NOTE
"This is discharge notification for John Morales   Reference/Auth # [id number]   Pt discharged routine to home on 2/5/24.    AUTHORIZATION PENDING:     Please call or fax determination to contact below.   Thank you.    SURINDER Lawrence, RN, [facility]  Utilization Review Nurse  [facility]  Direct & confidential phone # [phone]  Fax # [phone]        John Morales (58 y.o. Male)       Date of Birth   1965    Social Security Number       Address   [address] [place] [address]    Home Phone   [phone]    MRN   [id number]       Latter-day   Mormonism    Marital Status   Single                            Admission Date   2/1/24    Admission Type   Emergency    Admitting Provider   Abner Holloway DO    Attending Provider       Department, Room/Bed   [facility] PROGRESS CARE, 2122/1       Discharge Date   2/5/2024    Discharge Disposition   Home or Self Care    Discharge Destination                                 Attending Provider: (none)   Allergies: No Known Allergies    Isolation: None   Infection: None   Code Status: Prior    Ht: 170.2 cm (67\")   Wt: 71.7 kg (158 lb 1.1 oz)    Admission Cmt: None   Principal Problem: Acute HFrEF (heart failure with reduced ejection fraction) [I50.21]                   Active Insurance as of 2/1/2024       Primary Coverage       Payor Plan Insurance Group Employer/Plan Group    [facility] [id number]       Coverage Address Coverage Phone Number Coverage Fax Number Effective Dates    [address] [address] [phone]  1/1/2023 - None Entered    [facility] [address]         Subscriber Name Subscriber Birth Date Member ID       JOHN MORALES 1965 [id number]                     Emergency Contacts        (Rel.) Home Phone Work Phone Mobile Phone    KUNAL MORALES (Father) -- -- [phone]              Operative/Procedure Notes (last 48 hours)  Notes from " 02/03/24 1411 through 02/05/24 1411   No notes of this type exist for this encounter.          Physician Progress Notes (last 48 hours)        Yaw Zavaleta MD at 02/05/24 0719          Referring Provider: Alberto Miguel MD    Reason for follow-up: New onset HFrEF     Patient Care Team:  Nora Sterling MD as PCP - General (Family Medicine)  Giorgio Kennedy MD as Consulting Physician (Gastroenterology)      SUBJECTIVE  Wants to go home today.  Not interested in cardiac rehab     ROS  Review of all systems negative except as indicated.    Since I have last seen, the patient has been without any chest discomfort, shortness of breath, palpitations, dizziness or syncope.  Denies having any headache, abdominal pain, nausea, vomiting, diarrhea, constipation, loss of weight or loss of appetite.  Denies having any excessive bruising, hematuria or blood in the stool.  ROS      Personal History:    Past Medical History:   Diagnosis Date    Ankle fracture     right ankle    Arthritis     Gout    Back injury 07/01/2007    broke    CHF (congestive heart failure) 2/1/2024    Hypertension        Past Surgical History:   Procedure Laterality Date    ARM WOUND REPAIR / CLOSURE Left 2014    infection in bone    COLONOSCOPY N/A 9/26/2019    Procedure: COLONOSCOPY WITH POLYPECTOMY X 2,;  Surgeon: Sukhdev Silva MD;  Location: [facility] MAIN OR;  Service: Gastroenterology    MASS EXCISION N/A 9/26/2019    Procedure: Excision of perianal skin tags;  Surgeon: Robert Stinson MD;  Location: [facility] MAIN OR;  Service: General    SKIN TAG REMOVAL         Family History   Problem Relation Age of Onset    No Known Problems Mother     No Known Problems Father     No Known Problems Sister     No Known Problems Brother        Social History     Tobacco Use    Smoking status: Never    Smokeless tobacco: Never   Vaping Use    Vaping Use: Never used   Substance Use Topics    Alcohol use: No    Drug use: No         Home meds:  Prior to Admission medications    Medication Sig Start Date End Date Taking? Authorizing Provider   allopurinol (ZYLOPRIM) 100 MG tablet Take 1 tablet by mouth Daily. 6/22/19  Yes Gerda Almaraz MD   carvedilol (COREG) 6.25 MG tablet Take 1 tablet by mouth 2 (Two) Times a Day With Meals. 2/2/24  Yes Alberto Miguel MD   empagliflozin (JARDIANCE) 10 MG tablet tablet Take 1 tablet by mouth Daily. 2/3/24  Yes Alberto Miguel MD   furosemide (Lasix) 20 MG tablet Take 1 tablet by mouth 2 (Two) Times a Day. 2/2/24  Yes Alberto Miguel MD   lisinopril (PRINIVIL,ZESTRIL) 40 MG tablet Take 1 tablet by mouth Daily. 6/22/19  Yes Gerda Almaraz MD   losartan (Cozaar) 25 MG tablet Take 1 tablet by mouth Daily. 2/2/24   Alberto Miguel MD       Allergies:  Patient has no known allergies.    Scheduled Meds:allopurinol, 100 mg, Oral, Daily  aspirin, 81 mg, Oral, Daily  carvedilol, 6.25 mg, Oral, BID With Meals  colchicine, 0.6 mg, Oral, Daily  empagliflozin, 10 mg, Oral, Daily  furosemide, 40 mg, Oral, Daily  losartan, 25 mg, Oral, Q24H  rosuvastatin, 10 mg, Oral, Nightly  ticagrelor, 90 mg, Oral, BID      Continuous Infusions:     PRN Meds:.  acetaminophen **OR** acetaminophen **OR** acetaminophen    senna-docusate sodium **AND** polyethylene glycol **AND** bisacodyl **AND** bisacodyl    diphenhydrAMINE    melatonin    nitroglycerin    ondansetron ODT **OR** ondansetron    [COMPLETED] Insert Peripheral IV **AND** sodium chloride      OBJECTIVE    Vital Signs  Vitals:    02/05/24 0019 02/05/24 0119 02/05/24 0352 02/05/24 0500   BP:  104/73 103/62    BP Location:  Left arm Left arm    Patient Position:  Lying Lying    Pulse:  86 89    Resp:  14 16    Temp:  98.7 °F (37.1 °C) 98.2 °F (36.8 °C)    TempSrc:  Oral Oral    SpO2:  90% 95%    Weight: 72.1 kg (158 lb 15.2 oz)   71.7 kg (158 lb 1.1 oz)   Height:           Flowsheet Rows      Flowsheet Row First Filed Value   Admission Height  "170.2 cm (67\") Documented at 02/01/2024 1722   Admission Weight 81.6 kg (180 lb) Documented at 02/01/2024 1722              Intake/Output Summary (Last 24 hours) at 2/5/2024 0719  Last data filed at 2/4/2024 0800  Gross per 24 hour   Intake 357 ml   Output --   Net 357 ml          Telemetry: Sinus rhythm    Physical Exam:  The patient is alert, oriented and in no distress.  Vital signs as noted above.  Head and neck revealed no carotid bruits or jugular venous distention.  No thyromegaly or lymphadenopathy is present  Lungs clear.  No wheezing.  Breath sounds are normal bilaterally.  Heart normal first and second heart sounds.  No murmur. No precordial rub is present.  No gallop is present.  Abdomen soft and nontender.  No organomegaly is present.  Extremities with good peripheral pulses without any pedal edema.  Skin warm and dry.  Musculoskeletal system is grossly normal.  CNS grossly normal.       Results Review:  I have personally reviewed the results from the time of this admission to 2/5/2024 07:19 EST and agree with these findings:  []  Laboratory  []  Microbiology  []  Radiology  []  EKG/Telemetry   []  Cardiology/Vascular   []  Pathology  []  Old records  []  Other:    Most notable findings include:    Lab Results (last 24 hours)       Procedure Component Value Units Date/Time    High Sensitivity Troponin T 2Hr [[id number]]  (Normal) Collected: 02/05/24 0225    Specimen: Blood from Arm, Left Updated: 02/05/24 0255     HS Troponin T 21 ng/L      Troponin T Delta 2 ng/L     Narrative:      High Sensitive Troponin T Reference Range:  <14.0 ng/L- Negative Female for AMI  <22.0 ng/L- Negative Male for AMI  >=14 - Abnormal Female indicating possible myocardial injury.  >=22 - Abnormal Male indicating possible myocardial injury.   Clinicians would have to utilize clinical acumen, EKG, Troponin, and serial changes to determine if it is an Acute Myocardial Infarction or myocardial injury due to an underlying chronic " condition.         Basic Metabolic Panel [[id number]]  (Abnormal) Collected: 02/05/24 0225    Specimen: Blood from Arm, Left Updated: 02/05/24 0255     Glucose 98 mg/dL      BUN 20 mg/dL      Creatinine 0.95 mg/dL      Sodium 132 mmol/L      Potassium 3.6 mmol/L      Comment: Slight hemolysis detected by analyzer. Result may be falsely elevated.        Chloride 96 mmol/L      CO2 24.0 mmol/L      Calcium 9.2 mg/dL      BUN/Creatinine Ratio 21.1     Anion Gap 12.0 mmol/L      eGFR 92.2 mL/min/1.73     Narrative:      GFR Normal >60  Chronic Kidney Disease <60  Kidney Failure <15      CBC & Differential [[id number]]  (Abnormal) Collected: 02/05/24 0225    Specimen: Blood from Arm, Left Updated: 02/05/24 0239    Narrative:      The following orders were created for panel order CBC & Differential.  Procedure                               Abnormality         Status                     ---------                               -----------         ------                     CBC Auto Differential[[id number]]        Abnormal            Final result                 Please view results for these tests on the individual orders.    CBC Auto Differential [[id number]]  (Abnormal) Collected: 02/05/24 0225    Specimen: Blood from Arm, Left Updated: 02/05/24 0239     WBC 12.40 10*3/mm3      RBC 5.02 10*6/mm3      Hemoglobin 14.8 g/dL      Hematocrit 45.9 %      MCV 91.5 fL      MCH 29.6 pg      MCHC 32.3 g/dL      RDW 13.2 %      RDW-SD 42.0 fl      MPV 10.0 fL      Platelets 191 10*3/mm3      Neutrophil % 77.0 %      Lymphocyte % 8.2 %      Monocyte % 14.3 %      Eosinophil % 0.3 %      Basophil % 0.2 %      Neutrophils, Absolute 9.60 10*3/mm3      Lymphocytes, Absolute 1.00 10*3/mm3      Monocytes, Absolute 1.80 10*3/mm3      Eosinophils, Absolute 0.00 10*3/mm3      Basophils, Absolute 0.00 10*3/mm3      nRBC 0.0 /100 WBC     POC Chem Panel [[id number]]  (Abnormal) Collected: 02/04/24 1252    Specimen: Venous Blood Updated: 02/04/24 1322      Glucose 118 mg/dL      Comment: Serial Number: [id number]:  [id number]        Sodium 138 mmol/L      POC Potassium 3.8 mmol/L      Ionized Calcium 1.22 mmol/L      Hematocrit 47 %      Hemoglobin 16.0 g/dL      pH, Venous 7.420 pH Units      pCO2, Venous 42.8 mm Hg      CO2 CONTENT  --     HCO3, Venous 27.5 mmol/L      Base Excess, Venous --     Base Deficit --     O2 Saturation, Venous 29.0 %      pO2, Venous 36.0 mm Hg     POC Activated Clotting Time [[id number]]  (Abnormal) Collected: 02/04/24 1314    Specimen: Arterial Blood Updated: 02/04/24 1322     Activated Clotting Time  212 Seconds      Comment: Serial Number: [id number]:  [id number]       Uric Acid [[id number]]  (Normal) Collected: 02/04/24 1153    Specimen: Blood from Hand, Right Updated: 02/04/24 1243     Uric Acid 4.7 mg/dL     BNP [[id number]]  (Abnormal) Collected: 02/04/24 1153    Specimen: Blood from Hand, Right Updated: 02/04/24 1222     proBNP 1,503.0 pg/mL     Narrative:      This assay is used as an aid in the diagnosis of individuals suspected of having heart failure. It can be used as an aid in the diagnosis of acute decompensated heart failure (ADHF) in patients presenting with signs and symptoms of ADHF to the emergency department (ED). In addition, NT-proBNP of <300 pg/mL indicates ADHF is not likely.    Age Range Result Interpretation  NT-proBNP Concentration (pg/mL:      <50             Positive            >450                   Gray                 300-450                    Negative             <300    50-75           Positive            >900                  Gray                300-900                  Negative            <300      >75             Positive            >1800                  Gray                300-1800                  Negative            <300    High Sensitivity Troponin T [[id number]]  (Normal) Collected: 02/04/24 1153    Specimen: Blood from Hand, Right Updated: 02/04/24 1222     HS Troponin T 19 ng/L     Narrative:       High Sensitive Troponin T Reference Range:  <14.0 ng/L- Negative Female for AMI  <22.0 ng/L- Negative Male for AMI  >=14 - Abnormal Female indicating possible myocardial injury.  >=22 - Abnormal Male indicating possible myocardial injury.   Clinicians would have to utilize clinical acumen, EKG, Troponin, and serial changes to determine if it is an Acute Myocardial Infarction or myocardial injury due to an underlying chronic condition.         Magnesium [[id number]]  (Normal) Collected: 02/04/24 1153    Specimen: Blood from Hand, Right Updated: 02/04/24 1219     Magnesium 2.0 mg/dL             Imaging Results (Last 24 Hours)       Procedure Component Value Units Date/Time    XR Ankle 2 View Right [[id number]] Collected: 02/04/24 1558     Updated: 02/04/24 1601    Narrative:      XR ANKLE 2 VW RIGHT    Date of Exam: 2/4/2024 3:49 PM EST    Indication: Pain    Comparison: None available.    Findings:  There is mild diffuse soft tissue swelling. Extensive arterial vascular calcification is present. No fractures or dislocations or focal osseous lesions. No periosteal reactions or bony destructive changes or significant bony degenerative change. No soft   tissue air or radiopaque foreign bodies.      Impression:      Impression:  Mild diffuse soft tissue swelling and extensive arterial vascular calcification. No acute bony abnormality or radiographic signs of osteomyelitis.        Electronically Signed: Dane Laird DO    2/4/2024 3:59 PM EST    Workstation ID: [id number]            LAB RESULTS (LAST 7 DAYS)    CBC  Results from last 7 days   Lab Units 02/05/24  0225 02/04/24  1252 02/04/24  0314 02/03/24  0202 02/02/24  0447 02/01/24  2232 02/01/24  1753   WBC 10*3/mm3 12.40*  --  10.80 10.30 9.40 9.80 11.80*   RBC 10*6/mm3 5.02  --  5.24 5.20 4.77 4.84 4.84   HEMOGLOBIN g/dL 14.8  --  15.6 15.4 14.4 14.3 15.1   HEMOGLOBIN, POC g/dL  --  16.0  --   --   --   --   --    HEMATOCRIT % 45.9  --  47.0 47.3 43.9 44.2 45.3    HEMATOCRIT POC %  --  47  --   --   --   --   --    MCV fL 91.5  --  89.8 91.0 91.9 91.3 93.5   PLATELETS 10*3/mm3 191  --  170 164 154 176 173       BMP  Results from last 7 days   Lab Units 02/05/24 0225 02/04/24  1153 02/04/24 0314 02/03/24 0202 02/02/24 0918 02/01/24 2232 02/01/24  1753   SODIUM mmol/L 132*  --  140 144  --  139 142   POTASSIUM mmol/L 3.6  --  3.4* 3.8  --  3.8 4.7   CHLORIDE mmol/L 96*  --  102 104  --  104 108*   CO2 mmol/L 24.0  --  25.0 26.0  --  24.0 22.0   BUN mg/dL 20  --  17 19  --  16 18   CREATININE mg/dL 0.95  --  1.00 1.06  --  0.85 0.89   GLUCOSE mg/dL 98  --  109* 93  --  107* 91   MAGNESIUM mg/dL  --  2.0  --   --  1.9  --   --        CMP   Results from last 7 days   Lab Units 02/05/24 0225 02/04/24 0314 02/03/24 0202 02/01/24 2232 02/01/24  1753   SODIUM mmol/L 132* 140 144 139 142   POTASSIUM mmol/L 3.6 3.4* 3.8 3.8 4.7   CHLORIDE mmol/L 96* 102 104 104 108*   CO2 mmol/L 24.0 25.0 26.0 24.0 22.0   BUN mg/dL 20 17 19 16 18   CREATININE mg/dL 0.95 1.00 1.06 0.85 0.89   GLUCOSE mg/dL 98 109* 93 107* 91   ALBUMIN g/dL  --   --   --   --  3.8   BILIRUBIN mg/dL  --   --   --   --  0.7   ALK PHOS U/L  --   --   --   --  84   AST (SGOT) U/L  --   --   --   --  42*   ALT (SGPT) U/L  --   --   --   --  28       BNP        TROPONIN  Results from last 7 days   Lab Units 02/05/24 0225   HSTROP T ng/L 21       CoAg        Creatinine Clearance  Estimated Creatinine Clearance: 84.9 mL/min (by C-G formula based on SCr of 0.95 mg/dL).    ABG        Radiology  XR Ankle 2 View Right    Result Date: 2/4/2024  Impression: Mild diffuse soft tissue swelling and extensive arterial vascular calcification. No acute bony abnormality or radiographic signs of osteomyelitis. Electronically Signed: Dane Laird DO  2/4/2024 3:59 PM EST  Workstation ID: [id number]       EKG  I personally viewed and interpreted the patient's EKG/Telemetry data:  ECG 12 Lead Chest Pain   Final Result   HEART RATE= 111   bpm   RR Interval= 540  ms   MI Interval= 165  ms   P Horizontal Axis= -4  deg   P Front Axis= 57  deg   QRSD Interval= 79  ms   QT Interval= 342  ms   QTcB= 465  ms   QRS Axis= 21  deg   T Wave Axis= 107  deg   - ABNORMAL ECG -   Sinus tachycardia   Paired ventricular premature complexes   Probable left atrial enlargement   Abnrm R prog, consider ASMI or lead placement   Nonspecific T abnormalities, lateral leads   When compared with ECG of 19-Sep-2019 15:25:50,   Significant rate increase   Significant repolarization change   Electronically Signed By: Hans Locke (Con) 02-Feb-2024 06:22:51   Date and Time of Study: 2024-02-01 17:08:30      SCANNED - TELEMETRY     Final Result      SCANNED - TELEMETRY     Final Result      SCANNED - TELEMETRY     Final Result      SCANNED - TELEMETRY     Final Result      SCANNED - TELEMETRY     Final Result      SCANNED - TELEMETRY     Final Result      SCANNED - TELEMETRY     Final Result      SCANNED - TELEMETRY     Final Result      SCANNED - TELEMETRY     Final Result            Echocardiogram:    Results for orders placed during the hospital encounter of 02/01/24    Adult Transthoracic Echo Complete W/ Cont if Necessary Per Protocol    Interpretation Summary    Left ventricular systolic function is moderately decreased. Calculated left ventricular EF = 29% Left ventricular ejection fraction appears to be 26 - 30%.    The left ventricular cavity is severely dilated.    Left ventricular diastolic function is consistent with (grade II w/high LAP) pseudonormalization.    The left atrial cavity is dilated.    Estimated right ventricular systolic pressure from tricuspid regurgitation is normal (<35 mmHg).    No significant valvular abnormalities noted.        Stress Test:  Results for orders placed during the hospital encounter of 02/01/24    Stress Test With Myocardial Perfusion One Day    Interpretation Summary    Findings consistent with a normal ECG stress test.    Left  ventricular ejection fraction is moderately reduced (Calculated EF = 27%).    Myocardial perfusion imaging indicates a large-sized, severe area of ischemia located in the inferior wall.    Impressions are consistent with a high risk study.         Cardiac Catheterization:  No results found for this or any previous visit.         Other:         ASSESSMENT & PLAN:    Principal Problem:    CHF (congestive heart failure)  Active Problems:    Abnormal nuclear stress test    Vitamin D deficiency    Hypertension    Arthritis    Acute HFrEF (heart failure with reduced ejection fraction)    Acute HFrEF  Echocardiogram shows EF of 29% with grade 2 diastolic dysfunction  Elevated proBNP, minimally elevated high-sensitivity troponin.  Can resume oral diuretics today.  EDP during cardiac cath was normal.    Sodium dropped from 140-132.  Repeat labs outpatient  Continue losartan, beta-blocker and Jardiance.  Plan will be to repeat echocardiogram in 3 months after maximally tolerated GDMT has been given.  Plan explained to the patient and he is in agreement  Cardiomyopathy is out of proportion to coronary disease.  I recommended cardiac rehab however patient is not interested    Coronary artery disease  Status post PCI with VELASQUEZ placement to RCA  Continue dual antiplatelet therapy and high intensity statin  Medical management for nonobstructive disease in the LAD.     Hypertension  Continue losartan and beta-blocker     Hyperlipidemia  LDL 38, HDL 45, triglyceride 93 and total cholesterol 101  Added statin.  He is already at goal with LDL     Overweight  BMI is 26  He weighs 166 pounds  Lifestyle modifications recommended to the patient.    Okay to discharge from cardiac standpoint.          Yaw Zavaleta MD  02/05/24  07:19 EST                  Electronically signed by Yaw Zavaleta MD at 02/05/24 1240       Alberto Miguel MD at 02/04/24 1053              [facility] MEDICINE SERVICE  DAILY PROGRESS NOTE    NAME: John  JAYDEN Morales  : 1965  MRN: [id number]      LOS: 1 day     PROVIDER OF SERVICE: Alberto Miguel MD    Chief Complaint: CHF (congestive heart failure)    Subjective:     Interval History:  History taken from: patient  Patient Complaints:  shortness of breath     History of Present Illness: John Morales is a 58 y.o. male with a previous medical history of HTN who presented to [facility] on 2024 with  shortness of breath worse with exertion and cold weather and bilateral lower extremity swelling for the last three months.  He saw his PCP today who was concerned for heart failure and sent him to the ED for further evaluation.  The patient reports that he is supposed to take Amlodipine and Lisinopril but stopped months ago.  He denies chest pain or palpitations.      In the ED, BNP is 2885, Troponin 19, 27. Chest xray shows pulmonary edema versus multifocal pneumonia.  He is afebrile,  hypertensive at 146/96 and tachycardic at 113 on exam.   EKG reviewed, sinus tachycardia with PVCs and probably left atrial enlargement, .  He was started on a Tridil drip and given Lasix, 40mg IV in the ED.  Hospitalist was consulted for further care and management.     24 seen in bed NAD, no new complaints, anxious to go home, GERRY RN  2/3/2024 patient seen and examined in bed no acute distress, underwent stress test and echo.  Pending results, discussed with RN.  Will discharge home if stress test and echo are normal.  24 patient seen and examined in bed no acute distress, no complaints, denies chest pain.  Vital signs stable, for cardiac cath today.  Patient is complaining of right ankle pain.  Per patient he was treated with Uloric in the past check uric acid.  Will obtain ankle x-ray.    Review of Systems  12 point ROS reviewed and negative except as mentioned above    Objective:     Vital Signs  Temp:  [97.6 °F (36.4 °C)-100.6 °F (38.1 °C)] 97.6 °F (36.4 °C)  Heart Rate:  [85-92] 90  Resp:   [12-20] 16  BP: (102-124)/(66-78) 124/78   Body mass index is 26.45 kg/m².      Physical Exam  Vitals and nursing note reviewed.   Constitutional:       Appearance: Normal appearance.   HENT:      Head: Normocephalic and atraumatic.      Nose: Nose normal.      Mouth/Throat:      Mouth: Mucous membranes are moist.   Eyes:      Extraocular Movements: Extraocular movements intact.      Pupils: Pupils are equal, round, and reactive to light.   Cardiovascular:      Rate and Rhythm: Regular rhythm. Tachycardia present. Occasional Extrasystoles are present.     Pulses: Normal pulses.      Heart sounds: Normal heart sounds.   Pulmonary:      Effort: Pulmonary effort is normal.      Breath sounds: Examination of the right-lower field reveals decreased breath sounds. Examination of the left-lower field reveals decreased breath sounds. Decreased breath sounds present.   Abdominal:      General: Bowel sounds are normal.      Palpations: Abdomen is soft.   Musculoskeletal:         General: Normal range of motion.      Cervical back: Normal range of motion.      Right lower le+ Edema present.      Left lower le+ Edema present.   Skin:     General: Skin is warm and dry.   Neurological:      General: No focal deficit present.      Mental Status: He is alert and oriented to person, place, and time. Mental status is at baseline.   Psychiatric:         Mood and Affect: Mood normal.         Behavior: Behavior normal. Behavior is agitated. Behavior is cooperative.      Scheduled Meds   allopurinol, 100 mg, Oral, Daily  carvedilol, 6.25 mg, Oral, BID With Meals  empagliflozin, 10 mg, Oral, Daily  furosemide, 40 mg, Intravenous, Q12H  losartan, 25 mg, Oral, Q24H       PRN Meds     acetaminophen **OR** acetaminophen **OR** acetaminophen    senna-docusate sodium **AND** polyethylene glycol **AND** bisacodyl **AND** bisacodyl    melatonin    nitroglycerin    ondansetron    [COMPLETED] Insert Peripheral IV **AND** sodium chloride    Infusions  nitroglycerin,  mcg/min, Last Rate: Stopped (02/02/24 0900)          Diagnostic Data    Results from last 7 days   Lab Units 02/04/24  0314 02/01/24  2232 02/01/24  1753   WBC 10*3/mm3 10.80   < > 11.80*   HEMOGLOBIN g/dL 15.6   < > 15.1   HEMATOCRIT % 47.0   < > 45.3   PLATELETS 10*3/mm3 170   < > 173   GLUCOSE mg/dL 109*   < > 91   CREATININE mg/dL 1.00   < > 0.89   BUN mg/dL 17   < > 18   SODIUM mmol/L 140   < > 142   POTASSIUM mmol/L 3.4*   < > 4.7   AST (SGOT) U/L  --   --  42*   ALT (SGPT) U/L  --   --  28   ALK PHOS U/L  --   --  84   BILIRUBIN mg/dL  --   --  0.7   ANION GAP mmol/L 13.0   < > 12.0    < > = values in this interval not displayed.       No radiology results for the last day      I reviewed the patient's new clinical results.    Assessment/Plan:     Active and Resolved Problems  Active Hospital Problems    Diagnosis  POA    **CHF (congestive heart failure) [I50.9]  Yes    Acute HFrEF (heart failure with reduced ejection fraction) [I50.21]  Yes    Abnormal nuclear stress test [R94.39]  Unknown    Hypertension [I10]  Yes    Arthritis [M19.90]  Yes    Vitamin D deficiency [E55.9]  Yes      Resolved Hospital Problems   No resolved problems to display.         Congestive Heart Failure-New Diagnosis  Essential Hypertension-Uncontrolled due to non compliance with medication  -BNP 2885  -Initial Troponin 19, 27  -EKG reviewed  -Chest xray reviewed, pulmonary edema noted  -Tridil drip started in ED for BP control, continue and wean as tolerated  -Restart home BP medications when verified  -Lasix 40mg IV given in ED, continue Q12H  -ECHO and stress test ordered  -Continuous cardiac monitoring  -Heart Failure Pathway initiated  Coreg 3.125mg BID  Jardiance 10mg daily  Daily weights  Strict I&O  Heart Failure Navigator Consult  -Cardiology consulted       DVT prophylaxis:  Mechanical DVT prophylaxis orders are present.         Code status is   Code Status and Medical Interventions:    Ordered at: 02/01/24 2036     Code Status (Patient has no pulse and is not breathing):    CPR (Attempt to Resuscitate)     Medical Interventions (Patient has pulse or is breathing):    Full Support       Plan for disposition:home in 1 days    Time: 30 minutes    Signature: Electronically signed by Alberto Miguel MD, 02/04/24, 10:53 EST.  [facility] Hospitalist Team    Electronically signed by Alberto Miguel MD at 02/04/24 1138       Yaw Zavaleta MD at 02/04/24 0723          Referring Provider: Alberto Miguel MD    Reason for follow-up: New onset HFrEF     Patient Care Team:  Nora Sterling MD as PCP - General (Family Medicine)  Giorgio Kennedy MD as Consulting Physician (Gastroenterology)      SUBJECTIVE  Seen after PCI.  Access site is benign.  Right IJ line can be pulled out.  Currently chest pain-free.     ROS  Review of all systems negative except as indicated.    Since I have last seen, the patient has been without any chest discomfort, shortness of breath, palpitations, dizziness or syncope.  Denies having any headache, abdominal pain, nausea, vomiting, diarrhea, constipation, loss of weight or loss of appetite.  Denies having any excessive bruising, hematuria or blood in the stool.  ROS      Personal History:    Past Medical History:   Diagnosis Date    Ankle fracture     right ankle    Arthritis     Gout    Back injury 07/01/2007    broke    CHF (congestive heart failure) 2/1/2024    Hypertension        Past Surgical History:   Procedure Laterality Date    ARM WOUND REPAIR / CLOSURE Left 2014    infection in bone    COLONOSCOPY N/A 9/26/2019    Procedure: COLONOSCOPY WITH POLYPECTOMY X 2,;  Surgeon: Sukhdev Silva MD;  Location: [facility] OR;  Service: Gastroenterology    MASS EXCISION N/A 9/26/2019    Procedure: Excision of perianal skin tags;  Surgeon: Robert Stinson MD;  Location: [facility] MAIN OR;  Service: General    SKIN TAG REMOVAL          Family History   Problem Relation Age of Onset    No Known Problems Mother     No Known Problems Father     No Known Problems Sister     No Known Problems Brother        Social History     Tobacco Use    Smoking status: Never    Smokeless tobacco: Never   Vaping Use    Vaping Use: Never used   Substance Use Topics    Alcohol use: No    Drug use: No        Home meds:  Prior to Admission medications    Medication Sig Start Date End Date Taking? Authorizing Provider   allopurinol (ZYLOPRIM) 100 MG tablet Take 1 tablet by mouth Daily. 6/22/19  Yes Gerda Almaraz MD   carvedilol (COREG) 6.25 MG tablet Take 1 tablet by mouth 2 (Two) Times a Day With Meals. 2/2/24  Yes Alberto Miguel MD   empagliflozin (JARDIANCE) 10 MG tablet tablet Take 1 tablet by mouth Daily. 2/3/24  Yes Alberto Miguel MD   furosemide (Lasix) 20 MG tablet Take 1 tablet by mouth 2 (Two) Times a Day. 2/2/24  Yes Alberto Miguel MD   lisinopril (PRINIVIL,ZESTRIL) 40 MG tablet Take 1 tablet by mouth Daily. 6/22/19  Yes ProviderGerda MD   losartan (Cozaar) 25 MG tablet Take 1 tablet by mouth Daily. 2/2/24   Alberto Miguel MD       Allergies:  Patient has no known allergies.    Scheduled Meds:allopurinol, 100 mg, Oral, Daily  carvedilol, 6.25 mg, Oral, BID With Meals  empagliflozin, 10 mg, Oral, Daily  furosemide, 40 mg, Intravenous, Q12H  losartan, 25 mg, Oral, Q24H      Continuous Infusions:nitroglycerin,  mcg/min, Last Rate: Stopped (02/02/24 0900)      PRN Meds:.  acetaminophen **OR** acetaminophen **OR** acetaminophen    senna-docusate sodium **AND** polyethylene glycol **AND** bisacodyl **AND** bisacodyl    melatonin    nitroglycerin    ondansetron    [COMPLETED] Insert Peripheral IV **AND** sodium chloride      OBJECTIVE    Vital Signs  Vitals:    02/03/24 2153 02/04/24 0104 02/04/24 0448 02/04/24 0500   BP: 106/74 118/76 115/69    BP Location: Left arm Left arm Left arm    Patient Position: Lying  "Lying Lying    Pulse: 88 92 85    Resp: 12 16 20    Temp: 99.7 °F (37.6 °C) 98.8 °F (37.1 °C) (!) 100.6 °F (38.1 °C)    TempSrc: Oral Oral Oral    SpO2: 92% 94% 95%    Weight:    76.6 kg (168 lb 14 oz)   Height:           Flowsheet Rows      Flowsheet Row First Filed Value   Admission Height 170.2 cm (67\") Documented at 02/01/2024 1722   Admission Weight 81.6 kg (180 lb) Documented at 02/01/2024 1722              Intake/Output Summary (Last 24 hours) at 2/4/2024 0724  Last data filed at 2/3/2024 1856  Gross per 24 hour   Intake 240 ml   Output 1150 ml   Net -910 ml          Telemetry: Sinus rhythm    Physical Exam:  The patient is alert, oriented and in no distress.  Vital signs as noted above.  Head and neck revealed no carotid bruits or jugular venous distention.  No thyromegaly or lymphadenopathy is present  Lungs clear.  No wheezing.  Breath sounds are normal bilaterally.  Heart normal first and second heart sounds.  No murmur. No precordial rub is present.  No gallop is present.  Abdomen soft and nontender.  No organomegaly is present.  Extremities with good peripheral pulses without any pedal edema.  Skin warm and dry.  Musculoskeletal system is grossly normal.  CNS grossly normal.       Results Review:  I have personally reviewed the results from the time of this admission to 2/4/2024 07:24 EST and agree with these findings:  []  Laboratory  []  Microbiology  []  Radiology  []  EKG/Telemetry   []  Cardiology/Vascular   []  Pathology  []  Old records  []  Other:    Most notable findings include:    Lab Results (last 24 hours)       Procedure Component Value Units Date/Time    Basic Metabolic Panel [[id number]]  (Abnormal) Collected: 02/04/24 0314    Specimen: Blood Updated: 02/04/24 0402     Glucose 109 mg/dL      BUN 17 mg/dL      Creatinine 1.00 mg/dL      Sodium 140 mmol/L      Potassium 3.4 mmol/L      Chloride 102 mmol/L      CO2 25.0 mmol/L      Calcium 9.0 mg/dL      BUN/Creatinine Ratio 17.0     Anion " Gap 13.0 mmol/L      eGFR 86.7 mL/min/1.73     Narrative:      GFR Normal >60  Chronic Kidney Disease <60  Kidney Failure <15      CBC & Differential [[id number]]  (Abnormal) Collected: 02/04/24 0314    Specimen: Blood Updated: 02/04/24 0338    Narrative:      The following orders were created for panel order CBC & Differential.  Procedure                               Abnormality         Status                     ---------                               -----------         ------                     CBC Auto Differential[[id number]]        Abnormal            Final result                 Please view results for these tests on the individual orders.    CBC Auto Differential [[id number]]  (Abnormal) Collected: 02/04/24 0314    Specimen: Blood Updated: 02/04/24 0338     WBC 10.80 10*3/mm3      RBC 5.24 10*6/mm3      Hemoglobin 15.6 g/dL      Hematocrit 47.0 %      MCV 89.8 fL      MCH 29.8 pg      MCHC 33.2 g/dL      RDW 13.3 %      RDW-SD 43.8 fl      MPV 10.2 fL      Platelets 170 10*3/mm3      Neutrophil % 72.4 %      Lymphocyte % 13.0 %      Monocyte % 13.6 %      Eosinophil % 0.6 %      Basophil % 0.4 %      Neutrophils, Absolute 7.80 10*3/mm3      Lymphocytes, Absolute 1.40 10*3/mm3      Monocytes, Absolute 1.50 10*3/mm3      Eosinophils, Absolute 0.10 10*3/mm3      Basophils, Absolute 0.00 10*3/mm3      nRBC 0.1 /100 WBC             Imaging Results (Last 24 Hours)       ** No results found for the last 24 hours. **            LAB RESULTS (LAST 7 DAYS)    CBC  Results from last 7 days   Lab Units 02/04/24  0314 02/03/24  0202 02/02/24  0447 02/01/24  2232 02/01/24  1753   WBC 10*3/mm3 10.80 10.30 9.40 9.80 11.80*   RBC 10*6/mm3 5.24 5.20 4.77 4.84 4.84   HEMOGLOBIN g/dL 15.6 15.4 14.4 14.3 15.1   HEMATOCRIT % 47.0 47.3 43.9 44.2 45.3   MCV fL 89.8 91.0 91.9 91.3 93.5   PLATELETS 10*3/mm3 170 164 154 176 173       BMP  Results from last 7 days   Lab Units 02/04/24  0314 02/03/24  0202 02/02/24  0918 02/01/24  2239  02/01/24  1753   SODIUM mmol/L 140 144  --  139 142   POTASSIUM mmol/L 3.4* 3.8  --  3.8 4.7   CHLORIDE mmol/L 102 104  --  104 108*   CO2 mmol/L 25.0 26.0  --  24.0 22.0   BUN mg/dL 17 19  --  16 18   CREATININE mg/dL 1.00 1.06  --  0.85 0.89   GLUCOSE mg/dL 109* 93  --  107* 91   MAGNESIUM mg/dL  --   --  1.9  --   --        CMP   Results from last 7 days   Lab Units 02/04/24  0314 02/03/24  0202 02/01/24 2232 02/01/24  1753   SODIUM mmol/L 140 144 139 142   POTASSIUM mmol/L 3.4* 3.8 3.8 4.7   CHLORIDE mmol/L 102 104 104 108*   CO2 mmol/L 25.0 26.0 24.0 22.0   BUN mg/dL 17 19 16 18   CREATININE mg/dL 1.00 1.06 0.85 0.89   GLUCOSE mg/dL 109* 93 107* 91   ALBUMIN g/dL  --   --   --  3.8   BILIRUBIN mg/dL  --   --   --  0.7   ALK PHOS U/L  --   --   --  84   AST (SGOT) U/L  --   --   --  42*   ALT (SGPT) U/L  --   --   --  28       BNP        TROPONIN  Results from last 7 days   Lab Units 02/01/24  2017   HSTROP T ng/L 27*       CoAg        Creatinine Clearance  Estimated Creatinine Clearance: 86.2 mL/min (by C-G formula based on SCr of 1 mg/dL).    ABG        Radiology  No radiology results for the last day      EKG  I personally viewed and interpreted the patient's EKG/Telemetry data:  ECG 12 Lead Chest Pain   Final Result   HEART RATE= 111  bpm   RR Interval= 540  ms   TN Interval= 165  ms   P Horizontal Axis= -4  deg   P Front Axis= 57  deg   QRSD Interval= 79  ms   QT Interval= 342  ms   QTcB= 465  ms   QRS Axis= 21  deg   T Wave Axis= 107  deg   - ABNORMAL ECG -   Sinus tachycardia   Paired ventricular premature complexes   Probable left atrial enlargement   Abnrm R prog, consider ASMI or lead placement   Nonspecific T abnormalities, lateral leads   When compared with ECG of 19-Sep-2019 15:25:50,   Significant rate increase   Significant repolarization change   Electronically Signed By: Hans Locke (Con) 02-Feb-2024 06:22:51   Date and Time of Study: 2024-02-01 17:08:30      SCANNED - TELEMETRY     Final  Result      SCANNED - TELEMETRY     Final Result      SCANNED - TELEMETRY     Final Result      SCANNED - TELEMETRY     Final Result      SCANNED - TELEMETRY     Final Result      SCANNED - TELEMETRY     Final Result      SCANNED - TELEMETRY     Final Result            Echocardiogram:    Results for orders placed during the hospital encounter of 02/01/24    Adult Transthoracic Echo Complete W/ Cont if Necessary Per Protocol    Interpretation Summary    Left ventricular systolic function is moderately decreased. Calculated left ventricular EF = 29% Left ventricular ejection fraction appears to be 26 - 30%.    The left ventricular cavity is severely dilated.    Left ventricular diastolic function is consistent with (grade II w/high LAP) pseudonormalization.    The left atrial cavity is dilated.    Estimated right ventricular systolic pressure from tricuspid regurgitation is normal (<35 mmHg).    No significant valvular abnormalities noted.        Stress Test:  Results for orders placed during the hospital encounter of 02/01/24    Stress Test With Myocardial Perfusion One Day    Interpretation Summary    Findings consistent with a normal ECG stress test.    Left ventricular ejection fraction is moderately reduced (Calculated EF = 27%).    Myocardial perfusion imaging indicates a large-sized, severe area of ischemia located in the inferior wall.    Impressions are consistent with a high risk study.         Cardiac Catheterization:  No results found for this or any previous visit.         Other:         ASSESSMENT & PLAN:    Principal Problem:    CHF (congestive heart failure)  Active Problems:    Abnormal nuclear stress test    Vitamin D deficiency    Hypertension    Arthritis    Acute HFrEF (heart failure with reduced ejection fraction)    Acute HFrEF  Echocardiogram shows EF of 29% with grade 2 diastolic dysfunction  Elevated proBNP, minimally elevated high-sensitivity troponin.  IV diuretics will be stopped.  Wedge  pressure and LVEDP are normal   Start oral Lasix tomorrow  Continue losartan, beta-blocker and Jardiance.  Plan will be to repeat echocardiogram in 3 months after maximally tolerated GDMT has been given.  Plan explained to the patient and he is in agreement  Cardiomyopathy is out of proportion to coronary disease.    Coronary artery disease  Status post PCI with VELASQUEZ placement to RCA  Started on aspirin and Brilinta  Start statin  Medical management for nonobstructive disease in the LAD     Hypertension  Well-controlled on losartan and beta-blocker     Hyperlipidemia  LDL 38, HDL 45, triglyceride 93 and total cholesterol 101  Start statin due to coronary artery disease     Overweight  BMI is 26  He weighs 166 pounds  Lifestyle modifications recommended to the patient.          Yaw Zavaleta MD  24  07:24 EST                  Electronically signed by Yaw Zavaleta MD at 24 1330       Consult Notes (last 48 hours)  Notes from 24 1411 through 24 1411   No notes of this type exist for this encounter.          Discharge Summary        Alberto Miguel MD at 24 1021                       [facility]  Discharge Summary    Date of Service: 24  Patient Name: John Morales  : 1965  MRN: [id number]    Date of Admission: 2024  Discharge Diagnosis: Congestive Heart Failure-New Diagnosis  Date of Discharge:  24  Primary Care Physician: Nora Sterling MD      Presenting Problem:   CHF (congestive heart failure) [I50.9]  Acute congestive heart failure, unspecified heart failure type [I50.9]  Acute HFrEF (heart failure with reduced ejection fraction) [I50.21]    Active and Resolved Hospital Problems:  Active Hospital Problems    Diagnosis POA    **CHF (congestive heart failure) [I50.9] Yes    Acute HFrEF (heart failure with reduced ejection fraction) [I50.21] Yes    Abnormal nuclear stress test [R94.39] Unknown    Hypertension [I10] Yes     Arthritis [M19.90] Yes    Vitamin D deficiency [E55.9] Yes      Resolved Hospital Problems   No resolved problems to display.       Congestive Heart Failure-New Diagnosis  Essential Hypertension-Uncontrolled due to non compliance with medication  -BNP 2885  -Initial Troponin 19, 27  -EKG reviewed  -Chest xray reviewed, pulmonary edema noted  -Tridil drip started in ED for BP control, continue and wean as tolerated  -Restart home BP medications when verified  -Lasix 40mg IV given in ED, continue Q12H  -ECHO and stress test ordered  -Continuous cardiac monitoring  -Heart Failure Pathway initiated  Coreg 3.125mg BID  Jardiance 10mg daily  Daily weights  Strict I&O  Heart Failure Navigator Consult  -Cardiology consulted     Hospital Course     History of Present Illness: John Morales is a 58 y.o. male with a previous medical history of HTN who presented to [facility] on 2/1/2024 with  shortness of breath worse with exertion and cold weather and bilateral lower extremity swelling for the last three months.  He saw his PCP today who was concerned for heart failure and sent him to the ED for further evaluation.  The patient reports that he is supposed to take Amlodipine and Lisinopril but stopped months ago.  He denies chest pain or palpitations.      In the ED, BNP is 2885, Troponin 19, 27. Chest xray shows pulmonary edema versus multifocal pneumonia.  He is afebrile,  hypertensive at 146/96 and tachycardic at 113 on exam.   EKG reviewed, sinus tachycardia with PVCs and probably left atrial enlargement, .  He was started on a Tridil drip and given Lasix, 40mg IV in the ED.  Hospitalist was consulted for further care and management.     2/2/24 seen in bed NAD, no new complaints, anxious to go home, GERRY RN  2/3/2024 patient seen and examined in bed no acute distress, underwent stress test and echo.  Pending results, discussed with RN.  Will discharge home if stress test and echo are normal.  2/4/24  patient seen and examined in bed no acute distress, no complaints, denies chest pain.  Vital signs stable, for cardiac cath today.  Patient is complaining of right ankle pain.  Per patient he was treated with Uloric in the past check uric acid.  Will obtain ankle x-ray.  2/5/24 patient seen and examined in bed no acute distress, complaining of knee pain and ankle pain.  Patient does not want to see Ortho today.  Will discharge patient home.  Follow with PCP in a week.  Condition at discharge stable.    DISCHARGE Follow Up Recommendations for labs and diagnostics: Follow-up with PCP in a week.    Follow-up with Ortho in 2 weeks.    Reasons For Change In Medications and Indications for New Medications:   START taking:  aspirin   carvedilol (COREG)   colchicine   furosemide (Lasix)   Jardiance (empagliflozin)   losartan (Cozaar)   nitroglycerin (NITROSTAT)   potassium chloride   rosuvastatin (CRESTOR)   ticagrelor (BRILINTA)    STOP taking:  lisinopril 40 MG tablet (PRINIVIL,ZESTRIL)     Day of Discharge     Vital Signs:  Temp:  [97.5 °F (36.4 °C)-99 °F (37.2 °C)] 97.5 °F (36.4 °C)  Heart Rate:  [85-95] 92  Resp:  [14-18] 16  BP: (100-140)/(62-97) 131/89    Physical Exam Constitutional:       Appearance: Normal appearance.   HENT:      Head: Normocephalic and atraumatic.      Nose: Nose normal.      Mouth/Throat:      Mouth: Mucous membranes are moist.   Eyes:      Extraocular Movements: Extraocular movements intact.      Pupils: Pupils are equal, round, and reactive to light.   Cardiovascular:      Rate and Rhythm: Regular rhythm. Tachycardia present. Occasional Extrasystoles are present.     Pulses: Normal pulses.      Heart sounds: Normal heart sounds.   Pulmonary:      Effort: Pulmonary effort is normal.      Breath sounds: Examination of the right-lower field reveals decreased breath sounds. Examination of the left-lower field reveals decreased breath sounds. Decreased breath sounds present.   Abdominal:       General: Bowel sounds are normal.      Palpations: Abdomen is soft.   Musculoskeletal:         General: Normal range of motion.      Cervical back: Normal range of motion.      Right lower le+ Edema present.      Left lower le+ Edema present.   Skin:     General: Skin is warm and dry.   Neurological:      General: No focal deficit present.      Mental Status: He is alert and oriented to person, place, and time. Mental status is at baseline.   Psychiatric:         Mood and Affect: Mood normal.         Behavior: Behavior normal. Behavior is agitated. Behavior is cooperative.          Pertinent  and/or Most Recent Results     LAB RESULTS:      Lab 24  0225 24  1252 24  0314 24  02024  0447 24  1753   WBC 12.40*  --  10.80 10.30 9.40 9.80 11.80*   HEMOGLOBIN 14.8  --  15.6 15.4 14.4 14.3 15.1   HEMOGLOBIN, POC  --  15.6  16.0  --   --   --   --   --    HEMATOCRIT 45.9  --  47.0 47.3 43.9 44.2 45.3   HEMATOCRIT POC  --  46  47  --   --   --   --   --    PLATELETS 191  --  170 164 154 176 173   NEUTROS ABS 9.60*  --  7.80*  --   --   --  9.50*   LYMPHS ABS 1.00  --  1.40  --   --   --  1.00   MONOS ABS 1.80*  --  1.50*  --   --   --  1.20*   EOS ABS 0.00  --  0.10  --   --   --  0.10   MCV 91.5  --  89.8 91.0 91.9 91.3 93.5         Lab 24  0225 24  1153 24  0314 24  02024  0918 24  1753   SODIUM 132*  --  140 144  --  139 142   POTASSIUM 3.6  --  3.4* 3.8  --  3.8 4.7   CHLORIDE 96*  --  102 104  --  104 108*   CO2 24.0  --  25.0 26.0  --  24.0 22.0   ANION GAP 12.0  --  13.0 14.0  --  11.0 12.0   BUN 20  --  17 19  --  16 18   CREATININE 0.95  --  1.00 1.06  --  0.85 0.89   EGFR 92.2  --  86.7 80.8  --  100.1 98.7   GLUCOSE 98  --  109* 93  --  107* 91   CALCIUM 9.2  --  9.0 9.2  --  9.0 9.2   MAGNESIUM  --  2.0  --   --  1.9  --   --    HEMOGLOBIN A1C  --   --   --  5.50  --   --   --          Lab  02/01/24  1753   TOTAL PROTEIN 6.1   ALBUMIN 3.8   GLOBULIN 2.3   ALT (SGPT) 28   AST (SGOT) 42*   BILIRUBIN 0.7   ALK PHOS 84         Lab 02/05/24  0225 02/04/24  1153 02/01/24 2017 02/01/24  1753   PROBNP  --  1,503.0*  --  2,885.0*   HSTROP T 21 19 27* 19         Lab 02/02/24  0918   CHOLESTEROL 101   LDL CHOL 38   HDL CHOL 45   TRIGLYCERIDES 93             Brief Urine Lab Results       None          Microbiology Results (last 10 days)       ** No results found for the last 240 hours. **            XR Ankle 2 View Right    Result Date: 2/4/2024  Impression: Impression: Mild diffuse soft tissue swelling and extensive arterial vascular calcification. No acute bony abnormality or radiographic signs of osteomyelitis. Electronically Signed: Dane Laird DO  2/4/2024 3:59 PM EST  Workstation ID: [id number]    XR Chest 1 View    Result Date: 2/1/2024  Impression: 1.Ill-defined multifocal airspace infiltrates bilaterally with diffuse interstitial changes. The findings suggest developing atypical/viral infection or multifocal pneumonia. Changes of pulmonary edema could also be considered. Recommend correlation for signs or symptoms of acute infection and follow-up to ensure improvement/resolution. Electronically Signed: Yuval Blankenship MD  2/1/2024 5:49 PM EST  Workstation ID: [id number]             Results for orders placed during the hospital encounter of 02/01/24    Adult Transthoracic Echo Complete W/ Cont if Necessary Per Protocol    Interpretation Summary    Left ventricular systolic function is moderately decreased. Calculated left ventricular EF = 29% Left ventricular ejection fraction appears to be 26 - 30%.    The left ventricular cavity is severely dilated.    Left ventricular diastolic function is consistent with (grade II w/high LAP) pseudonormalization.    The left atrial cavity is dilated.    Estimated right ventricular systolic pressure from tricuspid regurgitation is normal (<35 mmHg).    No significant  valvular abnormalities noted.      Labs Pending at Discharge:      Procedures Performed  Procedure(s):  Right and Left Heart Cath         Consults:   Consults       Date and Time Order Name Status Description    2/1/2024  8:40 PM Inpatient Cardiology Consult      2/1/2024  7:21 PM Hospitalist (on-call MD unless specified)              ASSESSMENT & PLAN:     Principal Problem:    CHF (congestive heart failure)  Active Problems:    Abnormal nuclear stress test    Vitamin D deficiency    Hypertension    Arthritis    Acute HFrEF (heart failure with reduced ejection fraction)     Acute HFrEF  Echocardiogram shows EF of 29% with grade 2 diastolic dysfunction  Elevated proBNP, minimally elevated high-sensitivity troponin.  Can resume oral diuretics today.  EDP during cardiac cath was normal.    Sodium dropped from 140-132.  Repeat labs outpatient  Continue losartan, beta-blocker and Jardiance.  Plan will be to repeat echocardiogram in 3 months after maximally tolerated GDMT has been given.  Plan explained to the patient and he is in agreement  Cardiomyopathy is out of proportion to coronary disease.  I recommended cardiac rehab however patient is not interested     Coronary artery disease  Status post PCI with VELASQUEZ placement to RCA  Continue dual antiplatelet therapy and high intensity statin  Medical management for nonobstructive disease in the LAD.     Hypertension  Continue losartan and beta-blocker     Hyperlipidemia  LDL 38, HDL 45, triglyceride 93 and total cholesterol 101  Added statin.  He is already at goal with LDL     Overweight  BMI is 26  He weighs 166 pounds  Lifestyle modifications recommended to the patient.     Okay to discharge from cardiac standpoint.              Yaw Zavaleta MD  02/05/24  07:19 EST    Discharge Details        Discharge Medications        New Medications        Instructions Start Date   aspirin 81 MG EC tablet   81 mg, Oral, Daily   Start Date: February 6, 2024     carvedilol 6.25 MG  tablet  Commonly known as: COREG   6.25 mg, Oral, 2 Times Daily With Meals      colchicine 0.6 MG tablet   0.6 mg, Oral, Daily   Start Date: February 6, 2024     furosemide 20 MG tablet  Commonly known as: Lasix   20 mg, Oral, 2 Times Daily      Jardiance 10 MG tablet tablet  Generic drug: empagliflozin   10 mg, Oral, Daily      losartan 25 MG tablet  Commonly known as: Cozaar   25 mg, Oral, Daily      nitroglycerin 0.4 MG SL tablet  Commonly known as: NITROSTAT   0.4 mg, Sublingual, Every 5 Minutes PRN, Take no more than 3 doses in 15 minutes.      potassium chloride 10 MEQ CR tablet   10 mEq, Oral, 2 Times Daily      rosuvastatin 10 MG tablet  Commonly known as: CRESTOR   10 mg, Oral, Nightly      ticagrelor 90 MG tablet tablet  Commonly known as: BRILINTA   90 mg, Oral, 2 Times Daily             Continue These Medications        Instructions Start Date   allopurinol 100 MG tablet  Commonly known as: ZYLOPRIM   100 mg, Oral, Daily             Stop These Medications      lisinopril 40 MG tablet  Commonly known as: PRINIVIL,ZESTRIL              No Known Allergies      Discharge Disposition:   Home or Self Care    Diet:  Hospital:  Diet Order   Procedures    Diet: Cardiac Diets, Diabetic Diets; Healthy Heart (2-3 Na+); Consistent Carbohydrate; Texture: Regular Texture (IDDSI 7); Fluid Consistency: Thin (IDDSI 0)         Discharge Activity:         CODE STATUS:  Code Status and Medical Interventions:   Ordered at: 02/01/24 2036     Code Status (Patient has no pulse and is not breathing):    CPR (Attempt to Resuscitate)     Medical Interventions (Patient has pulse or is breathing):    Full Support         Future Appointments   Date Time Provider Department Center   2/20/2024  3:45 PM Yaw Zavaleta MD [facility]       Additional Instructions for the Follow-ups that You Need to Schedule       Ambulatory Referral to Cardiac Rehab   As directed      Discharge Follow-up with PCP   As directed       Currently Documented  PCP:    Nora Sterling MD    PCP Phone Number:    [phone]     Follow Up Details: pcp   Follow Up: 1 Week        Discharge Follow-up with Specialty: Cardiology; 1 Week   As directed      Specialty: Cardiology   Follow Up: 1 Week                Time spent on Discharge including face to face service:  >30 minutes    Signature: Electronically signed by Alberto Miguel MD, 02/05/24, 10:21 EST.  [facility] Hospitalist Team    Electronically signed by Alberto Miguel MD at 02/05/24 3911

## 2024-02-06 ENCOUNTER — TRANSITIONAL CARE MANAGEMENT TELEPHONE ENCOUNTER (OUTPATIENT)
Dept: CALL CENTER | Facility: HOSPITAL | Age: 59
End: 2024-02-06
Payer: COMMERCIAL

## 2024-02-06 NOTE — OUTREACH NOTE
Call Center TCM Note      Flowsheet Row Responses   [facility] facility patient discharged from? [facility]   Does the patient have one of the following disease processes/diagnoses(primary or secondary)? CHF   TCM attempt successful? No   Unsuccessful attempts Attempt 1        Pt wishes to follow up with another provider.      Jessica Morgan LPN    2/6/2024, 09:49 EST

## 2024-02-07 ENCOUNTER — TELEPHONE (OUTPATIENT)
Dept: CARDIOLOGY | Facility: CLINIC | Age: 59
End: 2024-02-07
Payer: COMMERCIAL

## 2024-02-07 NOTE — TELEPHONE ENCOUNTER
Caller: John Morales    Relationship: Self    Best call back number: [phone]    What form or medical record are you requesting: FMLA PAPER WORK & BACK TO WORK NOTE    Who is requesting this form or medical record from you:  [employer]    How would you like to receive the form or medical records (pick-up, mail, fax): FAX    DID YOU RECEIVE FAX FROM THEM 02/06/2024?              Timeframe paperwork needed: ASAP    Additional notes:PT WAS IN HOSPITAL FOR STENT AND ALSO DEVELOPED GOUT IN 1 ANKLE 1 KNEE        PT LET PT KNOW IF THIS  FMLA PAPER WORK  OR BACK TO WORK  NOTE HAS BEEN RECEIVED

## 2024-02-07 NOTE — TELEPHONE ENCOUNTER
Spoke to patient and explained that the fax was not received yet and [place] paperwork can take up to two weeks to complete. Also told patient that we can not cover gout and he would have to have his regular provider fill out paperwork for that. Work note will be ready for  tomorrow. Patient verbalized understanding.

## 2024-02-08 ENCOUNTER — TELEPHONE (OUTPATIENT)
Dept: CARDIAC REHAB | Facility: HOSPITAL | Age: 59
End: 2024-02-08
Payer: COMMERCIAL

## 2024-02-08 NOTE — PATIENT INSTRUCTIONS
Health Maintenance Due   Topic Date Due    COVID-19 Vaccine (1) Never done    Pneumococcal Vaccine 0-64 (1 of 2 - PCV) Never done    PROSTATE CANCER SCREENING  11/01/2020    ANNUAL PHYSICAL  07/02/2022    12 hour fast for labs 2/12/24

## 2024-02-08 NOTE — TELEPHONE ENCOUNTER
Per APRN, work note covering from 2/2/2024 to 2/11/2024 is permitted. Patient is not an established patient with our practice and we can not fill out FMLA forms. The gout is to be addressed by a PCP for them to decide on FMLA. Hospital follow up/new patient appointment is currently scheduled for 2/20/2024. Patient verbalized understanding. Work note placed at check in area of [address]

## 2024-02-09 ENCOUNTER — TELEPHONE (OUTPATIENT)
Dept: CARDIOLOGY | Facility: CLINIC | Age: 59
End: 2024-02-09
Payer: COMMERCIAL

## 2024-02-09 ENCOUNTER — HOSPITAL ENCOUNTER (OUTPATIENT)
Dept: GENERAL RADIOLOGY | Facility: HOSPITAL | Age: 59
Discharge: HOME | End: 2024-02-09
Admitting: PREVENTIVE MEDICINE
Payer: COMMERCIAL

## 2024-02-09 ENCOUNTER — OFFICE VISIT (OUTPATIENT)
Dept: FAMILY MEDICINE CLINIC | Facility: CLINIC | Age: 59
End: 2024-02-09
Payer: COMMERCIAL

## 2024-02-09 VITALS
TEMPERATURE: 97 F | SYSTOLIC BLOOD PRESSURE: 113 MMHG | OXYGEN SATURATION: 99 % | BODY MASS INDEX: 25.11 KG/M2 | WEIGHT: 160 LBS | HEART RATE: 77 BPM | HEIGHT: 67 IN | DIASTOLIC BLOOD PRESSURE: 76 MMHG

## 2024-02-09 DIAGNOSIS — Z95.5 S/P DRUG ELUTING CORONARY STENT PLACEMENT: ICD-10-CM

## 2024-02-09 DIAGNOSIS — I10 PRIMARY HYPERTENSION: Chronic | ICD-10-CM

## 2024-02-09 DIAGNOSIS — I50.21 ACUTE HFREF (HEART FAILURE WITH REDUCED EJECTION FRACTION): ICD-10-CM

## 2024-02-09 DIAGNOSIS — R05.2 SUBACUTE COUGH: ICD-10-CM

## 2024-02-09 DIAGNOSIS — I42.8 NICM (NONISCHEMIC CARDIOMYOPATHY): ICD-10-CM

## 2024-02-09 DIAGNOSIS — I25.110 CORONARY ARTERY DISEASE INVOLVING NATIVE CORONARY ARTERY OF NATIVE HEART WITH UNSTABLE ANGINA PECTORIS: ICD-10-CM

## 2024-02-09 DIAGNOSIS — M1A.09X0 CHRONIC GOUT OF MULTIPLE SITES, UNSPECIFIED CAUSE: ICD-10-CM

## 2024-02-09 DIAGNOSIS — R19.4 CHANGE IN BOWEL HABITS: ICD-10-CM

## 2024-02-09 DIAGNOSIS — Z12.5 SCREENING FOR PROSTATE CANCER: ICD-10-CM

## 2024-02-09 DIAGNOSIS — E78.49 OTHER HYPERLIPIDEMIA: ICD-10-CM

## 2024-02-09 DIAGNOSIS — Z00.01 ENCOUNTER FOR ANNUAL GENERAL MEDICAL EXAMINATION WITH ABNORMAL FINDINGS IN ADULT: Primary | ICD-10-CM

## 2024-02-09 DIAGNOSIS — E55.9 VITAMIN D DEFICIENCY: Chronic | ICD-10-CM

## 2024-02-09 PROCEDURE — 71046 X-RAY EXAM CHEST 2 VIEWS: CPT

## 2024-02-09 NOTE — PROGRESS NOTES
Subjective   John Morales is a 58 y.o. male presents for   Chief Complaint   Patient presents with    Hospital Follow Up Visit     Stent was placed last Thursday at [facility]-  Scheduled with Dr. Zavaleta next week.    Annual Exam    Transitional Care Management       Health Maintenance Due   Topic Date Due    COVID-19 Vaccine (1) Never done    Pneumococcal Vaccine 0-64 (1 of 2 - PCV) Never done    PROSTATE CANCER SCREENING  11/01/2020    ANNUAL PHYSICAL  07/02/2022   Patient is here for his annual wellness exam and has been advised to wear sunscreen and seatbelt.  Should be noted that several weeks ago the patient was at work breathing in cold air and he developed shortness of breath and was taken to [place] emergency room where he underwent heart catheterization and a stent was placed.  He stated that he feels great and would like to return back to work next week I did contact his cardiologist who agreed that he could return back to work next week as long as we thought he was able.  He states that he does have new gout in his right knee and right ankle but this is starting to improve with the colchicine and he feels as though he will be ready to go back on 13 February 2024.  We did fill out his family medical leave papers and he will follow-up with the cardiologist on February 20.  He has had no chest pain and breathing does seem to be improved.  He is taking an 81 mg aspirin along with his Brilinta and has not noted any bleeding anywhere.  He has developed persistent cough since he has been home review of the x-rays of the chest that were taken in the hospital did either show heart failure versus pneumonia.  We will repeat his chest x-ray before letting him return to work and we have advised that he start some doxycycline 100 mg twice a day for the coughing that is producing little to no sputum and again patient states he has not had a fever.      Vitals:    02/09/24 0952 02/09/24 0953   BP: 116/76 113/76   BP  "Location: Right arm Left arm   Patient Position: Sitting Sitting   Cuff Size: Adult Adult   Pulse: 74 77   Temp: 97 °F (36.1 °C)    TempSrc: Temporal    SpO2: 99%    Weight: 72.6 kg (160 lb)    Height: 170.2 cm (67\")      Body mass index is 25.06 kg/m².    Current Outpatient Medications on File Prior to Visit   Medication Sig Dispense Refill    allopurinol (ZYLOPRIM) 100 MG tablet Take 1 tablet by mouth Daily.  6    aspirin 81 MG EC tablet Take 1 tablet by mouth Daily. 30 tablet 0    carvedilol (COREG) 6.25 MG tablet Take 1 tablet by mouth 2 (Two) Times a Day With Meals. 60 tablet 6    colchicine 0.6 MG tablet Take 1 tablet by mouth Daily. 30 tablet 0    empagliflozin (JARDIANCE) 10 MG tablet tablet Take 1 tablet by mouth Daily. 30 tablet 3    furosemide (Lasix) 20 MG tablet Take 1 tablet by mouth 2 (Two) Times a Day. 60 tablet 0    losartan (Cozaar) 25 MG tablet Take 1 tablet by mouth Daily. 30 tablet 0    nitroglycerin (NITROSTAT) 0.4 MG SL tablet Place 1 tablet under the tongue Every 5 (Five) Minutes As Needed for Chest Pain (Systolic BP Greater Than 100). Take no more than 3 doses in 15 minutes. 25 tablet 12    potassium chloride 10 MEQ CR tablet Take 1 tablet by mouth 2 (Two) Times a Day. 60 tablet 0    rosuvastatin (CRESTOR) 10 MG tablet Take 1 tablet by mouth Every Night. 90 tablet 0    ticagrelor (BRILINTA) 90 MG tablet tablet Take 1 tablet by mouth 2 (Two) Times a Day. 60 tablet 3     No current facility-administered medications on file prior to visit.       The following portions of the patient's history were reviewed and updated as appropriate: allergies, current medications, past family history, past medical history, past social history, past surgical history, and problem list.    Review of Systems   Constitutional: Negative.    HENT: Negative.     Eyes: Negative.    Respiratory:  Positive for shortness of breath.    Cardiovascular: Negative.    Gastrointestinal: Negative.    Endocrine: Negative.  "   Genitourinary: Negative.    Musculoskeletal: Negative.    Skin: Negative.    Allergic/Immunologic: Negative.  Negative for environmental allergies.   Neurological: Negative.    Hematological: Negative.    Psychiatric/Behavioral: Negative.         Objective   Physical Exam  Vitals reviewed.   Constitutional:       General: He is not in acute distress.     Appearance: Normal appearance. He is well-developed. He is not ill-appearing or toxic-appearing.   HENT:      Head: Normocephalic and atraumatic.      Right Ear: Tympanic membrane, ear canal and external ear normal.      Left Ear: Tympanic membrane, ear canal and external ear normal.      Nose: Nose normal.   Eyes:      Extraocular Movements: Extraocular movements intact.      Conjunctiva/sclera: Conjunctivae normal.      Pupils: Pupils are equal, round, and reactive to light.   Cardiovascular:      Rate and Rhythm: Normal rate and regular rhythm.      Heart sounds: Normal heart sounds.   Pulmonary:      Effort: Pulmonary effort is normal.      Breath sounds: Wheezing present.   Abdominal:      General: Bowel sounds are normal. There is no distension.      Palpations: Abdomen is soft. There is no mass.      Tenderness: There is no abdominal tenderness.   Musculoskeletal:         General: Normal range of motion.      Cervical back: Neck supple.      Right knee: Swelling and erythema present.      Right ankle: Swelling present.      Comments: The patient does have some erythema and some swelling of the right knee and the right ankle. He states that there is some swelling on the dorsum laterally of the right foot as well from gout. The left pretibial area is free of edema and there is no joint swelling on the left side.   Skin:     General: Skin is warm.   Neurological:      General: No focal deficit present.      Mental Status: He is alert and oriented to person, place, and time.   Psychiatric:         Mood and Affect: Mood normal.         Behavior: Behavior normal.        PHQ-9 Total Score: 0    Assessment & Plan   Diagnoses and all orders for this visit:    1. Encounter for annual general medical examination with abnormal findings in adult (Primary)    2. Screening for prostate cancer  -     PSA Screen    3. Primary hypertension  -     CBC Auto Differential  -     Comprehensive Metabolic Panel    4. Vitamin D deficiency  -     Vitamin D,25-Hydroxy    5. Change in bowel habits  -     TSH Rfx On Abnormal To Free T4; Future    6. Acute HFrEF (heart failure with reduced ejection fraction)    7. Coronary artery disease involving native coronary artery of native heart with unstable angina pectoris    8. NICM (nonischemic cardiomyopathy)    9. S/P drug eluting coronary stent placement    10. Other hyperlipidemia    11. Chronic gout of multiple sites, unspecified cause  -     Uric Acid    12. Subacute cough  -     XR Chest PA & Lateral        1. Gout.  I will send a note to Dr. Amie Zavaleta stating that if his gout is okay, he can go back to work on 02/13/2024.    2. Annual wellness exam.  His blood pressure is well controlled. He was advised to wear sunscreen and seatbelt. He will return for fasting labs on 02/12/2025 for prostate cancer screening.    3. Subacute cough.  His chest on the right side sounds congested. I will order a chest x-ray. If he gets a fever or increasing shortness of breath over the weekend, he will call me.    4. Acute heart failure with reduced ejection fraction.  He will follow up with his cardiologist, Dr. Amie Zavaleta, on 02/20/2024.    Follow-up  The patient will follow up in 3 months, sooner if he has any problems.    Patient Instructions     Health Maintenance Due   Topic Date Due    COVID-19 Vaccine (1) Never done    Pneumococcal Vaccine 0-64 (1 of 2 - PCV) Never done    PROSTATE CANCER SCREENING  11/01/2020    ANNUAL PHYSICAL  07/02/2022    12 hour fast for labs 2/12/24       Transcribed from ambient dictation for Nora Sterling MD by Yolanda  Tim.  02/09/24   11:30 EST    Patient or patient representative verbalized consent to the visit recording.  I have personally performed the services described in this document as transcribed by the above individual, and it is both accurate and complete.

## 2024-02-10 NOTE — PROGRESS NOTES
Chest x-ray looks to have cleared so it should be advised that you would not need a second antibiotic.  It seems as though the lung findings initially were due to the congestive heart failure only and not to any pneumonia.  Labs will be done on Monday and hopefully you will return to work on Tuesday give us a call if you have any questions or concerns

## 2024-02-11 ENCOUNTER — TELEPHONE (OUTPATIENT)
Dept: FAMILY MEDICINE CLINIC | Facility: CLINIC | Age: 59
End: 2024-02-11
Payer: COMMERCIAL

## 2024-02-12 ENCOUNTER — CLINICAL SUPPORT (OUTPATIENT)
Dept: FAMILY MEDICINE CLINIC | Facility: CLINIC | Age: 59
End: 2024-02-12
Payer: COMMERCIAL

## 2024-02-12 PROCEDURE — 36415 COLL VENOUS BLD VENIPUNCTURE: CPT | Performed by: PREVENTIVE MEDICINE

## 2024-02-13 ENCOUNTER — CLINICAL SUPPORT (OUTPATIENT)
Dept: FAMILY MEDICINE CLINIC | Facility: CLINIC | Age: 59
End: 2024-02-13
Payer: COMMERCIAL

## 2024-02-13 ENCOUNTER — TELEPHONE (OUTPATIENT)
Dept: CARDIOLOGY | Facility: CLINIC | Age: 59
End: 2024-02-13
Payer: COMMERCIAL

## 2024-02-13 DIAGNOSIS — R19.4 CHANGE IN BOWEL HABITS: ICD-10-CM

## 2024-02-13 LAB
25(OH)D3 SERPL-MCNC: 8.8 NG/ML (ref 30–100)
ALBUMIN SERPL-MCNC: 4 G/DL (ref 3.5–5.2)
ALBUMIN/GLOB SERPL: 1.3 G/DL
ALP SERPL-CCNC: 87 U/L (ref 39–117)
ALT SERPL W P-5'-P-CCNC: 20 U/L (ref 1–41)
ANION GAP SERPL CALCULATED.3IONS-SCNC: 14.2 MMOL/L (ref 5–15)
AST SERPL-CCNC: 30 U/L (ref 1–40)
BASOPHILS # BLD AUTO: 0.04 10*3/MM3 (ref 0–0.2)
BASOPHILS NFR BLD AUTO: 0.4 % (ref 0–1.5)
BILIRUB SERPL-MCNC: 0.8 MG/DL (ref 0–1.2)
BUN SERPL-MCNC: 14 MG/DL (ref 6–20)
BUN/CREAT SERPL: 11.7 (ref 7–25)
CALCIUM SPEC-SCNC: 9.7 MG/DL (ref 8.6–10.5)
CHLORIDE SERPL-SCNC: 102 MMOL/L (ref 98–107)
CO2 SERPL-SCNC: 21.8 MMOL/L (ref 22–29)
CREAT SERPL-MCNC: 1.2 MG/DL (ref 0.76–1.27)
DEPRECATED RDW RBC AUTO: 35.2 FL (ref 37–54)
EGFRCR SERPLBLD CKD-EPI 2021: 69.7 ML/MIN/1.73
EOSINOPHIL # BLD AUTO: 0.15 10*3/MM3 (ref 0–0.4)
EOSINOPHIL NFR BLD AUTO: 1.6 % (ref 0.3–6.2)
ERYTHROCYTE [DISTWIDTH] IN BLOOD BY AUTOMATED COUNT: 11.7 % (ref 12.3–15.4)
GLOBULIN UR ELPH-MCNC: 3.1 GM/DL
GLUCOSE SERPL-MCNC: 86 MG/DL (ref 65–99)
HCT VFR BLD AUTO: 48.5 % (ref 37.5–51)
HGB BLD-MCNC: 16.5 G/DL (ref 13–17.7)
IMM GRANULOCYTES # BLD AUTO: 0.07 10*3/MM3 (ref 0–0.05)
IMM GRANULOCYTES NFR BLD AUTO: 0.8 % (ref 0–0.5)
LYMPHOCYTES # BLD AUTO: 1.71 10*3/MM3 (ref 0.7–3.1)
LYMPHOCYTES NFR BLD AUTO: 18.5 % (ref 19.6–45.3)
MCH RBC QN AUTO: 28.8 PG (ref 26.6–33)
MCHC RBC AUTO-ENTMCNC: 34 G/DL (ref 31.5–35.7)
MCV RBC AUTO: 84.6 FL (ref 79–97)
MONOCYTES # BLD AUTO: 0.8 10*3/MM3 (ref 0.1–0.9)
MONOCYTES NFR BLD AUTO: 8.7 % (ref 5–12)
NEUTROPHILS NFR BLD AUTO: 6.45 10*3/MM3 (ref 1.7–7)
NEUTROPHILS NFR BLD AUTO: 70 % (ref 42.7–76)
NRBC BLD AUTO-RTO: 0 /100 WBC (ref 0–0.2)
PLATELET # BLD AUTO: 318 10*3/MM3 (ref 140–450)
PMV BLD AUTO: 11.8 FL (ref 6–12)
POTASSIUM SERPL-SCNC: 4.5 MMOL/L (ref 3.5–5.2)
PROT SERPL-MCNC: 7.1 G/DL (ref 6–8.5)
PSA SERPL-MCNC: 2.36 NG/ML (ref 0–4)
RBC # BLD AUTO: 5.73 10*6/MM3 (ref 4.14–5.8)
SODIUM SERPL-SCNC: 138 MMOL/L (ref 136–145)
TSH SERPL DL<=0.05 MIU/L-ACNC: 1.44 UIU/ML (ref 0.27–4.2)
URATE SERPL-MCNC: 4.6 MG/DL (ref 3.4–7)
WBC NRBC COR # BLD AUTO: 9.22 10*3/MM3 (ref 3.4–10.8)

## 2024-02-13 PROCEDURE — G0103 PSA SCREENING: HCPCS | Performed by: PREVENTIVE MEDICINE

## 2024-02-13 PROCEDURE — 36415 COLL VENOUS BLD VENIPUNCTURE: CPT | Performed by: PREVENTIVE MEDICINE

## 2024-02-13 PROCEDURE — 82306 VITAMIN D 25 HYDROXY: CPT | Performed by: PREVENTIVE MEDICINE

## 2024-02-13 PROCEDURE — 84550 ASSAY OF BLOOD/URIC ACID: CPT | Performed by: PREVENTIVE MEDICINE

## 2024-02-13 PROCEDURE — 84443 ASSAY THYROID STIM HORMONE: CPT | Performed by: PREVENTIVE MEDICINE

## 2024-02-13 PROCEDURE — 85025 COMPLETE CBC W/AUTO DIFF WBC: CPT | Performed by: PREVENTIVE MEDICINE

## 2024-02-13 PROCEDURE — 80053 COMPREHEN METABOLIC PANEL: CPT | Performed by: PREVENTIVE MEDICINE

## 2024-02-14 ENCOUNTER — TELEPHONE (OUTPATIENT)
Dept: FAMILY MEDICINE CLINIC | Facility: CLINIC | Age: 59
End: 2024-02-14
Payer: COMMERCIAL

## 2024-02-14 ENCOUNTER — READMISSION MANAGEMENT (OUTPATIENT)
Dept: CALL CENTER | Facility: HOSPITAL | Age: 59
End: 2024-02-14
Payer: COMMERCIAL

## 2024-02-15 ENCOUNTER — TELEPHONE (OUTPATIENT)
Dept: FAMILY MEDICINE CLINIC | Facility: CLINIC | Age: 59
End: 2024-02-15
Payer: COMMERCIAL

## 2024-02-15 NOTE — TELEPHONE ENCOUNTER
RELAY----- Message from Nora Sterling MD sent at 2/15/2024  7:41 AM EST -----  Vitamin D is quite low I would increase to 2000 units a day.  Call if any other questions or concerns

## 2024-02-17 NOTE — PROGRESS NOTES
Encounter Date:02/20/2024        Patient ID: John Morales is a 58 y.o. male.      Chief Complaint:      History of Present Illness  58 years old man with hypertension, hyperlipidemia, coronary artery disease, nonischemic dilated cardiomyopathy, HFrEF who presents to cardiology office for follow-up.  Echocardiogram showed EF of 29% for which she was started on GDMT.  He also had cardiac catheterization in February 2024 with PCI to RCA.  Cardiomyopathy was thought to be out of proportion to degree of coronary artery disease.  Today he returns with no specific complaints.  He is requesting a handicap parking form to be filled out.  He denies any chest pain or shortness of breath.  He reports compliance with his medications.    The following portions of the patient's history were reviewed and updated as appropriate: allergies, current medications, past family history, past medical history, past social history, past surgical history, and problem list.    Review of Systems   Constitutional: Positive for malaise/fatigue.   Cardiovascular:  Negative for chest pain, dyspnea on exertion, leg swelling and palpitations.   Respiratory:  Negative for cough and shortness of breath.    Gastrointestinal:  Negative for abdominal pain, nausea and vomiting.   Neurological:  Positive for numbness. Negative for dizziness, focal weakness, headaches and light-headedness.   All other systems reviewed and are negative.        Current Outpatient Medications:     allopurinol (ZYLOPRIM) 100 MG tablet, Take 1 tablet by mouth Daily., Disp: , Rfl: 6    aspirin 81 MG EC tablet, Take 1 tablet by mouth Daily., Disp: 30 tablet, Rfl: 0    carvedilol (COREG) 6.25 MG tablet, Take 1 tablet by mouth 2 (Two) Times a Day With Meals., Disp: 60 tablet, Rfl: 6    colchicine 0.6 MG tablet, Take 1 tablet by mouth Daily., Disp: 30 tablet, Rfl: 0    empagliflozin (JARDIANCE) 10 MG tablet tablet, Take 1 tablet by mouth Daily., Disp: 30 tablet, Rfl: 3     furosemide (Lasix) 20 MG tablet, Take 1 tablet by mouth 2 (Two) Times a Day., Disp: 60 tablet, Rfl: 0    losartan (Cozaar) 25 MG tablet, Take 1 tablet by mouth Daily., Disp: 30 tablet, Rfl: 0    nitroglycerin (NITROSTAT) 0.4 MG SL tablet, Place 1 tablet under the tongue Every 5 (Five) Minutes As Needed for Chest Pain (Systolic BP Greater Than 100). Take no more than 3 doses in 15 minutes., Disp: 25 tablet, Rfl: 12    potassium chloride 10 MEQ CR tablet, Take 1 tablet by mouth 2 (Two) Times a Day., Disp: 60 tablet, Rfl: 0    rosuvastatin (CRESTOR) 10 MG tablet, Take 1 tablet by mouth Every Night., Disp: 90 tablet, Rfl: 0    ticagrelor (BRILINTA) 90 MG tablet tablet, Take 1 tablet by mouth 2 (Two) Times a Day., Disp: 60 tablet, Rfl: 3    Current outpatient and discharge medications have been reconciled for the patient.  Reviewed by: Yaw Zavaleta MD       No Known Allergies    Family History   Problem Relation Age of Onset    No Known Problems Mother     No Known Problems Father     No Known Problems Sister     No Known Problems Brother        Past Surgical History:   Procedure Laterality Date    ARM WOUND REPAIR / CLOSURE Left 2014    infection in bone    CARDIAC CATHETERIZATION Right 2/4/2024    Procedure: Right and Left Heart Cath;  Surgeon: Yaw Zavaleta MD;  Location: [facility] CATH INVASIVE LOCATION;  Service: Cardiovascular;  Laterality: Right;    COLONOSCOPY N/A 9/26/2019    Procedure: COLONOSCOPY WITH POLYPECTOMY X 2,;  Surgeon: Sukhdev Silva MD;  Location: [facility] MAIN OR;  Service: Gastroenterology    MASS EXCISION N/A 9/26/2019    Procedure: Excision of perianal skin tags;  Surgeon: Robert Stinson MD;  Location: [facility] MAIN OR;  Service: General    SKIN TAG REMOVAL         Past Medical History:   Diagnosis Date    Ankle fracture     right ankle    Arthritis     Gout    Back injury 07/01/2007    broke    Other hyperlipidemia 02/09/2024       Family History   Problem Relation Age of Onset  "   No Known Problems Mother     No Known Problems Father     No Known Problems Sister     No Known Problems Brother        Social History     Socioeconomic History    Marital status: Single   Tobacco Use    Smoking status: Never    Smokeless tobacco: Never   Vaping Use    Vaping Use: Never used   Substance and Sexual Activity    Alcohol use: No    Drug use: No    Sexual activity: Defer               Objective:       Physical Exam    /69   Pulse 73   Ht 170.2 cm (67\")   Wt 74.4 kg (164 lb)   SpO2 97%   BMI 25.69 kg/m²   The patient is alert, oriented and in no distress.    Vital signs as noted above.    Head and neck revealed no carotid bruits or jugular venous distension.  No thyromegaly or lymphadenopathy is present.    Lungs clear.  No wheezing.  Breath sounds are normal bilaterally.    Heart normal first and second heart sounds.  No murmur..  No pericardial rub is present.  No gallop is present.    Abdomen soft and nontender.  No organomegaly is present.    Extremities revealed good peripheral pulses without any pedal edema.    Skin warm and dry.    Musculoskeletal system is grossly normal.    CNS grossly normal.           Diagnosis Plan   1. Acute HFrEF (heart failure with reduced ejection fraction)        2. Coronary artery disease involving native coronary artery of native heart with unstable angina pectoris        3. Primary hypertension        4. S/P drug eluting coronary stent placement        5. Mixed hyperlipidemia        LAB RESULTS (LAST 7 DAYS)    CBC          BMP          CMP             BNP        TROPONIN        CoAg        Creatinine Clearance  Estimated Creatinine Clearance: 69.8 mL/min (by C-G formula based on SCr of 1.2 mg/dL).    ABG        Radiology  No radiology results for the last day    EKG  Procedures    Stress test  Results for orders placed during the hospital encounter of 02/01/24    Stress Test With Myocardial Perfusion One Day    Interpretation Summary    Findings " consistent with a normal ECG stress test.    Left ventricular ejection fraction is moderately reduced (Calculated EF = 27%).    Myocardial perfusion imaging indicates a large-sized, severe area of ischemia located in the inferior wall.    Impressions are consistent with a high risk study.      Echocardiogram  Results for orders placed during the hospital encounter of 02/01/24    Adult Transthoracic Echo Complete W/ Cont if Necessary Per Protocol    Interpretation Summary    Left ventricular systolic function is moderately decreased. Calculated left ventricular EF = 29% Left ventricular ejection fraction appears to be 26 - 30%.    The left ventricular cavity is severely dilated.    Left ventricular diastolic function is consistent with (grade II w/high LAP) pseudonormalization.    The left atrial cavity is dilated.    Estimated right ventricular systolic pressure from tricuspid regurgitation is normal (<35 mmHg).    No significant valvular abnormalities noted.      Cardiac catheterization  Results for orders placed during the hospital encounter of 02/01/24    Cardiac Catheterization/Vascular Study    Conclusion  OPERATORS  Yaw Zavaleta M.D. (Attending Cardiologist)      PROCEDURE PERFORMED  Ultrasound guided vascular access  Right heart Catheterization  Left Heart Catheterization  Coronary Angiogram 82214  PCI with VELASQUEZ placement RCA 80835  IVUS of RCA 03127  Moderate Sedation 45 minutes    INDICATIONS FOR PROCEDURE  58-year-old man who presented with HFrEF exacerbation and was noted to have minimally elevated high-sensitivity troponin and abnormal nuclear stress test in the inferior wall.  He also has pulmonary hypertension.  After discussing the risk and benefit of the procedure he was brought in for right and left heart cath.    PROCEDURE IN DETAIL  Informed consent was obtained from the patient after explaining the risks, benefits, and alternative options of the procedure. After obtaining informed consent, the  patient was brought to the cath lab and was prepped in a sterile fashion. Lidocaine 1% was used for local anesthesia into the right IJ access site. Right IJ vein was accessed using the micropuncture needle under ultrasound guidance and micropuncture wire advanced under flouroscopy. A 7 Irish vascular sheath was put into place percutaneously over guide-wire. Guide wires were removed. A 7Fr swan yari catheter was advanced to wedge position. RA, RV and PA and wedge pressures were recorded. PA sat and arterial sats recorded and the swan was subsequently removed in its entirety. Next, The right radial artery was accessed with an angiocath needle under ultrasound guidance. A 6F slendersheath was inserted successfully.  Afterwards, 6F JR4 diagnostic catheter was advanced over a wire into the ascending aorta and was used to cross the AV and obtain LV pressures.  Gradient across the AV measured via pullback technique.  Next, 6F JL3.5 and JR4 catheters were used to engage the ostia of the left main and RCA respectively. Images of the right and left coronary systems were obtained.    CORONARY ANGIOGRAM FINDINGS:  Dominance: Right    #Left main: Left main is a large vessel Which gives rise to the LAD left circumflex artery.  Left main is angiographically free from any significant disease.    #Left Anterior Descending Artery: LAD is a large caliber vessel which gives rise to several septal perforators and several diagonal branches.  Mid LAD has tandem 50% stenosis.    #Left Circumflex: The LCx is a large, non-dominant vessel which gives rise to OM branches.  Mid left circumflex has 40% stenosis, OM1 has 50% stenosis.    #Right Coronary Artery: The RCA is a large, dominant vessel which gives rise to several small caliber branches and the PDA and PLV.  Distal RCA had 90% focal stenosis.  SASCHA-3 flow.  Lesion length 10 mm.    Percutaneous coronary intervention  100 units/kg of heparin was administered and ACT of more than 250 was  documented.  I used a 6 Kazakh JR4 guide catheter to engage the ostium of right coronary artery.  A 0.014 run-through wire was then advanced past the lesion into the distal RCA.  I then advanced intravascular ultrasound catheter and performed a slow manual pullback from distal to mid RCA.  Reference vessel was 3 mm and at the lesion the vessel measured 1.2 mm.  Noncalcific fibro atherosclerotic 90% lesion was confirmed with IVUS visualization.  I then placed a 2.75 x 18 mm Xience don point stent which was postdilated with 3 x 15 mm noncompliant balloon at 16 tracy.  Final angiography showed 0% stenosis in the RCA with SASCHA-3 flow.    The catheters were exchanged over a wire and subsequently removed. The patient tolerated the procedure well without any complications. The pictures were reviewed at the end of the procedure. A TR band was applied.      [facility] HEMODYNAMICS:  LVp 98/7, 11 mmHg  AOp 95/69, 79 mmHg  No significant gradient on pullback.  LV gram was not performed due to recent echocardiogram on file.    [facility] HEMODYNAMICS:  RA 7/5, 5 mmHg  RV 31/4, 7 mmHg  PA 33/12, 22 mmHg  PCW 16/11, 12 mmHg  AO Sat 97%  PA Sat 70%    Jesus CO 4.37 liters per minute    Jesus CI 2.32 liters per minute per meter square    SVR: 1335 dynes-sec/cm5 (normal 700-1600)  PVR: 183 dynes-sec/cm5 (normal )    ESTIMATED BLOOD LOSS:  10 ml    COMPLICATIONS:  None    PROCEDURE DATA:  Contrast Used:40 cc  Fluoro Time: 3.12 mins  Sedation Time:    IMPRESSIONS  IVUS guided PCI to distal RCA with VELASQUEZ placement  LVEDP and wedge pressure are normal  Nonischemic cardiomyopathy (cardiomyopathy out of proportion to coronary disease)  Mild pulmonary hypertension    RECOMMENDATIONS  -- Start dual antiplatelet therapy and high intensity statin  -- Uptitrate GDMT for cardiomyopathy  --Risk factor and lifestyle modifications  -- Stop IV diuretics and switch to oral    Electronically signed by Yaw Zavaleta MD, 02/04/24, 1:26 PM EST.          Assessment  and Plan       Diagnoses and all orders for this visit:    1. Acute HFrEF (heart failure with reduced ejection fraction) (Primary)    2. Coronary artery disease involving native coronary artery of native heart with unstable angina pectoris    3. Primary hypertension    4. S/P drug eluting coronary stent placement    5. Mixed hyperlipidemia         Acute HFrEF  Echocardiogram shows EF of 29% with grade 2 diastolic dysfunction  Elevated proBNP, minimally elevated high-sensitivity troponin.  Continue low-dose diuretics.  EDP during cardiac cath was normal.    Continue losartan, beta-blocker and Jardiance.  Plan will be to repeat echocardiogram in 3 months after maximally tolerated GDMT has been given.  Repeat echocardiogram will be done in May 2024  Plan explained to the patient and he is in agreement  Cardiomyopathy is out of proportion to coronary disease.  I recommended cardiac rehab however patient is not interested.  I have provided him with cardiac diet instructions today.     Coronary artery disease  Status post PCI with VELASQUEZ placement to RCA  Continue dual antiplatelet therapy and high intensity statin  Medical management for nonobstructive disease in the LAD.     Hypertension  Continue losartan and beta-blocker  Blood pressure and heart rate are well-controlled     Hyperlipidemia  LDL 38, HDL 45, triglyceride 93 and total cholesterol 101  Added statin.  He is already at goal with LDL     Overweight  BMI is 25.69.  He weighs 164 pounds  Lifestyle modifications and healthy cardiovascular habits discussed with the patient.

## 2024-02-20 ENCOUNTER — OFFICE VISIT (OUTPATIENT)
Dept: CARDIOLOGY | Facility: CLINIC | Age: 59
End: 2024-02-20
Payer: COMMERCIAL

## 2024-02-20 VITALS
HEART RATE: 73 BPM | HEIGHT: 67 IN | OXYGEN SATURATION: 97 % | WEIGHT: 164 LBS | DIASTOLIC BLOOD PRESSURE: 69 MMHG | SYSTOLIC BLOOD PRESSURE: 112 MMHG | BODY MASS INDEX: 25.74 KG/M2

## 2024-02-20 DIAGNOSIS — Z95.5 S/P DRUG ELUTING CORONARY STENT PLACEMENT: ICD-10-CM

## 2024-02-20 DIAGNOSIS — I10 PRIMARY HYPERTENSION: Chronic | ICD-10-CM

## 2024-02-20 DIAGNOSIS — E78.2 MIXED HYPERLIPIDEMIA: ICD-10-CM

## 2024-02-20 DIAGNOSIS — I50.21 ACUTE HFREF (HEART FAILURE WITH REDUCED EJECTION FRACTION): Primary | ICD-10-CM

## 2024-02-20 DIAGNOSIS — I25.110 CORONARY ARTERY DISEASE INVOLVING NATIVE CORONARY ARTERY OF NATIVE HEART WITH UNSTABLE ANGINA PECTORIS: ICD-10-CM

## 2024-02-20 PROCEDURE — 99214 OFFICE O/P EST MOD 30 MIN: CPT | Performed by: INTERNAL MEDICINE

## 2024-02-20 RX ORDER — LOSARTAN POTASSIUM 25 MG/1
25 TABLET ORAL DAILY
Qty: 90 TABLET | Refills: 3 | Status: SHIPPED | OUTPATIENT
Start: 2024-02-20

## 2024-02-20 RX ORDER — FUROSEMIDE 20 MG/1
20 TABLET ORAL 2 TIMES DAILY
Qty: 180 TABLET | Refills: 3 | Status: SHIPPED | OUTPATIENT
Start: 2024-02-20

## 2024-02-20 RX ORDER — ROSUVASTATIN CALCIUM 10 MG/1
10 TABLET, COATED ORAL NIGHTLY
Qty: 90 TABLET | Refills: 3 | Status: SHIPPED | OUTPATIENT
Start: 2024-02-20

## 2024-02-20 RX ORDER — CARVEDILOL 6.25 MG/1
6.25 TABLET ORAL 2 TIMES DAILY WITH MEALS
Qty: 180 TABLET | Refills: 3 | Status: SHIPPED | OUTPATIENT
Start: 2024-02-20

## 2024-02-21 ENCOUNTER — READMISSION MANAGEMENT (OUTPATIENT)
Dept: CALL CENTER | Facility: HOSPITAL | Age: 59
End: 2024-02-21
Payer: COMMERCIAL

## 2024-02-21 PROBLEM — E66.3 OVERWEIGHT WITH BODY MASS INDEX (BMI) OF 25 TO 25.9 IN ADULT: Status: ACTIVE | Noted: 2024-02-21

## 2024-02-21 NOTE — PATIENT INSTRUCTIONS
Health Maintenance Due   Topic Date Due    COVID-19 Vaccine (1) Never done    Pneumococcal Vaccine 0-64 (1 of 2 - PCV) Never done

## 2024-02-21 NOTE — ASSESSMENT & PLAN NOTE
Patient's (There is no height or weight on file to calculate BMI.) indicates that they are overweight with health conditions that include hypertension, coronary heart disease, and dyslipidemias . Weight is unchanged. BMI is is above average; BMI management plan is completed. We discussed portion control and increasing exercise.

## 2024-02-21 NOTE — OUTREACH NOTE
CHF Week 3 Survey      Flowsheet Row Responses   [facility] facility patient discharged from? [facility]   Does the patient have one of the following disease processes/diagnoses(primary or secondary)? CHF   Week 3 attempt successful? No   Unsuccessful attempts Attempt 1            COLE BARRAGAN - Registered Nurse

## 2024-02-22 ENCOUNTER — OFFICE VISIT (OUTPATIENT)
Dept: FAMILY MEDICINE CLINIC | Facility: CLINIC | Age: 59
End: 2024-02-22
Payer: COMMERCIAL

## 2024-02-22 VITALS
DIASTOLIC BLOOD PRESSURE: 78 MMHG | SYSTOLIC BLOOD PRESSURE: 120 MMHG | HEIGHT: 67 IN | WEIGHT: 164 LBS | HEART RATE: 79 BPM | BODY MASS INDEX: 25.74 KG/M2 | TEMPERATURE: 98 F | OXYGEN SATURATION: 96 %

## 2024-02-22 DIAGNOSIS — E78.49 OTHER HYPERLIPIDEMIA: ICD-10-CM

## 2024-02-22 DIAGNOSIS — R05.2 SUBACUTE COUGH: ICD-10-CM

## 2024-02-22 DIAGNOSIS — M1A.09X0 CHRONIC GOUT OF MULTIPLE SITES, UNSPECIFIED CAUSE: ICD-10-CM

## 2024-02-22 DIAGNOSIS — R19.4 CHANGE IN BOWEL HABITS: ICD-10-CM

## 2024-02-22 DIAGNOSIS — I10 PRIMARY HYPERTENSION: Chronic | ICD-10-CM

## 2024-02-22 DIAGNOSIS — E66.3 OVERWEIGHT WITH BODY MASS INDEX (BMI) OF 25 TO 25.9 IN ADULT: ICD-10-CM

## 2024-02-22 DIAGNOSIS — I42.8 NICM (NONISCHEMIC CARDIOMYOPATHY): ICD-10-CM

## 2024-02-22 DIAGNOSIS — Z95.5 S/P DRUG ELUTING CORONARY STENT PLACEMENT: Primary | ICD-10-CM

## 2024-02-22 NOTE — PROGRESS NOTES
"Chief Complaint  Follow-up    Subjective        John Morales presents to [facility]  History of Present Illness    Go Morales is a 58-year-old male who is here today to follow up on status post drug-eluting coronary stent placement, hypertension, overweight, the body mass index of 25, hyperlipidemia, nonischemic cardiomyopathy, chronic gout, change in bowel habits, and subacute cough.    The patient stated that he returned to work and he was having some shortness of breath and that felt to be mainly in the chest. He had an x-ray and blood work, but there was no pneumonia. He had some shortness of breath when he was in the hospital. He still has a dry cough. He slept 7 hours last night.    He had a sex life before he had the stent placed, but currently he does not have any sex life due to erectile dysfunction. He saw his cardiologist, Dr. Zavaleta, on Tuesday, but he did not tell him about the ED. He is taking colchicine. He has not had to take any nitroglycerin.    He has a little bit of gout left in the knee, but it is not like it was. His change in bowel habits have been normal. He is not on a water pill. He is trying to watch his saturated fats.  Objective   Vital Signs:  /78 (BP Location: Left arm, Patient Position: Sitting, Cuff Size: Adult)   Pulse 79   Temp 98 °F (36.7 °C) (Temporal)   Ht 170.2 cm (67.01\")   Wt 74.4 kg (164 lb)   SpO2 96%   BMI 25.68 kg/m²   Estimated body mass index is 25.68 kg/m² as calculated from the following:    Height as of this encounter: 170.2 cm (67.01\").    Weight as of this encounter: 74.4 kg (164 lb).               Physical Exam  Constitutional:       Appearance: Normal appearance.   Cardiovascular:      Pulses: Normal pulses.      Heart sounds: Normal heart sounds.   Pulmonary:      Effort: Pulmonary effort is normal.      Breath sounds: Normal breath sounds.   Musculoskeletal:      Right lower leg: Edema (mild) present.      " Left lower leg: Edema (mild) present.   Skin:     General: Skin is warm and dry.   Neurological:      Mental Status: He is alert and oriented to person, place, and time.   Psychiatric:         Behavior: Behavior normal.        Result Review :                     Assessment and Plan     Diagnoses and all orders for this visit:    1. S/P drug eluting coronary stent placement (Primary)    2. Primary hypertension    3. Overweight with body mass index (BMI) of 25 to 25.9 in adult  Assessment & Plan:  Patient's (There is no height or weight on file to calculate BMI.) indicates that they are overweight with health conditions that include hypertension, coronary heart disease, and dyslipidemias . Weight is unchanged. BMI is is above average; BMI management plan is completed. We discussed portion control and increasing exercise.       4. Other hyperlipidemia    5. NICM (nonischemic cardiomyopathy)    6. Chronic gout of multiple sites, unspecified cause    7. Change in bowel habits    8. Subacute cough    Other orders  -     Cancel: Pneumococcal Conjugate Vaccine 20-Valent (PCV20)             Follow Up     No follow-ups on file.  Patient was given instructions and counseling regarding his condition or for health maintenance advice. Please see specific information pulled into the AVS if appropriate.       Transcribed from ambient dictation for Nora Sterling MD by Yeimi Al.  02/22/24   17:18 EST    Patient or patient representative verbalized consent to the visit recording.  I have personally performed the services described in this document as transcribed by the above individual, and it is both accurate and complete.

## 2024-02-23 ENCOUNTER — TELEPHONE (OUTPATIENT)
Dept: FAMILY MEDICINE CLINIC | Facility: CLINIC | Age: 59
End: 2024-02-23
Payer: COMMERCIAL

## 2024-02-23 RX ORDER — SILDENAFIL 50 MG/1
TABLET, FILM COATED ORAL
Qty: 12 TABLET | Refills: 3 | Status: SHIPPED | OUTPATIENT
Start: 2024-02-23

## 2024-02-26 ENCOUNTER — READMISSION MANAGEMENT (OUTPATIENT)
Dept: CALL CENTER | Facility: HOSPITAL | Age: 59
End: 2024-02-26
Payer: COMMERCIAL

## 2024-02-26 NOTE — OUTREACH NOTE
CHF Week 3 Survey      Flowsheet Row Responses   [facility] facility patient discharged from? [facility]   Does the patient have one of the following disease processes/diagnoses(primary or secondary)? CHF   Week 3 attempt successful? No   Unsuccessful attempts Attempt 2            Nancy CASTELLANOS - Registered Nurse

## 2024-02-27 ENCOUNTER — TELEPHONE (OUTPATIENT)
Dept: FAMILY MEDICINE CLINIC | Facility: CLINIC | Age: 59
End: 2024-02-27
Payer: COMMERCIAL

## 2024-02-27 NOTE — TELEPHONE ENCOUNTER
Caller: John Morales    Relationship: Self    Best call back number: [phone]     Do you know the name of the person who called: DOES NOT KNOW    What was the call regarding: PATIENT DOES NOT KNOW

## 2024-03-04 ENCOUNTER — TELEPHONE (OUTPATIENT)
Dept: FAMILY MEDICINE CLINIC | Facility: CLINIC | Age: 59
End: 2024-03-04
Payer: COMMERCIAL

## 2024-03-04 NOTE — TELEPHONE ENCOUNTER
Caller: John Morales    Relationship: Self    Best call back number: [phone]     What was the call regarding: PATIENT STATES HE IS NEEDING ALL OF THE NEW PRESCRIPTIONS THAT WERE PRESCRIBED IN THE HOSPITAL REFILLED. PATIENT DOESN'T HAVE THE NAMES OF THE MEDICATION    PLEASE CALL AND ADVISE

## 2024-03-04 NOTE — TELEPHONE ENCOUNTER
Caller: John Morales    Relationship: Self    Best call back number: [phone]     What was the call regarding:PATIENT STATES DR EVANS WAS SUPPOSED TO REACH OUT TO HIS CARDIOLOGIST. HE WOULD LIKE TO KNOW IF THIS WAS EVER DONE

## 2024-03-05 RX ORDER — ROSUVASTATIN CALCIUM 10 MG/1
10 TABLET, COATED ORAL NIGHTLY
Qty: 90 TABLET | Refills: 0 | Status: SHIPPED | OUTPATIENT
Start: 2024-03-05

## 2024-03-05 RX ORDER — FUROSEMIDE 20 MG/1
20 TABLET ORAL 2 TIMES DAILY
Qty: 180 TABLET | Refills: 0 | Status: SHIPPED | OUTPATIENT
Start: 2024-03-05

## 2024-03-05 RX ORDER — CARVEDILOL 6.25 MG/1
6.25 TABLET ORAL 2 TIMES DAILY WITH MEALS
Qty: 180 TABLET | Refills: 0 | Status: SHIPPED | OUTPATIENT
Start: 2024-03-05

## 2024-03-05 RX ORDER — POTASSIUM CHLORIDE 750 MG/1
10 TABLET, FILM COATED, EXTENDED RELEASE ORAL 2 TIMES DAILY
Qty: 60 TABLET | Refills: 0 | Status: SHIPPED | OUTPATIENT
Start: 2024-03-05

## 2024-03-05 RX ORDER — ALLOPURINOL 100 MG/1
100 TABLET ORAL DAILY
Qty: 30 TABLET | Refills: 0 | Status: SHIPPED | OUTPATIENT
Start: 2024-03-05

## 2024-03-05 RX ORDER — LOSARTAN POTASSIUM 25 MG/1
25 TABLET ORAL DAILY
Qty: 90 TABLET | Refills: 0 | Status: SHIPPED | OUTPATIENT
Start: 2024-03-05

## 2024-03-05 RX ORDER — COLCHICINE 0.6 MG/1
0.6 TABLET ORAL DAILY
Qty: 30 TABLET | Refills: 0 | Status: SHIPPED | OUTPATIENT
Start: 2024-03-05

## 2024-03-05 RX ORDER — ASPIRIN 81 MG/1
81 TABLET ORAL DAILY
Qty: 30 TABLET | Refills: 0 | Status: SHIPPED | OUTPATIENT
Start: 2024-03-05

## 2024-03-05 NOTE — TELEPHONE ENCOUNTER
PATIENT STOPPED BY AND INQUIRED PHONE NOTES FROM YESTERDAY. ADVISED PATIENT HE NEEDED TO CONTACT HIS CARDIOLOGIST AND THAT  WOULD PROVIDE 1WK SUPPLY OF MEDS EXCEPT TICAGRELOR UNTIL HE COULD GET IN TOUCH WITH CARDIOLOGIST.   Records received from both ENT as well as former PCP do not include immunization record.  Please request please request copy of immunization certificate from mother.

## 2024-03-05 NOTE — TELEPHONE ENCOUNTER
PATIENT STOPPED BY AND INQUIRED PHONE NOTES FROM YESTERDAY. ADVISED PATIENT HE NEEDED TO CONTACT HIS CARDIOLOGIST AND THAT  WOULD PROVIDE 1WK SUPPLY OF MEDS EXCEPT TICAGRELOR UNTIL HE COULD GET IN TOUCH WITH CARDIOLOGIST.

## 2024-03-05 NOTE — TELEPHONE ENCOUNTER
Rx Refill Note  Requested Prescriptions     Pending Prescriptions Disp Refills    rosuvastatin (CRESTOR) 10 MG tablet 90 tablet 0     Sig: Take 1 tablet by mouth Every Night.    potassium chloride 10 MEQ CR tablet 60 tablet 0     Sig: Take 1 tablet by mouth 2 (Two) Times a Day.    losartan (Cozaar) 25 MG tablet 90 tablet 0     Sig: Take 1 tablet by mouth Daily.    furosemide (Lasix) 20 MG tablet 180 tablet 0     Sig: Take 1 tablet by mouth 2 (Two) Times a Day.    empagliflozin (JARDIANCE) 10 MG tablet tablet 90 tablet 0     Sig: Take 1 tablet by mouth Daily.    colchicine 0.6 MG tablet 30 tablet 0     Sig: Take 1 tablet by mouth Daily.    carvedilol (COREG) 6.25 MG tablet 180 tablet 0     Sig: Take 1 tablet by mouth 2 (Two) Times a Day With Meals.    aspirin 81 MG EC tablet 30 tablet 0     Sig: Take 1 tablet by mouth Daily.    allopurinol (ZYLOPRIM) 100 MG tablet 30 tablet 0     Sig: Take 1 tablet by mouth Daily.      Last office visit with prescribing clinician: 2/22/2024   Last telemedicine visit with prescribing clinician: Visit date not found   Next office visit with prescribing clinician: 5/9/2024                         Would you like a call back once the refill request has been completed: [] Yes [] No    If the office needs to give you a call back, can they leave a voicemail: [] Yes [] No    Elvira Gaming MA  03/05/24, 13:23 EST

## 2024-04-05 RX ORDER — COLCHICINE 0.6 MG/1
0.6 TABLET ORAL DAILY
Qty: 30 TABLET | Refills: 0 | Status: SHIPPED | OUTPATIENT
Start: 2024-04-05

## 2024-04-05 RX ORDER — ROSUVASTATIN CALCIUM 10 MG/1
10 TABLET, COATED ORAL NIGHTLY
Qty: 90 TABLET | Refills: 0 | Status: SHIPPED | OUTPATIENT
Start: 2024-04-05

## 2024-04-05 RX ORDER — ASPIRIN 81 MG/1
81 TABLET ORAL DAILY
Qty: 30 TABLET | Refills: 0 | Status: SHIPPED | OUTPATIENT
Start: 2024-04-05

## 2024-04-05 RX ORDER — FUROSEMIDE 20 MG/1
20 TABLET ORAL 2 TIMES DAILY
Qty: 180 TABLET | Refills: 0 | Status: SHIPPED | OUTPATIENT
Start: 2024-04-05

## 2024-04-05 RX ORDER — SILDENAFIL 50 MG/1
TABLET, FILM COATED ORAL
Qty: 12 TABLET | Refills: 3 | Status: SHIPPED | OUTPATIENT
Start: 2024-04-05

## 2024-04-05 RX ORDER — LOSARTAN POTASSIUM 25 MG/1
25 TABLET ORAL DAILY
Qty: 90 TABLET | Refills: 0 | Status: SHIPPED | OUTPATIENT
Start: 2024-04-05

## 2024-04-05 RX ORDER — CARVEDILOL 6.25 MG/1
6.25 TABLET ORAL 2 TIMES DAILY WITH MEALS
Qty: 180 TABLET | Refills: 0 | Status: SHIPPED | OUTPATIENT
Start: 2024-04-05

## 2024-04-05 RX ORDER — POTASSIUM CHLORIDE 750 MG/1
10 TABLET, FILM COATED, EXTENDED RELEASE ORAL 2 TIMES DAILY
Qty: 60 TABLET | Refills: 0 | Status: SHIPPED | OUTPATIENT
Start: 2024-04-05

## 2024-04-05 NOTE — TELEPHONE ENCOUNTER
Caller: John Morales    Relationship: Self    Best call back number: [phone]     Requested Prescriptions:   Requested Prescriptions     Pending Prescriptions Disp Refills    carvedilol (COREG) 6.25 MG tablet 180 tablet 0     Sig: Take 1 tablet by mouth 2 (Two) Times a Day With Meals.    aspirin 81 MG EC tablet 30 tablet 0     Sig: Take 1 tablet by mouth Daily.    colchicine 0.6 MG tablet 30 tablet 0     Sig: Take 1 tablet by mouth Daily.    empagliflozin (JARDIANCE) 10 MG tablet tablet 90 tablet 0     Sig: Take 1 tablet by mouth Daily.    furosemide (Lasix) 20 MG tablet 180 tablet 0     Sig: Take 1 tablet by mouth 2 (Two) Times a Day.    losartan (Cozaar) 25 MG tablet 90 tablet 0     Sig: Take 1 tablet by mouth Daily.    rosuvastatin (CRESTOR) 10 MG tablet 90 tablet 0     Sig: Take 1 tablet by mouth Every Night.    ticagrelor (BRILINTA) 90 MG tablet tablet 180 tablet 3     Sig: Take 1 tablet by mouth 2 (Two) Times a Day.    potassium chloride 10 MEQ CR tablet 60 tablet 0     Sig: Take 1 tablet by mouth 2 (Two) Times a Day.    sildenafil (Viagra) 50 MG tablet 12 tablet 3     Sig: One tablet as needed before sex and not to use if take nitrates        Pharmacy where request should be sent: [facility] [address] [place] [address] [id number] [facility] [address]  - [phone] [facility] [phone] FX     Last office visit with prescribing clinician: 2/22/2024   Last telemedicine visit with prescribing clinician: Visit date not found   Next office visit with prescribing clinician: 5/9/2024     Additional details provided by patient: PATIENT IS OUT OF MOST OF THESE     Does the patient have less than a 3 day supply:  [x] Yes  [] No    Would you like a call back once the refill request has been completed: [x] Yes [] No    If the office needs to give you a call back, can they leave a voicemail: [x] Yes [] No    CHAITANYA Rajan   04/05/24 15:04 EDT

## 2024-05-20 ENCOUNTER — HOSPITAL ENCOUNTER (OUTPATIENT)
Dept: CARDIOLOGY | Facility: HOSPITAL | Age: 59
Discharge: HOME | End: 2024-05-20
Admitting: INTERNAL MEDICINE
Payer: COMMERCIAL

## 2024-05-20 ENCOUNTER — TELEPHONE (OUTPATIENT)
Dept: FAMILY MEDICINE CLINIC | Facility: CLINIC | Age: 59
End: 2024-05-20
Payer: COMMERCIAL

## 2024-05-20 VITALS — WEIGHT: 164 LBS | BODY MASS INDEX: 25.74 KG/M2 | HEIGHT: 67 IN

## 2024-05-20 DIAGNOSIS — M1A.09X0 CHRONIC GOUT OF MULTIPLE SITES, UNSPECIFIED CAUSE: Primary | ICD-10-CM

## 2024-05-20 LAB
BH CV ECHO MEAS - ACS: 1.7 CM
BH CV ECHO MEAS - AI P1/2T: 669 MSEC
BH CV ECHO MEAS - AO MAX PG: 6.3 MMHG
BH CV ECHO MEAS - AO MEAN PG: 3.2 MMHG
BH CV ECHO MEAS - AO ROOT DIAM: 2.8 CM
BH CV ECHO MEAS - AO V2 MAX: 125.5 CM/SEC
BH CV ECHO MEAS - AO V2 VTI: 25.6 CM
BH CV ECHO MEAS - AVA(I,D): 2.22 CM2
BH CV ECHO MEAS - EDV(CUBED): 265.3 ML
BH CV ECHO MEAS - EDV(MOD-SP4): 195.3 ML
BH CV ECHO MEAS - EF(MOD-BP): 30 %
BH CV ECHO MEAS - EF(MOD-SP4): 26.3 %
BH CV ECHO MEAS - ESV(CUBED): 165.3 ML
BH CV ECHO MEAS - ESV(MOD-SP4): 144 ML
BH CV ECHO MEAS - FS: 14.6 %
BH CV ECHO MEAS - IVS/LVPW: 0.95 CM
BH CV ECHO MEAS - IVSD: 0.96 CM
BH CV ECHO MEAS - LA DIMENSION: 3.8 CM
BH CV ECHO MEAS - LV DIASTOLIC VOL/BSA (35-75): 105.1 CM2
BH CV ECHO MEAS - LV MASS(C)D: 271.7 GRAMS
BH CV ECHO MEAS - LV MAX PG: 1.48 MMHG
BH CV ECHO MEAS - LV MEAN PG: 0.82 MMHG
BH CV ECHO MEAS - LV SYSTOLIC VOL/BSA (12-30): 77.5 CM2
BH CV ECHO MEAS - LV V1 MAX: 60.9 CM/SEC
BH CV ECHO MEAS - LV V1 VTI: 14.6 CM
BH CV ECHO MEAS - LVIDD: 6.4 CM
BH CV ECHO MEAS - LVIDS: 5.5 CM
BH CV ECHO MEAS - LVOT AREA: 3.9 CM2
BH CV ECHO MEAS - LVOT DIAM: 2.23 CM
BH CV ECHO MEAS - LVPWD: 1.01 CM
BH CV ECHO MEAS - MR MAX PG: 145.7 MMHG
BH CV ECHO MEAS - MR MAX VEL: 603.1 CM/SEC
BH CV ECHO MEAS - MV A MAX VEL: 92.9 CM/SEC
BH CV ECHO MEAS - MV DEC SLOPE: 166.3 CM/SEC2
BH CV ECHO MEAS - MV DEC TIME: 0.32 SEC
BH CV ECHO MEAS - MV E MAX VEL: 53.2 CM/SEC
BH CV ECHO MEAS - MV E/A: 0.57
BH CV ECHO MEAS - MV MAX PG: 4.5 MMHG
BH CV ECHO MEAS - MV MEAN PG: 1.48 MMHG
BH CV ECHO MEAS - MV V2 VTI: 28.6 CM
BH CV ECHO MEAS - MVA(VTI): 1.99 CM2
BH CV ECHO MEAS - PA ACC TIME: 0.1 SEC
BH CV ECHO MEAS - PA V2 MAX: 70.8 CM/SEC
BH CV ECHO MEAS - PI END-D VEL: 99.7 CM/SEC
BH CV ECHO MEAS - PULM A REVS DUR: 0.09 SEC
BH CV ECHO MEAS - PULM A REVS VEL: 30.3 CM/SEC
BH CV ECHO MEAS - PULM DIAS VEL: 24.2 CM/SEC
BH CV ECHO MEAS - PULM S/D: 2.48
BH CV ECHO MEAS - PULM SYS VEL: 60 CM/SEC
BH CV ECHO MEAS - RAP SYSTOLE: 3 MMHG
BH CV ECHO MEAS - RVSP: 40.7 MMHG
BH CV ECHO MEAS - SV(LVOT): 57 ML
BH CV ECHO MEAS - SV(MOD-SP4): 51.3 ML
BH CV ECHO MEAS - SVI(LVOT): 30.7 ML/M2
BH CV ECHO MEAS - SVI(MOD-SP4): 27.6 ML/M2
BH CV ECHO MEAS - TAPSE (>1.6): 2.5 CM
BH CV ECHO MEAS - TR MAX PG: 37.7 MMHG
BH CV ECHO MEAS - TR MAX VEL: 273.9 CM/SEC
BH CV XLRA - RV BASE: 3.8 CM
BH CV XLRA - RV MID: 1.6 CM

## 2024-05-20 PROCEDURE — 93306 TTE W/DOPPLER COMPLETE: CPT

## 2024-05-20 RX ORDER — ASPIRIN 81 MG/1
81 TABLET ORAL DAILY
Qty: 30 TABLET | Refills: 0 | Status: SHIPPED | OUTPATIENT
Start: 2024-05-20

## 2024-05-20 RX ORDER — POTASSIUM CHLORIDE 750 MG/1
10 TABLET, FILM COATED, EXTENDED RELEASE ORAL 2 TIMES DAILY
Qty: 60 TABLET | Refills: 0 | Status: SHIPPED | OUTPATIENT
Start: 2024-05-20

## 2024-05-20 RX ORDER — COLCHICINE 0.6 MG/1
0.6 TABLET ORAL DAILY
Qty: 30 TABLET | Refills: 0 | Status: SHIPPED | OUTPATIENT
Start: 2024-05-20

## 2024-05-20 NOTE — TELEPHONE ENCOUNTER
Colchicine is a contradiction to Carvediolol  Please let pharmacist know if ok to fill    [facility] [phone]

## 2024-05-20 NOTE — TELEPHONE ENCOUNTER
Rx Refill Note  Requested Prescriptions      No prescriptions requested or ordered in this encounter      Last office visit with prescribing clinician: 2/22/2024   Last telemedicine visit with prescribing clinician: Visit date not found   Next office visit with prescribing clinician: Visit date not found                         Would you like a call back once the refill request has been completed: [] Yes [] No    If the office needs to give you a call back, can they leave a voicemail: [] Yes [] No    Tobias Garcia Rep  05/20/24, 09:51 EDT

## 2024-06-15 NOTE — PROGRESS NOTES
Encounter Date:06/25/2024        Patient ID: John Morales is a 58 y.o. male.      Chief Complaint:      History of Present Illness  58 years old man with hypertension, hyperlipidemia, coronary artery disease, nonischemic dilated cardiomyopathy, HFrEF who presents to cardiology office for follow-up.  Echocardiogram showed EF of 29% for which she was started on GDMT.  He also had cardiac catheterization in February 2024 with PCI to RCA.  Cardiomyopathy was thought to be out of proportion to degree of coronary artery disease.    Current cardiac medications include aspirin, Coreg, Jardiance, Lasix, losartan, Crestor, Brilinta.      The following portions of the patient's history were reviewed and updated as appropriate: allergies, current medications, past family history, past medical history, past social history, past surgical history, and problem list.    Review of Systems   Constitutional: Positive for malaise/fatigue.   Cardiovascular:  Positive for leg swelling. Negative for chest pain, dyspnea on exertion and palpitations.   Respiratory:  Positive for shortness of breath. Negative for cough.    Gastrointestinal:  Negative for abdominal pain, nausea and vomiting.   Neurological:  Positive for dizziness and light-headedness. Negative for focal weakness, headaches and numbness.   All other systems reviewed and are negative.        Current Outpatient Medications:     aspirin 81 MG EC tablet, Take 1 tablet by mouth Daily., Disp: 30 tablet, Rfl: 0    carvedilol (COREG) 6.25 MG tablet, Take 1 tablet by mouth 2 (Two) Times a Day With Meals., Disp: 180 tablet, Rfl: 0    colchicine 0.6 MG tablet, Take 1 tablet by mouth Daily., Disp: 30 tablet, Rfl: 0    empagliflozin (JARDIANCE) 10 MG tablet tablet, Take 1 tablet by mouth Daily., Disp: 90 tablet, Rfl: 0    furosemide (Lasix) 20 MG tablet, Take 1 tablet by mouth 2 (Two) Times a Day., Disp: 180 tablet, Rfl: 0    losartan (Cozaar) 25 MG tablet, Take 1 tablet by mouth  Daily., Disp: 90 tablet, Rfl: 0    potassium chloride 10 MEQ CR tablet, Take 1 tablet by mouth 2 (Two) Times a Day., Disp: 60 tablet, Rfl: 0    rosuvastatin (CRESTOR) 10 MG tablet, Take 1 tablet by mouth Every Night., Disp: 90 tablet, Rfl: 0    sildenafil (Viagra) 50 MG tablet, One tablet as needed before sex and not to use if take nitrates, Disp: 12 tablet, Rfl: 3    ticagrelor (BRILINTA) 90 MG tablet tablet, Take 1 tablet by mouth 2 (Two) Times a Day., Disp: 180 tablet, Rfl: 3    Current outpatient and discharge medications have been reconciled for the patient.  Reviewed by: Yaw Zavaleta MD       No Known Allergies    Family History   Problem Relation Age of Onset    No Known Problems Mother     No Known Problems Father     No Known Problems Sister     No Known Problems Brother        Past Surgical History:   Procedure Laterality Date    ARM WOUND REPAIR / CLOSURE Left 2014    infection in bone    CARDIAC CATHETERIZATION Right 2/4/2024    Procedure: Right and Left Heart Cath;  Surgeon: Yaw Zavaleta MD;  Location: [facility] CATH INVASIVE LOCATION;  Service: Cardiovascular;  Laterality: Right;    COLONOSCOPY N/A 9/26/2019    Procedure: COLONOSCOPY WITH POLYPECTOMY X 2,;  Surgeon: Sukhdev Silva MD;  Location: [facility] MAIN OR;  Service: Gastroenterology    MASS EXCISION N/A 9/26/2019    Procedure: Excision of perianal skin tags;  Surgeon: Robert Stinson MD;  Location: [facility] MAIN OR;  Service: General    SKIN TAG REMOVAL         Past Medical History:   Diagnosis Date    Ankle fracture     right ankle    Arthritis     Gout    Back injury 07/01/2007    broke    Other hyperlipidemia 02/09/2024       Family History   Problem Relation Age of Onset    No Known Problems Mother     No Known Problems Father     No Known Problems Sister     No Known Problems Brother        Social History     Socioeconomic History    Marital status: Single   Tobacco Use    Smoking status: Never    Smokeless tobacco: Never  "  Vaping Use    Vaping status: Never Used   Substance and Sexual Activity    Alcohol use: No    Drug use: No    Sexual activity: Defer               Objective:       Physical Exam    /84 (BP Location: Right arm, Patient Position: Sitting, Cuff Size: Adult)   Pulse 66   Ht 170.2 cm (67\")   Wt 77.6 kg (171 lb)   SpO2 99%   BMI 26.78 kg/m²   The patient is alert, oriented and in no distress.    Vital signs as noted above.    Head and neck revealed no carotid bruits or jugular venous distension.  No thyromegaly or lymphadenopathy is present.    Lungs clear.  No wheezing.  Breath sounds are normal bilaterally.    Heart normal first and second heart sounds.  No murmur..  No pericardial rub is present.  No gallop is present.    Abdomen soft and nontender.  No organomegaly is present.    Extremities revealed good peripheral pulses without any pedal edema.    Skin warm and dry.    Musculoskeletal system is grossly normal.    CNS grossly normal.           Diagnosis Plan   1. Acute HFrEF (heart failure with reduced ejection fraction)        2. Coronary artery disease involving native coronary artery of native heart with unstable angina pectoris        3. Primary hypertension        4. S/P drug eluting coronary stent placement        5. Mixed hyperlipidemia        6. NICM (nonischemic cardiomyopathy)        LAB RESULTS (LAST 7 DAYS)    CBC        BMP        CMP         BNP        TROPONIN        CoAg        Creatinine Clearance  CrCl cannot be calculated (Patient's most recent lab result is older than the maximum 30 days allowed.).    ABG        Radiology  No radiology results for the last day    EKG  Procedures    Stress test  Results for orders placed during the hospital encounter of 02/01/24    Stress Test With Myocardial Perfusion One Day    Interpretation Summary    Findings consistent with a normal ECG stress test.    Left ventricular ejection fraction is moderately reduced (Calculated EF = 27%).    Myocardial " perfusion imaging indicates a large-sized, severe area of ischemia located in the inferior wall.    Impressions are consistent with a high risk study.      Echocardiogram  Results for orders placed in visit on 02/20/24    Adult Transthoracic Echo Complete W/ Color, Spectral and Contrast if Necessary Per Protocol    Interpretation Summary    Left ventricular systolic function is moderately decreased. Calculated left ventricular EF = 30%    The left ventricular cavity is moderate to severely dilated.    Left ventricular diastolic function is consistent with (grade I) impaired relaxation.    Estimated right ventricular systolic pressure from tricuspid regurgitation is mildly elevated (35-45 mmHg).    Mild mitral valve regurgitation is present      Cardiac catheterization  Results for orders placed during the hospital encounter of 02/01/24    Cardiac Catheterization/Vascular Study    Conclusion  OPERATORS  Yaw Zavaleta M.D. (Attending Cardiologist)      PROCEDURE PERFORMED  Ultrasound guided vascular access  Right heart Catheterization  Left Heart Catheterization  Coronary Angiogram 49871  PCI with VELASQUEZ placement RCA 43297  IVUS of RCA 47864  Moderate Sedation 45 minutes    INDICATIONS FOR PROCEDURE  58-year-old man who presented with HFrEF exacerbation and was noted to have minimally elevated high-sensitivity troponin and abnormal nuclear stress test in the inferior wall.  He also has pulmonary hypertension.  After discussing the risk and benefit of the procedure he was brought in for right and left heart cath.    PROCEDURE IN DETAIL  Informed consent was obtained from the patient after explaining the risks, benefits, and alternative options of the procedure. After obtaining informed consent, the patient was brought to the cath lab and was prepped in a sterile fashion. Lidocaine 1% was used for local anesthesia into the right IJ access site. Right IJ vein was accessed using the micropuncture needle under ultrasound  guidance and micropuncture wire advanced under flouroscopy. A 7 [place] vascular sheath was put into place percutaneously over guide-wire. Guide wires were removed. A 7Fr swan yari catheter was advanced to wedge position. RA, RV and PA and wedge pressures were recorded. PA sat and arterial sats recorded and the swan was subsequently removed in its entirety. Next, The right radial artery was accessed with an angiocath needle under ultrasound guidance. A 6F slendersheath was inserted successfully.  Afterwards, 6F JR4 diagnostic catheter was advanced over a wire into the ascending aorta and was used to cross the AV and obtain LV pressures.  Gradient across the AV measured via pullback technique.  Next, 6F JL3.5 and JR4 catheters were used to engage the ostia of the left main and RCA respectively. Images of the right and left coronary systems were obtained.    CORONARY ANGIOGRAM FINDINGS:  Dominance: Right    #Left main: Left main is a large vessel Which gives rise to the LAD left circumflex artery.  Left main is angiographically free from any significant disease.    #Left Anterior Descending Artery: LAD is a large caliber vessel which gives rise to several septal perforators and several diagonal branches.  Mid LAD has tandem 50% stenosis.    #Left Circumflex: The LCx is a large, non-dominant vessel which gives rise to OM branches.  Mid left circumflex has 40% stenosis, OM1 has 50% stenosis.    #Right Coronary Artery: The RCA is a large, dominant vessel which gives rise to several small caliber branches and the PDA and PLV.  Distal RCA had 90% focal stenosis.  SASCHA-3 flow.  Lesion length 10 mm.    Percutaneous coronary intervention  100 units/kg of heparin was administered and ACT of more than 250 was documented.  I used a 6 Romanian JR4 guide catheter to engage the ostium of right coronary artery.  A 0.014 run-through wire was then advanced past the lesion into the distal RCA.  I then advanced intravascular ultrasound  catheter and performed a slow manual pullback from distal to mid RCA.  Reference vessel was 3 mm and at the lesion the vessel measured 1.2 mm.  Noncalcific fibro atherosclerotic 90% lesion was confirmed with IVUS visualization.  I then placed a 2.75 x 18 mm Xience don point stent which was postdilated with 3 x 15 mm noncompliant balloon at 16 tracy.  Final angiography showed 0% stenosis in the RCA with SASCHA-3 flow.    The catheters were exchanged over a wire and subsequently removed. The patient tolerated the procedure well without any complications. The pictures were reviewed at the end of the procedure. A TR band was applied.      [facility] HEMODYNAMICS:  LVp 98/7, 11 mmHg  AOp 95/69, 79 mmHg  No significant gradient on pullback.  LV gram was not performed due to recent echocardiogram on file.    [facility] HEMODYNAMICS:  RA 7/5, 5 mmHg  RV 31/4, 7 mmHg  PA 33/12, 22 mmHg  PCW 16/11, 12 mmHg  AO Sat 97%  PA Sat 70%    Jesus CO 4.37 liters per minute    Jesus CI 2.32 liters per minute per meter square    SVR: 1335 dynes-sec/cm5 (normal 700-1600)  PVR: 183 dynes-sec/cm5 (normal )    ESTIMATED BLOOD LOSS:  10 ml    COMPLICATIONS:  None    PROCEDURE DATA:  Contrast Used:40 cc  Fluoro Time: 3.12 mins  Sedation Time:    IMPRESSIONS  IVUS guided PCI to distal RCA with VELASQUEZ placement  LVEDP and wedge pressure are normal  Nonischemic cardiomyopathy (cardiomyopathy out of proportion to coronary disease)  Mild pulmonary hypertension    RECOMMENDATIONS  -- Start dual antiplatelet therapy and high intensity statin  -- Uptitrate GDMT for cardiomyopathy  --Risk factor and lifestyle modifications  -- Stop IV diuretics and switch to oral    Electronically signed by Yaw Zavaleta MD, 02/04/24, 1:26 PM EST.          Assessment and Plan       Diagnoses and all orders for this visit:    1. Acute HFrEF (heart failure with reduced ejection fraction) (Primary)    2. Coronary artery disease involving native coronary artery of native heart with  unstable angina pectoris    3. Primary hypertension    4. S/P drug eluting coronary stent placement    5. Mixed hyperlipidemia    6. NICM (nonischemic cardiomyopathy)         Acute HFrEF  Echocardiogram February 2024 shows EF of 29% with grade 2 diastolic dysfunction  Elevated proBNP, minimally elevated high-sensitivity troponin.  Continue diuretics.  EDP during cardiac cath was normal.    Continue losartan, beta-blocker and Jardiance.  Repeat echocardiogram May 2024 shows EF of 30%.  I will refer him to EP for ICD placement.  Plan explained to the patient and he is in agreement.  Cardiomyopathy is out of proportion to coronary disease.  Will repeat another echocardiogram in about 6 months after ICD placement  I have filled out the handicap form today     Coronary artery disease  Status post PCI with VELASQUEZ placement to RCA  Continue dual antiplatelet therapy and high intensity statin  Medical management for nonobstructive disease in the LAD.     Hypertension  Continue losartan and beta-blocker  Increase the dose of losartan for better blood pressure control     Hyperlipidemia  LDL 38, HDL 45, triglyceride 93 and total cholesterol 101  Added Crestor  He is already at goal with LDL     Overweight  BMI is 26.8.  He weighs 171 pounds  Lifestyle modifications and healthy cardiovascular habits discussed with the patient.

## 2024-06-25 ENCOUNTER — PREP FOR SURGERY (OUTPATIENT)
Dept: OTHER | Facility: HOSPITAL | Age: 59
End: 2024-06-25
Payer: COMMERCIAL

## 2024-06-25 ENCOUNTER — TELEPHONE (OUTPATIENT)
Dept: CARDIOLOGY | Facility: CLINIC | Age: 59
End: 2024-06-25
Payer: COMMERCIAL

## 2024-06-25 ENCOUNTER — OFFICE VISIT (OUTPATIENT)
Dept: CARDIOLOGY | Facility: CLINIC | Age: 59
End: 2024-06-25
Payer: COMMERCIAL

## 2024-06-25 VITALS
SYSTOLIC BLOOD PRESSURE: 146 MMHG | DIASTOLIC BLOOD PRESSURE: 84 MMHG | BODY MASS INDEX: 26.84 KG/M2 | WEIGHT: 171 LBS | OXYGEN SATURATION: 99 % | HEIGHT: 67 IN | HEART RATE: 66 BPM

## 2024-06-25 DIAGNOSIS — E78.2 MIXED HYPERLIPIDEMIA: ICD-10-CM

## 2024-06-25 DIAGNOSIS — Z95.5 S/P DRUG ELUTING CORONARY STENT PLACEMENT: ICD-10-CM

## 2024-06-25 DIAGNOSIS — I25.110 CORONARY ARTERY DISEASE INVOLVING NATIVE CORONARY ARTERY OF NATIVE HEART WITH UNSTABLE ANGINA PECTORIS: ICD-10-CM

## 2024-06-25 DIAGNOSIS — I10 PRIMARY HYPERTENSION: ICD-10-CM

## 2024-06-25 DIAGNOSIS — I50.21 ACUTE HFREF (HEART FAILURE WITH REDUCED EJECTION FRACTION): Primary | ICD-10-CM

## 2024-06-25 DIAGNOSIS — I42.8 NICM (NONISCHEMIC CARDIOMYOPATHY): ICD-10-CM

## 2024-06-25 PROCEDURE — 99214 OFFICE O/P EST MOD 30 MIN: CPT | Performed by: INTERNAL MEDICINE

## 2024-06-25 RX ORDER — LOSARTAN POTASSIUM 50 MG/1
50 TABLET ORAL DAILY
Qty: 90 TABLET | Refills: 3 | Status: SHIPPED | OUTPATIENT
Start: 2024-06-25

## 2024-06-25 NOTE — TELEPHONE ENCOUNTER
Rx Refill Note  Requested Prescriptions     Pending Prescriptions Disp Refills    colchicine 0.6 MG tablet 30 tablet 0     Sig: Take 1 tablet by mouth Daily.    empagliflozin (JARDIANCE) 10 MG tablet tablet 90 tablet 0     Sig: Take 1 tablet by mouth Daily.    potassium chloride 10 MEQ CR tablet 60 tablet 0     Sig: Take 1 tablet by mouth 2 (Two) Times a Day.      Last office visit with prescribing clinician: 2/22/2024   Last telemedicine visit with prescribing clinician: Visit date not found   Next office visit with prescribing clinician: Visit date not found                         Would you like a call back once the refill request has been completed: [] Yes [] No    If the office needs to give you a call back, can they leave a voicemail: [] Yes [] No    Tobias Garcia Rep  06/25/24, 15:31 EDT

## 2024-06-25 NOTE — TELEPHONE ENCOUNTER
Caller: John Morales    Relationship to patient: Self    Best call back number: [phone]    Patient is needing: PT WANTS TO RESCHEDULE HIS CATH.       Hub staff attempted to follow warm transfer process and was unsuccessful

## 2024-06-26 ENCOUNTER — PRIOR AUTHORIZATION (OUTPATIENT)
Dept: FAMILY MEDICINE CLINIC | Facility: CLINIC | Age: 59
End: 2024-06-26
Payer: COMMERCIAL

## 2024-06-26 RX ORDER — POTASSIUM CHLORIDE 750 MG/1
10 TABLET, FILM COATED, EXTENDED RELEASE ORAL 2 TIMES DAILY
Qty: 60 TABLET | Refills: 0 | Status: SHIPPED | OUTPATIENT
Start: 2024-06-26

## 2024-06-26 RX ORDER — COLCHICINE 0.6 MG/1
0.6 TABLET ORAL DAILY
Qty: 30 TABLET | Refills: 0 | Status: SHIPPED | OUTPATIENT
Start: 2024-06-26

## 2024-06-26 NOTE — TELEPHONE ENCOUNTER
Caller: John Morales    Relationship: Self    Best call back number: [phone]    Who are you requesting to speak with (clinical staff, provider,  specific staff member): JENNIFER    What was the call regarding: PT IS RETURNING A MISSED CALL TO SPEAK WITH JENNIFER ABOUT PROCEDURE ON 7/26/24.     HE ALSO HAD MORE QUESTIONS ABOUT THE PROCEDURE.

## 2024-07-24 ENCOUNTER — LAB (OUTPATIENT)
Dept: LAB | Facility: HOSPITAL | Age: 59
End: 2024-07-24
Payer: COMMERCIAL

## 2024-07-24 DIAGNOSIS — I50.21 ACUTE HFREF (HEART FAILURE WITH REDUCED EJECTION FRACTION): ICD-10-CM

## 2024-07-24 LAB
ALBUMIN SERPL-MCNC: 4.4 G/DL (ref 3.5–5.2)
ALBUMIN/GLOB SERPL: 1.8 G/DL
ALP SERPL-CCNC: 72 U/L (ref 39–117)
ALT SERPL W P-5'-P-CCNC: 33 U/L (ref 1–41)
ANION GAP SERPL CALCULATED.3IONS-SCNC: 11.4 MMOL/L (ref 5–15)
AST SERPL-CCNC: 29 U/L (ref 1–40)
BASOPHILS # BLD AUTO: 0.02 10*3/MM3 (ref 0–0.2)
BASOPHILS NFR BLD AUTO: 0.3 % (ref 0–1.5)
BILIRUB SERPL-MCNC: 0.9 MG/DL (ref 0–1.2)
BUN SERPL-MCNC: 12 MG/DL (ref 6–20)
BUN/CREAT SERPL: 13.8 (ref 7–25)
CALCIUM SPEC-SCNC: 9.2 MG/DL (ref 8.6–10.5)
CHLORIDE SERPL-SCNC: 105 MMOL/L (ref 98–107)
CO2 SERPL-SCNC: 23.6 MMOL/L (ref 22–29)
CREAT SERPL-MCNC: 0.87 MG/DL (ref 0.76–1.27)
DEPRECATED RDW RBC AUTO: 39.9 FL (ref 37–54)
EGFRCR SERPLBLD CKD-EPI 2021: 99.4 ML/MIN/1.73
EOSINOPHIL # BLD AUTO: 0.09 10*3/MM3 (ref 0–0.4)
EOSINOPHIL NFR BLD AUTO: 1.3 % (ref 0.3–6.2)
ERYTHROCYTE [DISTWIDTH] IN BLOOD BY AUTOMATED COUNT: 11.8 % (ref 12.3–15.4)
GLOBULIN UR ELPH-MCNC: 2.5 GM/DL
GLUCOSE SERPL-MCNC: 73 MG/DL (ref 65–99)
HCT VFR BLD AUTO: 41.4 % (ref 37.5–51)
HGB BLD-MCNC: 13.8 G/DL (ref 13–17.7)
IMM GRANULOCYTES # BLD AUTO: 0.03 10*3/MM3 (ref 0–0.05)
IMM GRANULOCYTES NFR BLD AUTO: 0.4 % (ref 0–0.5)
INR PPP: 1.04 (ref 0.93–1.1)
LYMPHOCYTES # BLD AUTO: 2.05 10*3/MM3 (ref 0.7–3.1)
LYMPHOCYTES NFR BLD AUTO: 29.9 % (ref 19.6–45.3)
MAGNESIUM SERPL-MCNC: 2.3 MG/DL (ref 1.6–2.6)
MCH RBC QN AUTO: 30.7 PG (ref 26.6–33)
MCHC RBC AUTO-ENTMCNC: 33.3 G/DL (ref 31.5–35.7)
MCV RBC AUTO: 92 FL (ref 79–97)
MONOCYTES # BLD AUTO: 0.58 10*3/MM3 (ref 0.1–0.9)
MONOCYTES NFR BLD AUTO: 8.5 % (ref 5–12)
NEUTROPHILS NFR BLD AUTO: 4.09 10*3/MM3 (ref 1.7–7)
NEUTROPHILS NFR BLD AUTO: 59.6 % (ref 42.7–76)
NRBC BLD AUTO-RTO: 0 /100 WBC (ref 0–0.2)
PLATELET # BLD AUTO: 189 10*3/MM3 (ref 140–450)
PMV BLD AUTO: 12.5 FL (ref 6–12)
POTASSIUM SERPL-SCNC: 4.4 MMOL/L (ref 3.5–5.2)
PROT SERPL-MCNC: 6.9 G/DL (ref 6–8.5)
PROTHROMBIN TIME: 11.3 SECONDS (ref 9.6–11.7)
RBC # BLD AUTO: 4.5 10*6/MM3 (ref 4.14–5.8)
SODIUM SERPL-SCNC: 140 MMOL/L (ref 136–145)
WBC NRBC COR # BLD AUTO: 6.86 10*3/MM3 (ref 3.4–10.8)

## 2024-07-24 PROCEDURE — 83735 ASSAY OF MAGNESIUM: CPT

## 2024-07-24 PROCEDURE — 80053 COMPREHEN METABOLIC PANEL: CPT

## 2024-07-24 PROCEDURE — 36415 COLL VENOUS BLD VENIPUNCTURE: CPT

## 2024-07-24 PROCEDURE — 85610 PROTHROMBIN TIME: CPT

## 2024-07-24 PROCEDURE — 85025 COMPLETE CBC W/AUTO DIFF WBC: CPT

## 2024-07-26 ENCOUNTER — HOSPITAL ENCOUNTER (OUTPATIENT)
Facility: HOSPITAL | Age: 59
Setting detail: OBSERVATION
Discharge: HOME | End: 2024-07-27
Attending: INTERNAL MEDICINE | Admitting: INTERNAL MEDICINE
Payer: COMMERCIAL

## 2024-07-26 ENCOUNTER — ANESTHESIA EVENT (OUTPATIENT)
Dept: CARDIOLOGY | Facility: HOSPITAL | Age: 59
End: 2024-07-26
Payer: COMMERCIAL

## 2024-07-26 ENCOUNTER — APPOINTMENT (OUTPATIENT)
Dept: GENERAL RADIOLOGY | Facility: HOSPITAL | Age: 59
End: 2024-07-26
Payer: COMMERCIAL

## 2024-07-26 ENCOUNTER — ANESTHESIA (OUTPATIENT)
Dept: CARDIOLOGY | Facility: HOSPITAL | Age: 59
End: 2024-07-26
Payer: COMMERCIAL

## 2024-07-26 DIAGNOSIS — I50.21 ACUTE HFREF (HEART FAILURE WITH REDUCED EJECTION FRACTION): ICD-10-CM

## 2024-07-26 PROBLEM — I50.22 CHRONIC SYSTOLIC CHF (CONGESTIVE HEART FAILURE): Status: ACTIVE | Noted: 2024-07-26

## 2024-07-26 PROCEDURE — S0260 H&P FOR SURGERY: HCPCS | Performed by: INTERNAL MEDICINE

## 2024-07-26 PROCEDURE — 25010000002 CEFAZOLIN PER 500 MG: Performed by: NURSE ANESTHETIST, CERTIFIED REGISTERED

## 2024-07-26 PROCEDURE — C1894 INTRO/SHEATH, NON-LASER: HCPCS | Performed by: INTERNAL MEDICINE

## 2024-07-26 PROCEDURE — 25010000002 PROPOFOL 1000 MG/100ML EMULSION: Performed by: NURSE ANESTHETIST, CERTIFIED REGISTERED

## 2024-07-26 PROCEDURE — 25010000002 CEFAZOLIN PER 500 MG: Performed by: INTERNAL MEDICINE

## 2024-07-26 PROCEDURE — 33249 INSJ/RPLCMT DEFIB W/LEAD(S): CPT | Performed by: INTERNAL MEDICINE

## 2024-07-26 PROCEDURE — C1722 AICD, SINGLE CHAMBER: HCPCS | Performed by: INTERNAL MEDICINE

## 2024-07-26 PROCEDURE — G0378 HOSPITAL OBSERVATION PER HR: HCPCS

## 2024-07-26 PROCEDURE — 25510000001 IOPAMIDOL PER 1 ML: Performed by: INTERNAL MEDICINE

## 2024-07-26 PROCEDURE — 25010000002 HYDRALAZINE PER 20 MG: Performed by: INTERNAL MEDICINE

## 2024-07-26 PROCEDURE — 25810000003 SODIUM CHLORIDE 0.9 % SOLUTION: Performed by: ANESTHESIOLOGY

## 2024-07-26 PROCEDURE — 71045 X-RAY EXAM CHEST 1 VIEW: CPT

## 2024-07-26 PROCEDURE — C1777 LEAD, AICD, ENDO SINGLE COIL: HCPCS | Performed by: INTERNAL MEDICINE

## 2024-07-26 DEVICE — LD ICD PLEXA PROMRI SGL COIL S DX 65/15: Type: IMPLANTABLE DEVICE | Site: HEART | Status: FUNCTIONAL

## 2024-07-26 DEVICE — IMPLANTABLE DEVICE
Type: IMPLANTABLE DEVICE | Site: CHEST | Status: FUNCTIONAL
Brand: ACTICOR 7 VR-T DX

## 2024-07-26 RX ORDER — SODIUM CHLORIDE 9 MG/ML
9 INJECTION, SOLUTION INTRAVENOUS CONTINUOUS PRN
Status: DISCONTINUED | OUTPATIENT
Start: 2024-07-26 | End: 2024-07-27 | Stop reason: HOSPADM

## 2024-07-26 RX ORDER — HYDRALAZINE HYDROCHLORIDE 20 MG/ML
10 INJECTION INTRAMUSCULAR; INTRAVENOUS EVERY 4 HOURS PRN
Status: DISCONTINUED | OUTPATIENT
Start: 2024-07-26 | End: 2024-07-27 | Stop reason: HOSPADM

## 2024-07-26 RX ORDER — ASPIRIN 81 MG/1
81 TABLET ORAL DAILY
Qty: 30 TABLET | Refills: 0 | OUTPATIENT
Start: 2024-07-26

## 2024-07-26 RX ORDER — ASPIRIN 81 MG/1
81 TABLET ORAL DAILY
Qty: 90 TABLET | Refills: 1 | Status: SHIPPED | OUTPATIENT
Start: 2024-07-26

## 2024-07-26 RX ORDER — LIDOCAINE HYDROCHLORIDE AND EPINEPHRINE BITARTRATE 20; .01 MG/ML; MG/ML
INJECTION, SOLUTION SUBCUTANEOUS
Status: DISCONTINUED | OUTPATIENT
Start: 2024-07-26 | End: 2024-07-26 | Stop reason: HOSPADM

## 2024-07-26 RX ORDER — CEPHALEXIN 500 MG/1
500 CAPSULE ORAL 2 TIMES DAILY
Qty: 10 CAPSULE | Refills: 0 | Status: SHIPPED | OUTPATIENT
Start: 2024-07-26 | End: 2024-07-31

## 2024-07-26 RX ORDER — ACETAMINOPHEN 650 MG/1
650 SUPPOSITORY RECTAL EVERY 8 HOURS
Status: DISCONTINUED | OUTPATIENT
Start: 2024-07-26 | End: 2024-07-27 | Stop reason: HOSPADM

## 2024-07-26 RX ORDER — NALOXONE HCL 0.4 MG/ML
0.4 VIAL (ML) INJECTION AS NEEDED
Status: DISCONTINUED | OUTPATIENT
Start: 2024-07-26 | End: 2024-07-27 | Stop reason: HOSPADM

## 2024-07-26 RX ORDER — COLCHICINE 0.6 MG/1
0.6 TABLET ORAL DAILY
Qty: 30 TABLET | Refills: 0 | Status: SHIPPED | OUTPATIENT
Start: 2024-07-26

## 2024-07-26 RX ORDER — PROPOFOL 10 MG/ML
INJECTION, EMULSION INTRAVENOUS AS NEEDED
Status: DISCONTINUED | OUTPATIENT
Start: 2024-07-26 | End: 2024-07-26 | Stop reason: SURG

## 2024-07-26 RX ORDER — LOSARTAN POTASSIUM 50 MG/1
50 TABLET ORAL DAILY
Status: DISCONTINUED | OUTPATIENT
Start: 2024-07-26 | End: 2024-07-27 | Stop reason: HOSPADM

## 2024-07-26 RX ORDER — ROSUVASTATIN CALCIUM 10 MG/1
10 TABLET, COATED ORAL NIGHTLY
Status: DISCONTINUED | OUTPATIENT
Start: 2024-07-26 | End: 2024-07-27 | Stop reason: HOSPADM

## 2024-07-26 RX ORDER — POTASSIUM CHLORIDE 750 MG/1
10 TABLET, EXTENDED RELEASE ORAL 2 TIMES DAILY
Status: DISCONTINUED | OUTPATIENT
Start: 2024-07-26 | End: 2024-07-27 | Stop reason: HOSPADM

## 2024-07-26 RX ORDER — ACETAMINOPHEN 160 MG/5ML
650 SOLUTION ORAL EVERY 8 HOURS
Status: DISCONTINUED | OUTPATIENT
Start: 2024-07-26 | End: 2024-07-27 | Stop reason: HOSPADM

## 2024-07-26 RX ORDER — ACETAMINOPHEN 325 MG/1
650 TABLET ORAL EVERY 8 HOURS
Status: DISCONTINUED | OUTPATIENT
Start: 2024-07-26 | End: 2024-07-27 | Stop reason: HOSPADM

## 2024-07-26 RX ORDER — COLCHICINE 0.6 MG/1
0.6 TABLET ORAL DAILY
Qty: 30 TABLET | Refills: 0 | OUTPATIENT
Start: 2024-07-26

## 2024-07-26 RX ORDER — CARVEDILOL 6.25 MG/1
6.25 TABLET ORAL 2 TIMES DAILY WITH MEALS
Status: DISCONTINUED | OUTPATIENT
Start: 2024-07-26 | End: 2024-07-27 | Stop reason: HOSPADM

## 2024-07-26 RX ORDER — FUROSEMIDE 20 MG/1
20 TABLET ORAL 2 TIMES DAILY
Status: DISCONTINUED | OUTPATIENT
Start: 2024-07-26 | End: 2024-07-27 | Stop reason: HOSPADM

## 2024-07-26 RX ORDER — ONDANSETRON 2 MG/ML
4 INJECTION INTRAMUSCULAR; INTRAVENOUS ONCE AS NEEDED
Status: COMPLETED | OUTPATIENT
Start: 2024-07-26 | End: 2024-07-27

## 2024-07-26 RX ORDER — SODIUM CHLORIDE 0.9 % (FLUSH) 0.9 %
10 SYRINGE (ML) INJECTION EVERY 12 HOURS SCHEDULED
Status: DISCONTINUED | OUTPATIENT
Start: 2024-07-26 | End: 2024-07-26 | Stop reason: HOSPADM

## 2024-07-26 RX ORDER — IPRATROPIUM BROMIDE AND ALBUTEROL SULFATE 2.5; .5 MG/3ML; MG/3ML
3 SOLUTION RESPIRATORY (INHALATION) ONCE AS NEEDED
Status: DISCONTINUED | OUTPATIENT
Start: 2024-07-26 | End: 2024-07-27 | Stop reason: HOSPADM

## 2024-07-26 RX ORDER — SODIUM CHLORIDE 0.9 % (FLUSH) 0.9 %
10 SYRINGE (ML) INJECTION AS NEEDED
Status: DISCONTINUED | OUTPATIENT
Start: 2024-07-26 | End: 2024-07-26 | Stop reason: HOSPADM

## 2024-07-26 RX ADMIN — ROSUVASTATIN 10 MG: 10 TABLET, FILM COATED ORAL at 20:37

## 2024-07-26 RX ADMIN — PROPOFOL INJECTABLE EMULSION 20 MG: 10 INJECTION, EMULSION INTRAVENOUS at 14:40

## 2024-07-26 RX ADMIN — PROPOFOL INJECTABLE EMULSION 20 MG: 10 INJECTION, EMULSION INTRAVENOUS at 15:01

## 2024-07-26 RX ADMIN — FUROSEMIDE 20 MG: 20 TABLET ORAL at 20:36

## 2024-07-26 RX ADMIN — CARVEDILOL 6.25 MG: 6.25 TABLET, FILM COATED ORAL at 16:07

## 2024-07-26 RX ADMIN — CEFAZOLIN 2 G: 10 INJECTION, POWDER, FOR SOLUTION INTRAVENOUS at 14:35

## 2024-07-26 RX ADMIN — PROPOFOL INJECTABLE EMULSION 100 MCG/KG/MIN: 10 INJECTION, EMULSION INTRAVENOUS at 14:39

## 2024-07-26 RX ADMIN — LOSARTAN POTASSIUM 50 MG: 50 TABLET, FILM COATED ORAL at 16:07

## 2024-07-26 RX ADMIN — Medication 50 MG: at 14:38

## 2024-07-26 RX ADMIN — POTASSIUM CHLORIDE 10 MEQ: 750 TABLET, EXTENDED RELEASE ORAL at 20:37

## 2024-07-26 RX ADMIN — PROPOFOL INJECTABLE EMULSION 20 MG: 10 INJECTION, EMULSION INTRAVENOUS at 15:00

## 2024-07-26 RX ADMIN — SODIUM CHLORIDE: 9 INJECTION, SOLUTION INTRAVENOUS at 14:25

## 2024-07-26 RX ADMIN — PROPOFOL INJECTABLE EMULSION 20 MG: 10 INJECTION, EMULSION INTRAVENOUS at 14:58

## 2024-07-26 RX ADMIN — PROPOFOL INJECTABLE EMULSION 20 MG: 10 INJECTION, EMULSION INTRAVENOUS at 15:03

## 2024-07-26 RX ADMIN — TICAGRELOR 90 MG: 90 TABLET ORAL at 20:37

## 2024-07-26 RX ADMIN — CEFAZOLIN 2000 MG: 2 INJECTION, POWDER, FOR SOLUTION INTRAVENOUS at 23:44

## 2024-07-26 RX ADMIN — HYDRALAZINE HYDROCHLORIDE 10 MG: 20 INJECTION INTRAMUSCULAR; INTRAVENOUS at 17:55

## 2024-07-26 RX ADMIN — PROPOFOL INJECTABLE EMULSION 40 MG: 10 INJECTION, EMULSION INTRAVENOUS at 14:38

## 2024-07-26 RX ADMIN — PROPOFOL INJECTABLE EMULSION 20 MG: 10 INJECTION, EMULSION INTRAVENOUS at 14:57

## 2024-07-26 NOTE — H&P
History of Present Illness  58 years old man with hypertension, hyperlipidemia, coronary artery disease, nonischemic dilated cardiomyopathy, HFrEF who presents to cardiology office for follow-up.  Echocardiogram showed EF of 29% for which she was started on GDMT.  He also had cardiac catheterization in February 2024 with PCI to RCA.  Cardiomyopathy was thought to be out of proportion to degree of coronary artery disease.     Current cardiac medications include aspirin, Coreg, Jardiance, Lasix, losartan, Crestor, Brilinta.        The following portions of the patient's history were reviewed and updated as appropriate: allergies, current medications, past family history, past medical history, past social history, past surgical history, and problem list.     Review of Systems   Constitutional: Positive for malaise/fatigue.   Cardiovascular:  Positive for leg swelling. Negative for chest pain, dyspnea on exertion and palpitations.   Respiratory:  Positive for shortness of breath. Negative for cough.    Gastrointestinal:  Negative for abdominal pain, nausea and vomiting.   Neurological:  Positive for dizziness and light-headedness. Negative for focal weakness, headaches and numbness.   All other systems reviewed and are negative.          Current Medications      Current Outpatient Medications:     aspirin 81 MG EC tablet, Take 1 tablet by mouth Daily., Disp: 30 tablet, Rfl: 0    carvedilol (COREG) 6.25 MG tablet, Take 1 tablet by mouth 2 (Two) Times a Day With Meals., Disp: 180 tablet, Rfl: 0    colchicine 0.6 MG tablet, Take 1 tablet by mouth Daily., Disp: 30 tablet, Rfl: 0    empagliflozin (JARDIANCE) 10 MG tablet tablet, Take 1 tablet by mouth Daily., Disp: 90 tablet, Rfl: 0    furosemide (Lasix) 20 MG tablet, Take 1 tablet by mouth 2 (Two) Times a Day., Disp: 180 tablet, Rfl: 0    losartan (Cozaar) 25 MG tablet, Take 1 tablet by mouth Daily., Disp: 90 tablet, Rfl: 0    potassium chloride 10 MEQ CR tablet, Take 1  tablet by mouth 2 (Two) Times a Day., Disp: 60 tablet, Rfl: 0    rosuvastatin (CRESTOR) 10 MG tablet, Take 1 tablet by mouth Every Night., Disp: 90 tablet, Rfl: 0    sildenafil (Viagra) 50 MG tablet, One tablet as needed before sex and not to use if take nitrates, Disp: 12 tablet, Rfl: 3    ticagrelor (BRILINTA) 90 MG tablet tablet, Take 1 tablet by mouth 2 (Two) Times a Day., Disp: 180 tablet, Rfl: 3        Current outpatient and discharge medications have been reconciled for the patient.  Reviewed by: Yaw Zavaleta MD        Allergies   No Known Allergies              Family History   Problem Relation Age of Onset    No Known Problems Mother      No Known Problems Father      No Known Problems Sister      No Known Problems Brother           Surgical History         Past Surgical History:   Procedure Laterality Date    ARM WOUND REPAIR / CLOSURE Left 2014     infection in bone    CARDIAC CATHETERIZATION Right 2/4/2024     Procedure: Right and Left Heart Cath;  Surgeon: Yaw Zavaleta MD;  Location: [facility] CATH INVASIVE LOCATION;  Service: Cardiovascular;  Laterality: Right;    COLONOSCOPY N/A 9/26/2019     Procedure: COLONOSCOPY WITH POLYPECTOMY X 2,;  Surgeon: Sukhdev Silva MD;  Location: [facility] MAIN OR;  Service: Gastroenterology    MASS EXCISION N/A 9/26/2019     Procedure: Excision of perianal skin tags;  Surgeon: Robert Stinson MD;  Location: [facility] MAIN OR;  Service: General    SKIN TAG REMOVAL                Medical History        Past Medical History:   Diagnosis Date    Ankle fracture       right ankle    Arthritis       Gout    Back injury 07/01/2007     broke    Other hyperlipidemia 02/09/2024                  Family History   Problem Relation Age of Onset    No Known Problems Mother      No Known Problems Father      No Known Problems Sister      No Known Problems Brother           Social History   Social History           Socioeconomic History    Marital status: Single   Tobacco  "Use    Smoking status: Never    Smokeless tobacco: Never   Vaping Use    Vaping status: Never Used   Substance and Sexual Activity    Alcohol use: No    Drug use: No    Sexual activity: Defer                        Objective:         Physical Exam     /84 (BP Location: Right arm, Patient Position: Sitting, Cuff Size: Adult)   Pulse 66   Ht 170.2 cm (67\")   Wt 77.6 kg (171 lb)   SpO2 99%   BMI 26.78 kg/m²   The patient is alert, oriented and in no distress.     Vital signs as noted above.     Head and neck revealed no carotid bruits or jugular venous distension.  No thyromegaly or lymphadenopathy is present.     Lungs clear.  No wheezing.  Breath sounds are normal bilaterally.     Heart normal first and second heart sounds.  No murmur..  No pericardial rub is present.  No gallop is present.     Abdomen soft and nontender.  No organomegaly is present.     Extremities revealed good peripheral pulses without any pedal edema.     Skin warm and dry.     Musculoskeletal system is grossly normal.     CNS grossly normal.                Diagnosis Plan   1. Acute HFrEF (heart failure with reduced ejection fraction)          2. Coronary artery disease involving native coronary artery of native heart with unstable angina pectoris          3. Primary hypertension          4. S/P drug eluting coronary stent placement          5. Mixed hyperlipidemia          6. NICM (nonischemic cardiomyopathy)          LAB RESULTS (LAST 7 DAYS)     CBC         BMP         CMP           BNP         TROPONIN         CoAg         Creatinine Clearance  CrCl cannot be calculated (Patient's most recent lab result is older than the maximum 30 days allowed.).     ABG         Radiology  No radiology results for the last day     EKG  Procedures     Stress test  Results for orders placed during the hospital encounter of 02/01/24     Stress Test With Myocardial Perfusion One Day     Interpretation Summary    Findings consistent with a normal ECG " stress test.    Left ventricular ejection fraction is moderately reduced (Calculated EF = 27%).    Myocardial perfusion imaging indicates a large-sized, severe area of ischemia located in the inferior wall.    Impressions are consistent with a high risk study.        Echocardiogram  Results for orders placed in visit on 02/20/24     Adult Transthoracic Echo Complete W/ Color, Spectral and Contrast if Necessary Per Protocol     Interpretation Summary    Left ventricular systolic function is moderately decreased. Calculated left ventricular EF = 30%    The left ventricular cavity is moderate to severely dilated.    Left ventricular diastolic function is consistent with (grade I) impaired relaxation.    Estimated right ventricular systolic pressure from tricuspid regurgitation is mildly elevated (35-45 mmHg).    Mild mitral valve regurgitation is present        Cardiac catheterization  Results for orders placed during the hospital encounter of 02/01/24     Cardiac Catheterization/Vascular Study     Conclusion  OPERATORS  Yaw Zavaleta M.D. (Attending Cardiologist)        PROCEDURE PERFORMED  Ultrasound guided vascular access  Right heart Catheterization  Left Heart Catheterization  Coronary Angiogram 02673  PCI with VELASQUEZ placement RCA 89933  IVUS of RCA 32190  Moderate Sedation 45 minutes     INDICATIONS FOR PROCEDURE  58-year-old man who presented with HFrEF exacerbation and was noted to have minimally elevated high-sensitivity troponin and abnormal nuclear stress test in the inferior wall.  He also has pulmonary hypertension.  After discussing the risk and benefit of the procedure he was brought in for right and left heart cath.     PROCEDURE IN DETAIL  Informed consent was obtained from the patient after explaining the risks, benefits, and alternative options of the procedure. After obtaining informed consent, the patient was brought to the cath lab and was prepped in a sterile fashion. Lidocaine 1% was used for local  anesthesia into the right IJ access site. Right IJ vein was accessed using the micropuncture needle under ultrasound guidance and micropuncture wire advanced under flouroscopy. A 7 Urdu vascular sheath was put into place percutaneously over guide-wire. Guide wires were removed. A 7Fr swan yari catheter was advanced to wedge position. RA, RV and PA and wedge pressures were recorded. PA sat and arterial sats recorded and the swan was subsequently removed in its entirety. Next, The right radial artery was accessed with an angiocath needle under ultrasound guidance. A 6F slendersheath was inserted successfully.  Afterwards, 6F JR4 diagnostic catheter was advanced over a wire into the ascending aorta and was used to cross the AV and obtain LV pressures.  Gradient across the AV measured via pullback technique.  Next, 6F JL3.5 and JR4 catheters were used to engage the ostia of the left main and RCA respectively. Images of the right and left coronary systems were obtained.     CORONARY ANGIOGRAM FINDINGS:  Dominance: Right     #Left main: Left main is a large vessel Which gives rise to the LAD left circumflex artery.  Left main is angiographically free from any significant disease.     #Left Anterior Descending Artery: LAD is a large caliber vessel which gives rise to several septal perforators and several diagonal branches.  Mid LAD has tandem 50% stenosis.     #Left Circumflex: The LCx is a large, non-dominant vessel which gives rise to OM branches.  Mid left circumflex has 40% stenosis, OM1 has 50% stenosis.     #Right Coronary Artery: The RCA is a large, dominant vessel which gives rise to several small caliber branches and the PDA and PLV.  Distal RCA had 90% focal stenosis.  SASCHA-3 flow.  Lesion length 10 mm.     Percutaneous coronary intervention  100 units/kg of heparin was administered and ACT of more than 250 was documented.  I used a 6 Tuval[place] JR4 guide catheter to engage the ostium of right coronary artery.  A  0.014 run-through wire was then advanced past the lesion into the distal RCA.  I then advanced intravascular ultrasound catheter and performed a slow manual pullback from distal to mid RCA.  Reference vessel was 3 mm and at the lesion the vessel measured 1.2 mm.  Noncalcific fibro atherosclerotic 90% lesion was confirmed with IVUS visualization.  I then placed a 2.75 x 18 mm Xience don point stent which was postdilated with 3 x 15 mm noncompliant balloon at 16 tracy.  Final angiography showed 0% stenosis in the RCA with SASCHA-3 flow.     The catheters were exchanged over a wire and subsequently removed. The patient tolerated the procedure well without any complications. The pictures were reviewed at the end of the procedure. A TR band was applied.        [facility] HEMODYNAMICS:  LVp 98/7, 11 mmHg  AOp 95/69, 79 mmHg  No significant gradient on pullback.  LV gram was not performed due to recent echocardiogram on file.     [facility] HEMODYNAMICS:  RA 7/5, 5 mmHg  RV 31/4, 7 mmHg  PA 33/12, 22 mmHg  PCW 16/11, 12 mmHg  AO Sat 97%  PA Sat 70%     Jesus CO 4.37 liters per minute     Jesus CI 2.32 liters per minute per meter square     SVR: 1335 dynes-sec/cm5 (normal 700-1600)  PVR: 183 dynes-sec/cm5 (normal )     ESTIMATED BLOOD LOSS:  10 ml     COMPLICATIONS:  None     PROCEDURE DATA:  Contrast Used:40 cc  Fluoro Time: 3.12 mins  Sedation Time:     IMPRESSIONS  IVUS guided PCI to distal RCA with VELASQUEZ placement  LVEDP and wedge pressure are normal  Nonischemic cardiomyopathy (cardiomyopathy out of proportion to coronary disease)  Mild pulmonary hypertension     RECOMMENDATIONS  -- Start dual antiplatelet therapy and high intensity statin  -- Uptitrate GDMT for cardiomyopathy  --Risk factor and lifestyle modifications  -- Stop IV diuretics and switch to oral     Electronically signed by Yaw Zavaleta MD, 02/04/24, 1:26 PM EST.              Assessment and Plan         Diagnoses and all orders for this visit:     1. Acute HFrEF (heart  failure with reduced ejection fraction) (Primary)     2. Coronary artery disease involving native coronary artery of native heart with unstable angina pectoris     3. Primary hypertension     4. S/P drug eluting coronary stent placement     5. Mixed hyperlipidemia     6. NICM (nonischemic cardiomyopathy)           Acute HFrEF  Echocardiogram February 2024 shows EF of 29% with grade 2 diastolic dysfunction  Elevated proBNP, minimally elevated high-sensitivity troponin.  Continue diuretics.  EDP during cardiac cath was normal.    Continue losartan, beta-blocker and Jardiance.  Repeat echocardiogram May 2024 shows EF of 30%.  I will refer him to EP for ICD placement.  Plan explained to the patient and he is in agreement.  Cardiomyopathy is out of proportion to coronary disease.  Will repeat another echocardiogram in about 6 months after ICD placement  I have filled out the handicap form today     Coronary artery disease  Status post PCI with VELASQUEZ placement to RCA  Continue dual antiplatelet therapy and high intensity statin  Medical management for nonobstructive disease in the LAD.     Hypertension  Continue losartan and beta-blocker  Increase the dose of losartan for better blood pressure control     Hyperlipidemia  LDL 38, HDL 45, triglyceride 93 and total cholesterol 101  Added Crestor  He is already at goal with LDL     Overweight  BMI is 26.8.  He weighs 171 pounds  Lifestyle modifications and healthy cardiovascular habits discussed with the patient.    Addendum    Patient is here today for ICD implant.

## 2024-07-26 NOTE — ANESTHESIA POSTPROCEDURE EVALUATION
Patient: John Morales    Procedure Summary       Date: 07/26/24 Room / Location: [place] CATH LAB 3 / [facility] CATH INVASIVE LOCATION    Anesthesia Start: 1430 Anesthesia Stop: 1553    Procedure: ICD SC new, Biotronik DX lead REPS EMAILED Diagnosis:       Acute HFrEF (heart failure with reduced ejection fraction)      (NYHA class II CHF despite revascularization and optimal medical therapy and EF < 35%)    Providers: Julianne Calero MD Provider: Hans Paredes MD    Anesthesia Type: general ASA Status: 4            Anesthesia Type: general    Vitals  Vitals Value Taken Time   /93 07/26/24 1600   Temp     Pulse 63 07/26/24 1605   Resp     SpO2 98 % 07/26/24 1605   Vitals shown include unfiled device data.        Post Anesthesia Care and Evaluation    Patient location during evaluation: PACU  Patient participation: complete - patient participated  Level of consciousness: awake  Pain scale: See nurse's notes for pain score.  Pain management: adequate    Airway patency: patent  Anesthetic complications: No anesthetic complications  PONV Status: none  Cardiovascular status: acceptable  Respiratory status: acceptable and spontaneous ventilation  Hydration status: acceptable    Comments: Patient seen and examined postoperatively; vital signs stable; SpO2 greater than or equal to 90%; cardiopulmonary status stable; nausea/vomiting adequately controlled; pain adequately controlled; no apparent anesthesia complications; patient discharged from anesthesia care when discharge criteria were met

## 2024-07-26 NOTE — ANESTHESIA PREPROCEDURE EVALUATION
Anesthesia Evaluation     Patient summary reviewed and Nursing notes reviewed   no history of anesthetic complications:   NPO Solid Status: > 8 hours  NPO Liquid Status: > 2 hours           Airway   Dental      Pulmonary    Cardiovascular     ECG reviewed  PT is on anticoagulation therapy  Patient on routine beta blocker    (+) hypertension, valvular problems/murmurs MR and TI, CAD, cardiac stents Drug eluting stent , dysrhythmias Tachycardia, PVC, angina at rest, CHF Systolic <55%, hyperlipidemia      Neuro/Psych  GI/Hepatic/Renal/Endo      Musculoskeletal     (+) arthralgias  Abdominal    Substance History      OB/GYN          Other   arthritis,     ROS/Med Hx Other: Additional History:  Dilated nonischemic cardiomyopathy, gout    Echo:    Echocardiogram Findings    Left Ventricle Left ventricular systolic function is moderately decreased. Calculated left ventricular EF = 30%     Normal left ventricular wall thickness noted. The left ventricular cavity is moderate to severely dilated. There is left ventricular global hypokinesis noted. Left ventricular diastolic function is consistent with (grade I) impaired relaxation.  Right Ventricle Normal right ventricular cavity size, wall thickness, systolic function and septal motion noted.  Left Atrium Normal left atrial size and volume noted.  Right Atrium Normal right atrial cavity size noted.  Aortic Valve The aortic valve is grossly normal in structure. Trace aortic valve regurgitation is present. No aortic valve stenosis is present.  Mitral Valve The mitral valve is grossly normal in structure. Mild mitral valve regurgitation is present. No significant mitral valve stenosis is present.  Tricuspid Valve The tricuspid valve is grossly normal in structure. Mild tricuspid valve regurgitation is present. Estimated right ventricular systolic pressure from tricuspid regurgitation is mildly elevated (35-45 mmHg). No tricuspid valve stenosis is present.  Pulmonic Valve The  pulmonic valve is not well visualized. The pulmonic valve is grossly normal in structure. There is trace pulmonic valve regurgitation present. There is no pulmonic valve stenosis present.  Greater Vessels No dilation of the aortic root is present. No dilation of the sinuses of Valsalva is present.  Pericardium There is no evidence of pericardial effusion. .        Stress Test:  ·  Findings consistent with a normal ECG stress test.  ·  Left ventricular ejection fraction is moderately reduced (Calculated EF = 27%).  ·  Myocardial perfusion imaging indicates a large-sized, severe area of ischemia located in the inferior wall.  ·  Impressions are consistent with a high risk study.      Cath:  CORONARY ANGIOGRAM FINDINGS:  Dominance: Right     #Left main: Left main is a large vessel Which gives rise to the LAD left circumflex artery.  Left main is angiographically free from any significant disease.     #Left Anterior Descending Artery: LAD is a large caliber vessel which gives rise to several septal perforators and several diagonal branches.  Mid LAD has tandem 50% stenosis.     #Left Circumflex: The LCx is a large, non-dominant vessel which gives rise to OM branches.  Mid left circumflex has 40% stenosis, OM1 has 50% stenosis.     #Right Coronary Artery: The RCA is a large, dominant vessel which gives rise to several small caliber branches and the PDA and PLV.  Distal RCA had 90% focal stenosis.  SASCHA-3 flow.  Lesion length 10 mm.     Percutaneous coronary intervention  100 units/kg of heparin was administered and ACT of more than 250 was documented.  I used a 6 Guinean JR4 guide catheter to engage the ostium of right coronary artery.  A 0.014 run-through wire was then advanced past the lesion into the distal RCA.  I then advanced intravascular ultrasound catheter and performed a slow manual pullback from distal to mid RCA.  Reference vessel was 3 mm and at the lesion the vessel measured 1.2 mm.  Noncalcific fibro  atherosclerotic 90% lesion was confirmed with IVUS visualization.  I then placed a 2.75 x 18 mm Xience don point stent which was postdilated with 3 x 15 mm noncompliant balloon at 16 tracy.  Final angiography showed 0% stenosis in the RCA with SASCHA-3 flow.      The catheters were exchanged over a wire and subsequently removed. The patient tolerated the procedure well without any complications. The pictures were reviewed at the end of the procedure. A TR band was applied.        [facility] HEMODYNAMICS:  LVp 98/7, 11 mmHg  AOp 95/69, 79 mmHg  No significant gradient on pullback.  LV gram was not performed due to recent echocardiogram on file.     [facility] HEMODYNAMICS:  RA 7/5, 5 mmHg  RV 31/4, 7 mmHg  PA 33/12, 22 mmHg  PCW 16/11, 12 mmHg  AO Sat 97%  PA Sat 70%     Jesus CO 4.37 liters per minute     Jesus CI 2.32 liters per minute per meter square     SVR: 1335 dynes-sec/cm5 (normal 700-1600)  PVR: 183 dynes-sec/cm5 (normal )     ESTIMATED BLOOD LOSS:  10 ml     COMPLICATIONS:  None     PROCEDURE DATA:  Contrast Used:40 cc  Fluoro Time: 3.12 mins  Sedation Time:     IMPRESSIONS  IVUS guided PCI to distal RCA with VELASQUEZ placement  LVEDP and wedge pressure are normal  Nonischemic cardiomyopathy (cardiomyopathy out of proportion to coronary disease)  Mild pulmonary hypertension     RECOMMENDATIONS  -- Start dual antiplatelet therapy and high intensity statin  -- Uptitrate GDMT for cardiomyopathy  --Risk factor and lifestyle modifications  -- Stop IV diuretics and switch to oral      PSH:  ARM WOUND REPAIR / CLOSURE MASS EXCISION  COLONOSCOPY SKIN TAG REMOVAL  CARDIAC CATHETERIZATION                 Anesthesia Plan    ASA 4     general   total IV anesthesia  (Patient identified; pre-operative vital signs, all relevant labs/studies, complete medical/surgical/anesthetic history, full medication list, full allergy list, and NPO status obtained/reviewed; physical assessment performed; anesthetic options, side effects, potential  complications, risks, and benefits discussed; questions answered; written anesthesia consent obtained; patient cleared for procedure; anesthesia machine and equipment checked and functioning)  intravenous induction     Anesthetic plan, risks, benefits, and alternatives have been provided, discussed and informed consent has been obtained with: patient.    Plan discussed with CRNA and CAA.    CODE STATUS:

## 2024-07-27 ENCOUNTER — READMISSION MANAGEMENT (OUTPATIENT)
Dept: CALL CENTER | Facility: HOSPITAL | Age: 59
End: 2024-07-27
Payer: COMMERCIAL

## 2024-07-27 ENCOUNTER — APPOINTMENT (OUTPATIENT)
Dept: GENERAL RADIOLOGY | Facility: HOSPITAL | Age: 59
End: 2024-07-27
Payer: COMMERCIAL

## 2024-07-27 VITALS
SYSTOLIC BLOOD PRESSURE: 140 MMHG | TEMPERATURE: 98 F | BODY MASS INDEX: 25.69 KG/M2 | HEIGHT: 68 IN | OXYGEN SATURATION: 96 % | WEIGHT: 169.53 LBS | HEART RATE: 72 BPM | RESPIRATION RATE: 18 BRPM | DIASTOLIC BLOOD PRESSURE: 86 MMHG

## 2024-07-27 PROBLEM — Z95.810 PRESENCE OF IMPLANTABLE CARDIOVERTER-DEFIBRILLATOR (ICD): Status: ACTIVE | Noted: 2024-07-26

## 2024-07-27 LAB
MAGNESIUM SERPL-MCNC: 3 MG/DL (ref 1.6–2.6)
POTASSIUM SERPL-SCNC: 4.2 MMOL/L (ref 3.5–5.2)

## 2024-07-27 PROCEDURE — 84132 ASSAY OF SERUM POTASSIUM: CPT | Performed by: INTERNAL MEDICINE

## 2024-07-27 PROCEDURE — 25010000002 HYDRALAZINE PER 20 MG: Performed by: INTERNAL MEDICINE

## 2024-07-27 PROCEDURE — 25010000002 ONDANSETRON PER 1 MG: Performed by: NURSE ANESTHETIST, CERTIFIED REGISTERED

## 2024-07-27 PROCEDURE — 71045 X-RAY EXAM CHEST 1 VIEW: CPT

## 2024-07-27 PROCEDURE — 25010000002 CEFAZOLIN PER 500 MG: Performed by: INTERNAL MEDICINE

## 2024-07-27 PROCEDURE — 99238 HOSP IP/OBS DSCHRG MGMT 30/<: CPT | Performed by: NURSE PRACTITIONER

## 2024-07-27 PROCEDURE — 93005 ELECTROCARDIOGRAM TRACING: CPT | Performed by: INTERNAL MEDICINE

## 2024-07-27 PROCEDURE — G0378 HOSPITAL OBSERVATION PER HR: HCPCS

## 2024-07-27 PROCEDURE — 83735 ASSAY OF MAGNESIUM: CPT | Performed by: INTERNAL MEDICINE

## 2024-07-27 PROCEDURE — 93010 ELECTROCARDIOGRAM REPORT: CPT | Performed by: INTERNAL MEDICINE

## 2024-07-27 RX ADMIN — EMPAGLIFLOZIN 10 MG: 10 TABLET, FILM COATED ORAL at 09:25

## 2024-07-27 RX ADMIN — TICAGRELOR 90 MG: 90 TABLET ORAL at 09:24

## 2024-07-27 RX ADMIN — ONDANSETRON 4 MG: 2 INJECTION INTRAMUSCULAR; INTRAVENOUS at 01:29

## 2024-07-27 RX ADMIN — FUROSEMIDE 20 MG: 20 TABLET ORAL at 09:25

## 2024-07-27 RX ADMIN — HYDRALAZINE HYDROCHLORIDE 10 MG: 20 INJECTION INTRAMUSCULAR; INTRAVENOUS at 00:12

## 2024-07-27 RX ADMIN — CARVEDILOL 6.25 MG: 6.25 TABLET, FILM COATED ORAL at 09:24

## 2024-07-27 RX ADMIN — POTASSIUM CHLORIDE 10 MEQ: 750 TABLET, EXTENDED RELEASE ORAL at 09:25

## 2024-07-27 RX ADMIN — CEFAZOLIN 2000 MG: 2 INJECTION, POWDER, FOR SOLUTION INTRAVENOUS at 06:18

## 2024-07-27 RX ADMIN — ACETAMINOPHEN 650 MG: 325 TABLET, FILM COATED ORAL at 09:24

## 2024-07-27 RX ADMIN — LOSARTAN POTASSIUM 50 MG: 50 TABLET, FILM COATED ORAL at 09:25

## 2024-07-27 NOTE — OUTREACH NOTE
Prep Survey      Flowsheet Row Responses   [facility] patient discharged from? [facility]   Is LACE score < 7 ? Yes   Eligibility [facility]   Date of Admission 07/26/24   Date of Discharge 07/27/24   Discharge Disposition Home or Self Care   Discharge diagnosis Chronic systolic CHF (congestive heart failure)  [ICD SC new, Biotronik]   Does the patient have one of the following disease processes/diagnoses(primary or secondary)? CHF   Does the patient have Home health ordered? No   Is there a DME ordered? No   Medication alerts for this patient [place]   Prep survey completed? Yes            Maggie GÓMEZ - Registered Nurse

## 2024-07-27 NOTE — DISCHARGE SUMMARY
Date of Discharge:  7/27/2024    Discharge Diagnosis: chronic systolic CHF, status post implantation of ICD    Presenting Problem(s)  Active Hospital Problems    Diagnosis  POA    **Chronic systolic CHF (congestive heart failure) [I50.22]  Yes    Presence of implantable cardioverter-defibrillator (ICD) [Z95.810]  No      Resolved Hospital Problems   No resolved problems to display.          Hospital Course  John Morales is a 58 years-old male with a history of hypertension, hyperlipidemia, coronary artery disease status post PCI, and HFrEF secondary to nonischemic dilated cardiomyopathy. Cardiomyopathy was thought to be out of proportion to degree of coronary artery disease. The patient presented to [facility] on 7/26/2024 and underwent implantation of cardioverter-defibrillator by Dr. Calero. He was admitted for overnight observation.     Today (7/27/24) the patient's device was interrogated. He denies any complaints. He will be discharged home.         Procedures Performed    Procedure(s):  ICD SC new, [employer] DX lead REPS EMAILED  -------------------       Consults:   Consults       No orders found for last 30 day(s).            Pertinent Cardiac Test Results:     ECG:   ECG 12 Lead Rhythm Change   Preliminary Result   HEART RATE=48  bpm   RR Laphrbos=2533  ms   IL Pmywtnjh=425  ms   P Horizontal Axis=15  deg   P Front Axis=-11  deg   QRSD Interval=90  ms   QT Grtkwxir=960  ms   ZWyY=784  ms   QRS Axis=11  deg   T Wave Axis=13  deg   - ABNORMAL ECG -   Ventricular-paced complexes   Posterior infarct, old   Date and Time of Study:2024-07-27 00:59:57      Telemetry Scan   Final Result           Echocardiogram: Results for orders placed in visit on 02/20/24    Adult Transthoracic Echo Complete W/ Color, Spectral and Contrast if Necessary Per Protocol    Interpretation Summary    Left ventricular systolic function is moderately decreased. Calculated left ventricular EF = 30%    The left  ventricular cavity is moderate to severely dilated.    Left ventricular diastolic function is consistent with (grade I) impaired relaxation.    Estimated right ventricular systolic pressure from tricuspid regurgitation is mildly elevated (35-45 mmHg).    Mild mitral valve regurgitation is present      Stress Test: Results for orders placed during the hospital encounter of 02/01/24    Stress Test With Myocardial Perfusion One Day    Interpretation Summary    Findings consistent with a normal ECG stress test.    Left ventricular ejection fraction is moderately reduced (Calculated EF = 27%).    Myocardial perfusion imaging indicates a large-sized, severe area of ischemia located in the inferior wall.    Impressions are consistent with a high risk study.         Vital Signs  Temp:  [98 °F (36.7 °C)-99.2 °F (37.3 °C)] 98 °F (36.7 °C)  Heart Rate:  [56-83] 72  Resp:  [12-18] 18  BP: (105-201)/() 140/86    Physical Exam:  The patient is alert, oriented and in no distress.  Vital signs as noted above.  Head and neck revealed no carotid bruits or jugular venous distention.  No thyromegaly or lymph adenopathy is present  Lungs clear.  No wheezing.  Breath sounds are normal bilaterally.  Heart normal first and second heart sounds.  No precordial rub is present.  No gallop is present.  Abdomen soft and nontender.  No organomegaly is present.  Extremities with good peripheral pulses without any pedal edema.  Skin warm and dry.  Left upper chest incision approximated with steri strips in place.   Musculoskeletal system:  LUE in sling   CNS grossly normal    Condition on Discharge:  stable      Discharge Disposition:  Home or Self Care    Discharge Medications     Discharge Medications        New Medications        Instructions Start Date   cephalexin 500 MG capsule  Commonly known as: Keflex   500 mg, Oral, 2 Times Daily             Continue These Medications        Instructions Start Date   aspirin 81 MG EC tablet   81 mg,  Oral, Daily      carvedilol 6.25 MG tablet  Commonly known as: COREG   6.25 mg, Oral, 2 Times Daily With Meals      colchicine 0.6 MG tablet   0.6 mg, Oral, Daily      empagliflozin 10 MG tablet tablet  Commonly known as: JARDIANCE   10 mg, Oral, Daily      furosemide 20 MG tablet  Commonly known as: Lasix   20 mg, Oral, 2 Times Daily      losartan 50 MG tablet  Commonly known as: Cozaar   50 mg, Oral, Daily      potassium chloride 10 MEQ CR tablet   10 mEq, Oral, 2 Times Daily      rosuvastatin 10 MG tablet  Commonly known as: CRESTOR   10 mg, Oral, Nightly      sildenafil 50 MG tablet  Commonly known as: Viagra   One tablet as needed before sex and not to use if take nitrates      ticagrelor 90 MG tablet tablet  Commonly known as: BRILINTA   90 mg, Oral, 2 Times Daily                 Discharge Diet:   Diet Instructions       Diet: Cardiac Diets; Healthy Heart (2-3 Na+); Regular (IDDSI 7); Thin (IDDSI 0)      Discharge Diet: Cardiac Diets    Cardiac Diet: Healthy Heart (2-3 Na+)    Texture: Regular (IDDSI 7)    Fluid Consistency: Thin (IDDSI 0)            Activity at Discharge:   Activity Instructions       Activity as Tolerated      Bathing Restrictions      Type of Restriction: Bathing    Bathing Restrictions:  No Tub Bath  Other       Explain Bathing Restrictions: okay to shower tomorrow    Driving Restrictions      Type of Restriction: Driving    Driving Restrictions: No Driving (Time Limited)    Length: 2 Weeks    Lifting Restrictions      Type of Restriction: Lifting    Lifting Restrictions: Lifting Restriction (Indicate Limit)    Weight Limit (Pounds): 10    Length of Lifting Restriction: 2 weeks            Follow-up Appointments  Future Appointments   Date Time Provider Department Center   8/6/2024 10:30 AM [facility] DEVICE [facility]   12/16/2024  1:00 PM [facility] [place]   1/7/2025  1:30 PM Yaw Zavaleta MD [facility]     Additional Instructions for  the Follow-ups that You Need to Schedule       Call MD With Problems / Concerns   As directed      Instructions: Call [phone] for problems or concerns.    Order Comments: Instructions: Call [phone] for problems or concerns.         Discharge Follow-up with PCP   As directed       Currently Documented PCP:    Nora Sterling MD    PCP Phone Number:    [phone]     Follow Up Details: as needed                Time: Discharge 30 min    Electronically signed by KUN Thomas, 07/27/24, 10:15 AM EDT.

## 2024-07-27 NOTE — PLAN OF CARE
Goal Outcome Evaluation:    Pt is had an AICD placed yesterday.  During the night he became bradycardic and symptomatic. His HR reached 44 at times. Pt stated, he felt weak and vomited. EKG obtained and Dr Black was notified. MD stated he will see patient in the morning. Pt is currently sinus rhythm without complaints. AICD was interrogated this morning and the rate was adjusted to 50.

## 2024-07-28 LAB
QT INTERVAL: 461 MS
QTC INTERVAL: 414 MS

## 2024-07-29 ENCOUNTER — TRANSITIONAL CARE MANAGEMENT TELEPHONE ENCOUNTER (OUTPATIENT)
Dept: CALL CENTER | Facility: HOSPITAL | Age: 59
End: 2024-07-29
Payer: COMMERCIAL

## 2024-07-29 NOTE — OUTREACH NOTE
Call Center TCM Note      Flowsheet Row Responses   [facility] patient discharged from? [facility]   Does the patient have one of the following disease processes/diagnoses(primary or secondary)? CHF   TCM attempt successful? No   Unsuccessful attempts Attempt 2   Call Status Left message             June Walker RN    7/29/2024, 15:15 EDT

## 2024-07-29 NOTE — OUTREACH NOTE
Call Center TCM Note      Flowsheet Row Responses   [facility] patient discharged from? [facility]   Does the patient have one of the following disease processes/diagnoses(primary or secondary)? CHF   TCM attempt successful? No   Unsuccessful attempts Attempt 1   Call Status Left message             June Walker RN    7/29/2024, 11:39 EDT

## 2024-07-30 ENCOUNTER — TRANSITIONAL CARE MANAGEMENT TELEPHONE ENCOUNTER (OUTPATIENT)
Dept: CALL CENTER | Facility: HOSPITAL | Age: 59
End: 2024-07-30
Payer: COMMERCIAL

## 2024-07-30 NOTE — OUTREACH NOTE
Call Center TCM Note      Flowsheet Row Responses   [facility] facility patient discharged from? [facility]   Does the patient have one of the following disease processes/diagnoses(primary or secondary)? CHF   TCM attempt successful? No   Unsuccessful attempts Attempt 3             Jessica Morgan LPN    7/30/2024, 09:32 EDT

## 2024-08-02 RX ORDER — POTASSIUM CHLORIDE 750 MG/1
10 TABLET, FILM COATED, EXTENDED RELEASE ORAL 2 TIMES DAILY
Qty: 180 TABLET | Refills: 1 | Status: SHIPPED | OUTPATIENT
Start: 2024-08-02

## 2024-08-02 NOTE — TELEPHONE ENCOUNTER
Rx Refill Note  Requested Prescriptions      No prescriptions requested or ordered in this encounter      Last office visit with prescribing clinician: 2/22/2024   Last telemedicine visit with prescribing clinician: Visit date not found   Next office visit with prescribing clinician: Visit date not found                         Would you like a call back once the refill request has been completed: [] Yes [] No    If the office needs to give you a call back, can they leave a voicemail: [] Yes [] No    Antonia Ellis, PCT  08/02/24, 11:31 EDT

## 2024-08-06 ENCOUNTER — CLINICAL SUPPORT NO REQUIREMENTS (OUTPATIENT)
Dept: CARDIOLOGY | Facility: CLINIC | Age: 59
End: 2024-08-06
Payer: COMMERCIAL

## 2024-08-06 ENCOUNTER — TELEPHONE (OUTPATIENT)
Dept: CARDIOLOGY | Facility: CLINIC | Age: 59
End: 2024-08-06
Payer: COMMERCIAL

## 2024-08-06 DIAGNOSIS — I42.8 NICM (NONISCHEMIC CARDIOMYOPATHY): ICD-10-CM

## 2024-08-06 DIAGNOSIS — Z95.810 PRESENCE OF IMPLANTABLE CARDIOVERTER-DEFIBRILLATOR (ICD): ICD-10-CM

## 2024-08-06 DIAGNOSIS — I50.22 CHRONIC SYSTOLIC CHF (CONGESTIVE HEART FAILURE): Primary | ICD-10-CM

## 2024-08-06 PROCEDURE — 93282 PRGRMG EVAL IMPLANTABLE DFB: CPT | Performed by: INTERNAL MEDICINE

## 2024-08-06 NOTE — TELEPHONE ENCOUNTER
Called patient and scheduled 1 month f/u 9/9 with Liv. Had to cancel 1/7 apt with Dr. Zavaleta because it was at [facility] and today 8/6 is his last day in [place]. Rescheduled to 1/15/25 with device check.

## 2024-08-06 NOTE — TELEPHONE ENCOUNTER
Patient was in office today s/p ICD check, normal device function and incision.    Patient taking Losartan, can he take Viagra with this medication.    He has an appt 1/7/25, do you want to see him sooner?

## 2024-08-06 NOTE — TELEPHONE ENCOUNTER
Spoke to patient, may take both medications.     Scheduling to call patient for 1 month f/u with Liv, please schedule device check with January OV Dr Zavaleta as well. Thanks

## 2024-08-06 NOTE — PROGRESS NOTES
Patient is here today s/p ICD placement. Interrogation of device is normal, lead stable. Removed steri strips with no issues, incision is healed, no redness, swelling or drainage noted. Patient teaching r/t left arm precautions and incision care. Patient may drive. Encouraged him to call [facility] to check safety with equipment such as welding. Patient  has f/u scheduled, will check with Dr Zavaleta schedule and medication questions.

## 2024-08-06 NOTE — TELEPHONE ENCOUNTER
He is okay to take Viagra with losartan. Also, he should follow up with me in 1 month, then with Dr. Zavaleta as scheduled in Jan. 2025.

## 2024-09-08 PROBLEM — I34.0 NONRHEUMATIC MITRAL VALVE REGURGITATION: Status: ACTIVE | Noted: 2024-05-20

## 2024-09-08 NOTE — PROGRESS NOTES
Subjective:     Encounter Date:09/09/2024        Patient ID: John Morales [id number]     Chief Complaint:  hospital discharge follow-up    History of Present Illness    John Morales is a 58 y.o. male with a history of chronic heart failure with reduced ejection fraction secondary to nonischemic cardiomyopathy, coronary artery disease status post stent placement, hypertension, and hyperlipidemia who underwent implantation of a single-chamber ICD with atrial sensing capabilities on 7/27/2024. He presents to the office today for hospital discharge follow-up. He is complaining of blurred vision. He states his right eye is always blurry since he had Shingles in his eye, but he states his left eye blurriness is new. He states he is trying to get an appointment with his eye doctor. He reports recent fatigue, but denies any chest pain. He reports occasional shortness of breath. He denies any exacerbating or alleviating factors. He denies any leg swelling. He states he had stopped weighing himself daily at home since his legs haven't been swollen. His blood pressure is elevated in the office, but he states he hasn't taken his medications today. He states he does not have a blood pressure monitor at home, but he can get his blood pressure checked at work.       Review of systems:  Review of Systems   Constitutional: Positive for malaise/fatigue.   Cardiovascular:  Negative for chest pain, dyspnea on exertion, leg swelling and palpitations.   Respiratory:  Positive for shortness of breath. Negative for cough.    Gastrointestinal:  Negative for abdominal pain, nausea and vomiting.   Neurological:  Negative for dizziness, focal weakness, headaches, light-headedness and numbness.   All other systems reviewed and are negative.        The following portions of the patient's history were reviewed and updated as appropriate: allergies, current medications, past family history, past medical history, past social history,  past surgical history and problem list.    Past Medical History:  Past Medical History:   Diagnosis Date    Ankle fracture     right ankle    Arthritis     Gout    Back injury 07/01/2007    broke    Nonrheumatic mitral valve regurgitation 05/20/2024    Other hyperlipidemia 02/09/2024       Past Surgical History:  Past Surgical History:   Procedure Laterality Date    ARM WOUND REPAIR / CLOSURE Left 2014    infection in bone    CARDIAC CATHETERIZATION Right 2/4/2024    Procedure: Right and Left Heart Cath;  Surgeon: Yaw Zavaleta MD;  Location: [facility] CATH INVASIVE LOCATION;  Service: Cardiovascular;  Laterality: Right;    CARDIAC ELECTROPHYSIOLOGY PROCEDURE N/A 7/26/2024    Procedure: ICD SC new, Biotronik DX lead REPS EMAILED;  Surgeon: Julianne Calero MD;  Location: [facility] CATH INVASIVE LOCATION;  Service: Cardiovascular;  Laterality: N/A;    COLONOSCOPY N/A 9/26/2019    Procedure: COLONOSCOPY WITH POLYPECTOMY X 2,;  Surgeon: Sukhdev Silva MD;  Location: [facility] MAIN OR;  Service: Gastroenterology    MASS EXCISION N/A 9/26/2019    Procedure: Excision of perianal skin tags;  Surgeon: Robert Stinson MD;  Location: [facility] MAIN OR;  Service: General    SKIN TAG REMOVAL          Allergies:  No Known Allergies    Current Meds:     Current Outpatient Medications:     aspirin 81 MG EC tablet, Take 1 tablet by mouth Daily., Disp: 90 tablet, Rfl: 1    carvedilol (COREG) 6.25 MG tablet, Take 1 tablet by mouth 2 (Two) Times a Day With Meals., Disp: 180 tablet, Rfl: 0    colchicine 0.6 MG tablet, Take 1 tablet by mouth Daily., Disp: , Rfl:     empagliflozin (JARDIANCE) 10 MG tablet tablet, Take 1 tablet by mouth Daily., Disp: 90 tablet, Rfl: 0    furosemide (Lasix) 20 MG tablet, Take 1 tablet by mouth 2 (Two) Times a Day., Disp: 180 tablet, Rfl: 0    losartan (Cozaar) 50 MG tablet, Take 1 tablet by mouth Daily., Disp: 90 tablet, Rfl: 3    potassium chloride 10 MEQ CR tablet, Take 1 tablet by mouth 2  "(Two) Times a Day., Disp: 180 tablet, Rfl: 1    rosuvastatin (CRESTOR) 10 MG tablet, Take 1 tablet by mouth Every Night., Disp: 90 tablet, Rfl: 0    sildenafil (Viagra) 50 MG tablet, One tablet as needed before sex and not to use if take nitrates, Disp: 12 tablet, Rfl: 3    ticagrelor (BRILINTA) 90 MG tablet tablet, Take 1 tablet by mouth 2 (Two) Times a Day., Disp: 180 tablet, Rfl: 1    Social History:   Social History     Tobacco Use    Smoking status: Never    Smokeless tobacco: Never   Substance Use Topics    Alcohol use: No        Family History:  Family History   Problem Relation Age of Onset    No Known Problems Mother     No Known Problems Father     No Known Problems Sister     No Known Problems Brother                  Objective:       Vitals reviewed.   Constitutional:       Appearance: Healthy appearance. Well-developed and well-groomed.   Eyes:      Conjunctiva/sclera: Conjunctivae normal.      Pupils: Pupils are equal, round, and reactive to light.   HENT:      Head: Normocephalic and atraumatic.    Mouth/Throat:      Mouth: Mucous membranes are moist.      Pharynx: Oropharynx is clear.   Pulmonary:      Effort: Pulmonary effort is normal.      Breath sounds: Normal breath sounds.   Cardiovascular:      PMI at left midclavicular line. Normal rate. Regular rhythm.   Pulses:     Intact distal pulses.   Edema:     Peripheral edema absent.   Abdominal:      General: Bowel sounds are normal.      Palpations: Abdomen is soft.   Musculoskeletal: Normal range of motion.      Cervical back: Normal range of motion. Skin:     General: Skin is warm and dry.      Comments: Left upper chest surgical incision healed.    Neurological:      Mental Status: Alert and oriented to person, place, and time.   Psychiatric:         Mood and Affect: Mood normal.         Behavior: Behavior normal.         /84 (BP Location: Right arm, Patient Position: Sitting, Cuff Size: Adult)   Pulse 79   Ht 171.5 cm (67.5\")   Wt 78 " kg (172 lb)   SpO2 98%   BMI 26.54 kg/m²       Lab Reviewed:     CBC        BMP        CMP       BNP        TROPONIN        CoAg        Creatinine Clearance  CrCl cannot be calculated (Patient's most recent lab result is older than the maximum 30 days allowed.).    ABG        Radiology  No radiology results for the last day    Cardiology      Stress test  Results for orders placed during the hospital encounter of 02/01/24    Stress Test With Myocardial Perfusion One Day    Interpretation Summary    Findings consistent with a normal ECG stress test.    Left ventricular ejection fraction is moderately reduced (Calculated EF = 27%).    Myocardial perfusion imaging indicates a large-sized, severe area of ischemia located in the inferior wall.    Impressions are consistent with a high risk study.      Echocardiogram  Results for orders placed in visit on 02/20/24    Adult Transthoracic Echo Complete W/ Color, Spectral and Contrast if Necessary Per Protocol    Interpretation Summary    Left ventricular systolic function is moderately decreased. Calculated left ventricular EF = 30%    The left ventricular cavity is moderate to severely dilated.    Left ventricular diastolic function is consistent with (grade I) impaired relaxation.    Estimated right ventricular systolic pressure from tricuspid regurgitation is mildly elevated (35-45 mmHg).    Mild mitral valve regurgitation is present      Cardiac catheterization  Results for orders placed during the hospital encounter of 02/01/24    Cardiac Catheterization/Vascular Study    Conclusion  OPERATORS  Yaw Zavaleta M.D. (Attending Cardiologist)      PROCEDURE PERFORMED  Ultrasound guided vascular access  Right heart Catheterization  Left Heart Catheterization  Coronary Angiogram 16251  PCI with VELASQUEZ placement RCA 79353  IVUS of RCA 19942  Moderate Sedation 45 minutes    INDICATIONS FOR PROCEDURE  58-year-old man who presented with HFrEF exacerbation and was noted to have  minimally elevated high-sensitivity troponin and abnormal nuclear stress test in the inferior wall.  He also has pulmonary hypertension.  After discussing the risk and benefit of the procedure he was brought in for right and left heart cath.    PROCEDURE IN DETAIL  Informed consent was obtained from the patient after explaining the risks, benefits, and alternative options of the procedure. After obtaining informed consent, the patient was brought to the cath lab and was prepped in a sterile fashion. Lidocaine 1% was used for local anesthesia into the right IJ access site. Right IJ vein was accessed using the micropuncture needle under ultrasound guidance and micropuncture wire advanced under flouroscopy. A 7 Ethiopian vascular sheath was put into place percutaneously over guide-wire. Guide wires were removed. A 7Fr swan yari catheter was advanced to wedge position. RA, RV and PA and wedge pressures were recorded. PA sat and arterial sats recorded and the swan was subsequently removed in its entirety. Next, The right radial artery was accessed with an angiocath needle under ultrasound guidance. A 6F slendersheath was inserted successfully.  Afterwards, 6F JR4 diagnostic catheter was advanced over a wire into the ascending aorta and was used to cross the AV and obtain LV pressures.  Gradient across the AV measured via pullback technique.  Next, 6F JL3.5 and JR4 catheters were used to engage the ostia of the left main and RCA respectively. Images of the right and left coronary systems were obtained.    CORONARY ANGIOGRAM FINDINGS:  Dominance: Right    #Left main: Left main is a large vessel Which gives rise to the LAD left circumflex artery.  Left main is angiographically free from any significant disease.    #Left Anterior Descending Artery: LAD is a large caliber vessel which gives rise to several septal perforators and several diagonal branches.  Mid LAD has tandem 50% stenosis.    #Left Circumflex: The LCx is a large,  non-dominant vessel which gives rise to OM branches.  Mid left circumflex has 40% stenosis, OM1 has 50% stenosis.    #Right Coronary Artery: The RCA is a large, dominant vessel which gives rise to several small caliber branches and the PDA and PLV.  Distal RCA had 90% focal stenosis.  SASCHA-3 flow.  Lesion length 10 mm.    Percutaneous coronary intervention  100 units/kg of heparin was administered and ACT of more than 250 was documented.  I used a 6 Telugu JR4 guide catheter to engage the ostium of right coronary artery.  A 0.014 run-through wire was then advanced past the lesion into the distal RCA.  I then advanced intravascular ultrasound catheter and performed a slow manual pullback from distal to mid RCA.  Reference vessel was 3 mm and at the lesion the vessel measured 1.2 mm.  Noncalcific fibro atherosclerotic 90% lesion was confirmed with IVUS visualization.  I then placed a 2.75 x 18 mm Xience don point stent which was postdilated with 3 x 15 mm noncompliant balloon at 16 tracy.  Final angiography showed 0% stenosis in the RCA with SASCHA-3 flow.    The catheters were exchanged over a wire and subsequently removed. The patient tolerated the procedure well without any complications. The pictures were reviewed at the end of the procedure. A TR band was applied.      [facility] HEMODYNAMICS:  LVp 98/7, 11 mmHg  AOp 95/69, 79 mmHg  No significant gradient on pullback.  LV gram was not performed due to recent echocardiogram on file.    [facility] HEMODYNAMICS:  RA 7/5, 5 mmHg  RV 31/4, 7 mmHg  PA 33/12, 22 mmHg  PCW 16/11, 12 mmHg  AO Sat 97%  PA Sat 70%    Jesus CO 4.37 liters per minute    Jesus CI 2.32 liters per minute per meter square    SVR: 1335 dynes-sec/cm5 (normal 700-1600)  PVR: 183 dynes-sec/cm5 (normal )    ESTIMATED BLOOD LOSS:  10 ml    COMPLICATIONS:  None    PROCEDURE DATA:  Contrast Used:40 cc  Fluoro Time: 3.12 mins  Sedation Time:    IMPRESSIONS  IVUS guided PCI to distal RCA with VELASQUEZ placement  LVEDP and  wedge pressure are normal  Nonischemic cardiomyopathy (cardiomyopathy out of proportion to coronary disease)  Mild pulmonary hypertension    RECOMMENDATIONS  -- Start dual antiplatelet therapy and high intensity statin  -- Uptitrate GDMT for cardiomyopathy  --Risk factor and lifestyle modifications  -- Stop IV diuretics and switch to oral    Electronically signed by Yaw Zavaleta MD, 02/04/24, 1:26 PM EST.               Assessment:         Diagnoses and all orders for this visit:    1. Chronic systolic CHF (congestive heart failure) (Primary)    2. NICM (nonischemic cardiomyopathy)    3. Presence of implantable cardioverter-defibrillator (ICD)    4. Nonrheumatic mitral valve regurgitation    5. Coronary artery disease involving native coronary artery of native heart without angina pectoris    6. Other hyperlipidemia    7. Primary hypertension    8. Hospital discharge follow-up    Other orders  -     colchicine 0.6 MG tablet; Take 1 tablet by mouth Daily.                Plan:       Chronic systolic CHF (congestive heart failure) secondary to NICM (nonischemic cardiomyopathy)  Presence of implantable cardioverter-defibrillator (ICD)  Last echocardiogram showed LVEF 30% with grade I diastolic dysfunction  Status post implantation of single-chamber Biotronik ICD with atrial sensing capabilities on 7/27/24  Patient appears euvolemic   Continue Lasix, Jardiance, Coreg, and losartan  Patient advised to weigh himself daily. He will notify us if he gains 2-3 lbs in 1 day or 5 lbs in 1 week.     Nonrheumatic mitral valve regurgitation  Mild per last echocardiogram   Repeat echocardiogram scheduled on 12/16/24    Coronary artery disease involving native coronary artery of native heart without angina pectoris  History of VELASQUEZ stent placement to distal RCA on 2/4/24  Continue aspirin, Brilinta, and high intensity statin  Continue beta blocker     Other hyperlipidemia  Continue rosuvastatin     Primary hypertension, chronic  Blood  pressure is elevated, but patient states he hasn't taken his medications today  Continue carvedilol, Lasix, and losartan   Patient advised to monitor his blood pressure daily and keep a log    Gout  On colchicine    Hospital discharge follow-up  Patient encouraged to weigh himself and check his blood pressure daily.  He was also advised to follow up with his ophthalmologist for further evaluation of eye blurriness given history of ophthalmic shingles.      Electronically signed by KUN Thomas, 09/09/24, 10:51 AM EDT.

## 2024-09-09 ENCOUNTER — OFFICE VISIT (OUTPATIENT)
Dept: CARDIOLOGY | Facility: CLINIC | Age: 59
End: 2024-09-09
Payer: COMMERCIAL

## 2024-09-09 VITALS
HEART RATE: 79 BPM | BODY MASS INDEX: 26.07 KG/M2 | OXYGEN SATURATION: 98 % | SYSTOLIC BLOOD PRESSURE: 153 MMHG | DIASTOLIC BLOOD PRESSURE: 84 MMHG | WEIGHT: 172 LBS | HEIGHT: 68 IN

## 2024-09-09 DIAGNOSIS — I25.10 CORONARY ARTERY DISEASE INVOLVING NATIVE CORONARY ARTERY OF NATIVE HEART WITHOUT ANGINA PECTORIS: Chronic | ICD-10-CM

## 2024-09-09 DIAGNOSIS — I42.8 NICM (NONISCHEMIC CARDIOMYOPATHY): Chronic | ICD-10-CM

## 2024-09-09 DIAGNOSIS — I50.22 CHRONIC SYSTOLIC CHF (CONGESTIVE HEART FAILURE): Primary | Chronic | ICD-10-CM

## 2024-09-09 DIAGNOSIS — I34.0 NONRHEUMATIC MITRAL VALVE REGURGITATION: ICD-10-CM

## 2024-09-09 DIAGNOSIS — E78.49 OTHER HYPERLIPIDEMIA: Chronic | ICD-10-CM

## 2024-09-09 DIAGNOSIS — Z09 HOSPITAL DISCHARGE FOLLOW-UP: ICD-10-CM

## 2024-09-09 DIAGNOSIS — I10 PRIMARY HYPERTENSION: Chronic | ICD-10-CM

## 2024-09-09 DIAGNOSIS — Z95.810 PRESENCE OF IMPLANTABLE CARDIOVERTER-DEFIBRILLATOR (ICD): Chronic | ICD-10-CM

## 2024-09-09 PROCEDURE — 99214 OFFICE O/P EST MOD 30 MIN: CPT | Performed by: NURSE PRACTITIONER

## 2024-09-09 RX ORDER — COLCHICINE 0.6 MG/1
0.6 TABLET ORAL DAILY
Start: 2024-09-09

## 2024-09-13 ENCOUNTER — TELEPHONE (OUTPATIENT)
Dept: CARDIOLOGY | Facility: CLINIC | Age: 59
End: 2024-09-13
Payer: COMMERCIAL

## 2024-09-13 NOTE — TELEPHONE ENCOUNTER
[facility]ACILITY: [facility]   DR: Gerber Serrato   PHONE: [phone]  FAX: [phone]  PROCEDURE: [facility]   SCHEDULED: TBD  MEDS TO HOLD: ASA

## 2024-11-06 PROCEDURE — 93295 DEV INTERROG REMOTE 1/2/MLT: CPT | Performed by: INTERNAL MEDICINE

## 2024-11-06 PROCEDURE — 93296 REM INTERROG EVL PM/IDS: CPT | Performed by: INTERNAL MEDICINE

## 2024-12-11 ENCOUNTER — TELEPHONE (OUTPATIENT)
Dept: FAMILY MEDICINE CLINIC | Facility: CLINIC | Age: 59
End: 2024-12-11
Payer: COMMERCIAL

## 2024-12-11 NOTE — TELEPHONE ENCOUNTER
OKAY FOR HUB TO RELAY- CALLED PT TO SEE WHAT HE NEEDED FROM WHERE HE CALLED US ON 12/4/2024 BUT DIDN'T STATE WHAT HE NEEDED AT THAT TIME. LM WITH DETAILS AND CB #

## 2024-12-11 NOTE — TELEPHONE ENCOUNTER
Patient called after hours system 12/4/24. He did not state what he was look for. Please call patient and ask what he needed.

## 2024-12-16 ENCOUNTER — HOSPITAL ENCOUNTER (OUTPATIENT)
Dept: CARDIOLOGY | Facility: HOSPITAL | Age: 59
Discharge: HOME | End: 2024-12-16
Admitting: INTERNAL MEDICINE
Payer: COMMERCIAL

## 2024-12-16 VITALS
WEIGHT: 172 LBS | BODY MASS INDEX: 26.07 KG/M2 | HEIGHT: 68 IN | SYSTOLIC BLOOD PRESSURE: 157 MMHG | DIASTOLIC BLOOD PRESSURE: 97 MMHG

## 2024-12-16 DIAGNOSIS — I50.21 ACUTE HFREF (HEART FAILURE WITH REDUCED EJECTION FRACTION): ICD-10-CM

## 2024-12-16 LAB
BH CV ECHO MEAS - ACS: 1.21 CM
BH CV ECHO MEAS - AO MAX PG: 5.6 MMHG
BH CV ECHO MEAS - AO MEAN PG: 2.6 MMHG
BH CV ECHO MEAS - AO ROOT DIAM: 2.8 CM
BH CV ECHO MEAS - AO V2 MAX: 117.9 CM/SEC
BH CV ECHO MEAS - AO V2 VTI: 21.5 CM
BH CV ECHO MEAS - AVA(I,D): 2.5 CM2
BH CV ECHO MEAS - EDV(CUBED): 219.9 ML
BH CV ECHO MEAS - EDV(MOD-SP4): 174.5 ML
BH CV ECHO MEAS - EF(MOD-BP): 20 %
BH CV ECHO MEAS - EF(MOD-SP4): 25 %
BH CV ECHO MEAS - ESV(CUBED): 164.2 ML
BH CV ECHO MEAS - ESV(MOD-SP4): 125 ML
BH CV ECHO MEAS - FS: 9.3 %
BH CV ECHO MEAS - IVS/LVPW: 0.91 CM
BH CV ECHO MEAS - IVSD: 0.91 CM
BH CV ECHO MEAS - LA DIMENSION: 4 CM
BH CV ECHO MEAS - LV DIASTOLIC VOL/BSA (35-75): 91.5 CM2
BH CV ECHO MEAS - LV MASS(C)D: 233.9 GRAMS
BH CV ECHO MEAS - LV MAX PG: 3.5 MMHG
BH CV ECHO MEAS - LV MEAN PG: 1.73 MMHG
BH CV ECHO MEAS - LV SYSTOLIC VOL/BSA (12-30): 65.6 CM2
BH CV ECHO MEAS - LV V1 MAX: 93.5 CM/SEC
BH CV ECHO MEAS - LV V1 VTI: 17.2 CM
BH CV ECHO MEAS - LVIDD: 6 CM
BH CV ECHO MEAS - LVIDS: 5.5 CM
BH CV ECHO MEAS - LVOT AREA: 3.2 CM2
BH CV ECHO MEAS - LVOT DIAM: 2.01 CM
BH CV ECHO MEAS - LVPWD: 0.99 CM
BH CV ECHO MEAS - MR MAX PG: 146.1 MMHG
BH CV ECHO MEAS - MR MAX VEL: 603.3 CM/SEC
BH CV ECHO MEAS - MV A MAX VEL: 89.6 CM/SEC
BH CV ECHO MEAS - MV DEC SLOPE: 175.6 CM/SEC2
BH CV ECHO MEAS - MV DEC TIME: 0.26 SEC
BH CV ECHO MEAS - MV E MAX VEL: 45 CM/SEC
BH CV ECHO MEAS - MV E/A: 0.5
BH CV ECHO MEAS - MV MAX PG: 2.6 MMHG
BH CV ECHO MEAS - MV MEAN PG: 1.11 MMHG
BH CV ECHO MEAS - MV V2 VTI: 20.5 CM
BH CV ECHO MEAS - MVA(VTI): 2.7 CM2
BH CV ECHO MEAS - PA ACC TIME: 0.15 SEC
BH CV ECHO MEAS - PA V2 MAX: 68.6 CM/SEC
BH CV ECHO MEAS - PI END-D VEL: 76.8 CM/SEC
BH CV ECHO MEAS - PULM A REVS DUR: 0.08 SEC
BH CV ECHO MEAS - PULM A REVS VEL: 24.3 CM/SEC
BH CV ECHO MEAS - PULM DIAS VEL: 46.9 CM/SEC
BH CV ECHO MEAS - PULM S/D: 0.74
BH CV ECHO MEAS - PULM SYS VEL: 34.8 CM/SEC
BH CV ECHO MEAS - RAP SYSTOLE: 3 MMHG
BH CV ECHO MEAS - RVSP: 29.8 MMHG
BH CV ECHO MEAS - SV(LVOT): 54.8 ML
BH CV ECHO MEAS - SV(MOD-SP4): 49.4 ML
BH CV ECHO MEAS - SVI(LVOT): 28.7 ML/M2
BH CV ECHO MEAS - SVI(MOD-SP4): 25.9 ML/M2
BH CV ECHO MEAS - TAPSE (>1.6): 2.3 CM
BH CV ECHO MEAS - TR MAX PG: 26.8 MMHG
BH CV ECHO MEAS - TR MAX VEL: 243.9 CM/SEC
BH CV XLRA - RV BASE: 3.5 CM
BH CV XLRA - RV MID: 1.7 CM

## 2024-12-16 PROCEDURE — 93306 TTE W/DOPPLER COMPLETE: CPT

## 2025-01-09 NOTE — PROGRESS NOTES
Encounter Date:01/15/2025        Patient ID: John Morales is a 59 y.o. male.      Chief Complaint:      History of Present Illness  John Morales is a 59 y.o. male with a history of chronic heart failure with reduced ejection fraction secondary to nonischemic cardiomyopathy, coronary artery disease status post stent placement, hypertension, and hyperlipidemia who underwent implantation of a single-chamber ICD with atrial sensing capabilities on 7/27/2024.   He presents for follow-up.     Current cardiac medications include aspirin, Coreg, Jardiance, Lasix, losartan, Crestor and Brilinta.    The following portions of the patient's history were reviewed and updated as appropriate: allergies, current medications, past family history, past medical history, past social history, past surgical history, and problem list.    Review of Systems   Constitutional: Negative for malaise/fatigue.   Cardiovascular:  Positive for leg swelling. Negative for chest pain, dyspnea on exertion and palpitations.   Respiratory:  Positive for shortness of breath. Negative for cough.    Gastrointestinal:  Negative for abdominal pain, nausea and vomiting.   Neurological:  Positive for dizziness and light-headedness. Negative for focal weakness, headaches and numbness.   All other systems reviewed and are negative.        Current Outpatient Medications:     aspirin 81 MG EC tablet, Take 1 tablet by mouth Daily., Disp: 90 tablet, Rfl: 1    carvedilol (COREG) 6.25 MG tablet, Take 1 tablet by mouth 2 (Two) Times a Day With Meals., Disp: 180 tablet, Rfl: 0    colchicine 0.6 MG tablet, Take 1 tablet by mouth Daily., Disp: 30 tablet, Rfl: 0    empagliflozin (JARDIANCE) 10 MG tablet tablet, Take 1 tablet by mouth Daily., Disp: 90 tablet, Rfl: 0    furosemide (Lasix) 20 MG tablet, Take 1 tablet by mouth 2 (Two) Times a Day., Disp: 180 tablet, Rfl: 0    losartan (Cozaar) 50 MG tablet, Take 1 tablet by mouth Daily., Disp: 90 tablet, Rfl: 3     potassium chloride 10 MEQ CR tablet, Take 1 tablet by mouth 2 (Two) Times a Day., Disp: 180 tablet, Rfl: 1    rosuvastatin (CRESTOR) 10 MG tablet, Take 1 tablet by mouth Every Night., Disp: 90 tablet, Rfl: 0    sildenafil (Viagra) 50 MG tablet, One tablet as needed before sex and not to use if take nitrates, Disp: 12 tablet, Rfl: 3    ticagrelor (BRILINTA) 90 MG tablet tablet, Take 1 tablet by mouth 2 (Two) Times a Day., Disp: 180 tablet, Rfl: 1    Current outpatient and discharge medications have been reconciled for the patient.  Reviewed by: Yaw Zavaleta MD       No Known Allergies    Family History   Problem Relation Age of Onset    No Known Problems Mother     No Known Problems Father     No Known Problems Sister     No Known Problems Brother        Past Surgical History:   Procedure Laterality Date    ARM WOUND REPAIR / CLOSURE Left 2014    infection in bone    CARDIAC CATHETERIZATION Right 2/4/2024    Procedure: Right and Left Heart Cath;  Surgeon: Yaw Zavaleta MD;  Location: [facility] CATH INVASIVE LOCATION;  Service: Cardiovascular;  Laterality: Right;    CARDIAC ELECTROPHYSIOLOGY PROCEDURE N/A 7/26/2024    Procedure: ICD SC new, Biotronik DX lead REPS EMAILED;  Surgeon: Julianne Calero MD;  Location: [facility] CATH INVASIVE LOCATION;  Service: Cardiovascular;  Laterality: N/A;    COLONOSCOPY N/A 9/26/2019    Procedure: COLONOSCOPY WITH POLYPECTOMY X 2,;  Surgeon: Sukhdev Silva MD;  Location: [facility] MAIN OR;  Service: Gastroenterology    MASS EXCISION N/A 9/26/2019    Procedure: Excision of perianal skin tags;  Surgeon: Robert Stinson MD;  Location: [facility] MAIN OR;  Service: General    SKIN TAG REMOVAL         Past Medical History:   Diagnosis Date    Ankle fracture     right ankle    Arthritis     Gout    Back injury 07/01/2007    broke    Nonrheumatic mitral valve regurgitation 05/20/2024    Other hyperlipidemia 02/09/2024       Family History   Problem Relation Age of Onset    No  "Known Problems Mother     No Known Problems Father     No Known Problems Sister     No Known Problems Brother        Social History     Socioeconomic History    Marital status: Single   Tobacco Use    Smoking status: Never     Passive exposure: Past    Smokeless tobacco: Never   Vaping Use    Vaping status: Never Used   Substance and Sexual Activity    Alcohol use: Not Currently    Drug use: Never    Sexual activity: Defer               Objective:       Physical Exam    BP (!) 167/104 (BP Location: Right arm, Patient Position: Sitting, Cuff Size: Adult)   Pulse 72   Ht 171.5 cm (67.5\")   Wt 84.8 kg (187 lb)   SpO2 98%   BMI 28.86 kg/m²   The patient is alert, oriented and in no distress.    Vital signs as noted above.    Head and neck revealed no carotid bruits or jugular venous distension.  No thyromegaly or lymphadenopathy is present.    Lungs clear.  No wheezing.  Breath sounds are normal bilaterally.    Heart normal first and second heart sounds.  No murmur..  No pericardial rub is present.  No gallop is present.    Abdomen soft and nontender.  No organomegaly is present.    Extremities revealed good peripheral pulses without any pedal edema.    Skin warm and dry.    Musculoskeletal system is grossly normal.    CNS grossly normal.           Diagnosis Plan   1. Acute HFrEF (heart failure with reduced ejection fraction)  losartan (Cozaar) 50 MG tablet      2. Chronic systolic CHF (congestive heart failure)        3. Presence of implantable cardioverter-defibrillator (ICD)        4. Coronary artery disease involving native coronary artery of native heart without angina pectoris        5. Nonrheumatic mitral valve regurgitation        6. Other hyperlipidemia        7. Primary hypertension        8. Hospital discharge follow-up        9. S/P drug eluting coronary stent placement        10. Mixed hyperlipidemia        LAB RESULTS (LAST 7 DAYS)    CBC        BMP        CMP         BNP        TROPONIN        CoAg   "      Creatinine Clearance  CrCl cannot be calculated (Patient's most recent lab result is older than the maximum 30 days allowed.).    ABG        Radiology  No radiology results for the last day    EKG  Procedures    Stress test  Results for orders placed during the hospital encounter of 02/01/24    Stress Test With Myocardial Perfusion One Day    Interpretation Summary    Findings consistent with a normal ECG stress test.    Left ventricular ejection fraction is moderately reduced (Calculated EF = 27%).    Myocardial perfusion imaging indicates a large-sized, severe area of ischemia located in the inferior wall.    Impressions are consistent with a high risk study.      Echocardiogram  Results for orders placed during the hospital encounter of 12/16/24    Adult Transthoracic Echo Complete W/ Color, Spectral and Contrast if Necessary Per Protocol    Interpretation Summary    Left ventricular ejection fraction appears to be 21 - 25%.    The left ventricular cavity is moderate to severely dilated.    Left ventricular diastolic function is consistent with (grade Ia w/high LAP) impaired relaxation.    The left atrial cavity is dilated.    Estimated right ventricular systolic pressure from tricuspid regurgitation is normal (<35 mmHg).    Mild mitral valve regurgitation is present.      Cardiac catheterization  Results for orders placed during the hospital encounter of 02/01/24    Cardiac Catheterization/Vascular Study    Conclusion  OPERATORS  Yaw Zavaleta M.D. (Attending Cardiologist)      PROCEDURE PERFORMED  Ultrasound guided vascular access  Right heart Catheterization  Left Heart Catheterization  Coronary Angiogram 40958  PCI with VELASQUEZ placement RCA 55396  IVUS of RCA 07891  Moderate Sedation 45 minutes    INDICATIONS FOR PROCEDURE  58-year-old man who presented with HFrEF exacerbation and was noted to have minimally elevated high-sensitivity troponin and abnormal nuclear stress test in the inferior wall.  He also  has pulmonary hypertension.  After discussing the risk and benefit of the procedure he was brought in for right and left heart cath.    PROCEDURE IN DETAIL  Informed consent was obtained from the patient after explaining the risks, benefits, and alternative options of the procedure. After obtaining informed consent, the patient was brought to the cath lab and was prepped in a sterile fashion. Lidocaine 1% was used for local anesthesia into the right IJ access site. Right IJ vein was accessed using the micropuncture needle under ultrasound guidance and micropuncture wire advanced under flouroscopy. A 7 Mexican vascular sheath was put into place percutaneously over guide-wire. Guide wires were removed. A 7Fr swan yari catheter was advanced to wedge position. RA, RV and PA and wedge pressures were recorded. PA sat and arterial sats recorded and the swan was subsequently removed in its entirety. Next, The right radial artery was accessed with an angiocath needle under ultrasound guidance. A 6F slendersheath was inserted successfully.  Afterwards, 6F JR4 diagnostic catheter was advanced over a wire into the ascending aorta and was used to cross the AV and obtain LV pressures.  Gradient across the AV measured via pullback technique.  Next, 6F JL3.5 and JR4 catheters were used to engage the ostia of the left main and RCA respectively. Images of the right and left coronary systems were obtained.    CORONARY ANGIOGRAM FINDINGS:  Dominance: Right    #Left main: Left main is a large vessel Which gives rise to the LAD left circumflex artery.  Left main is angiographically free from any significant disease.    #Left Anterior Descending Artery: LAD is a large caliber vessel which gives rise to several septal perforators and several diagonal branches.  Mid LAD has tandem 50% stenosis.    #Left Circumflex: The LCx is a large, non-dominant vessel which gives rise to OM branches.  Mid left circumflex has 40% stenosis, OM1 has 50%  stenosis.    #Right Coronary Artery: The RCA is a large, dominant vessel which gives rise to several small caliber branches and the PDA and PLV.  Distal RCA had 90% focal stenosis.  SASCHA-3 flow.  Lesion length 10 mm.    Percutaneous coronary intervention  100 units/kg of heparin was administered and ACT of more than 250 was documented.  I used a 6 Stateless JR4 guide catheter to engage the ostium of right coronary artery.  A 0.014 run-through wire was then advanced past the lesion into the distal RCA.  I then advanced intravascular ultrasound catheter and performed a slow manual pullback from distal to mid RCA.  Reference vessel was 3 mm and at the lesion the vessel measured 1.2 mm.  Noncalcific fibro atherosclerotic 90% lesion was confirmed with IVUS visualization.  I then placed a 2.75 x 18 mm Xience don point stent which was postdilated with 3 x 15 mm noncompliant balloon at 16 tracy.  Final angiography showed 0% stenosis in the RCA with SASCHA-3 flow.    The catheters were exchanged over a wire and subsequently removed. The patient tolerated the procedure well without any complications. The pictures were reviewed at the end of the procedure. A TR band was applied.      [facility] HEMODYNAMICS:  LVp 98/7, 11 mmHg  AOp 95/69, 79 mmHg  No significant gradient on pullback.  LV gram was not performed due to recent echocardiogram on file.    [facility] HEMODYNAMICS:  RA 7/5, 5 mmHg  RV 31/4, 7 mmHg  PA 33/12, 22 mmHg  PCW 16/11, 12 mmHg  AO Sat 97%  PA Sat 70%    Jesus CO 4.37 liters per minute    Jesus CI 2.32 liters per minute per meter square    SVR: 1335 dynes-sec/cm5 (normal 700-1600)  PVR: 183 dynes-sec/cm5 (normal )    ESTIMATED BLOOD LOSS:  10 ml    COMPLICATIONS:  None    PROCEDURE DATA:  Contrast Used:40 cc  Fluoro Time: 3.12 mins  Sedation Time:    IMPRESSIONS  IVUS guided PCI to distal RCA with VELASQUEZ placement  LVEDP and wedge pressure are normal  Nonischemic cardiomyopathy (cardiomyopathy out of proportion to coronary  disease)  Mild pulmonary hypertension    RECOMMENDATIONS  -- Start dual antiplatelet therapy and high intensity statin  -- Uptitrate GDMT for cardiomyopathy  --Risk factor and lifestyle modifications  -- Stop IV diuretics and switch to oral    Electronically signed by Yaw Zavaleta MD, 02/04/24, 1:26 PM EST.          Assessment and Plan       Diagnoses and all orders for this visit:    1. Acute HFrEF (heart failure with reduced ejection fraction) (Primary)  -     losartan (Cozaar) 50 MG tablet; Take 1 tablet by mouth Daily.  Dispense: 90 tablet; Refill: 3    2. Chronic systolic CHF (congestive heart failure)    3. Presence of implantable cardioverter-defibrillator (ICD)    4. Coronary artery disease involving native coronary artery of native heart without angina pectoris    5. Nonrheumatic mitral valve regurgitation    6. Other hyperlipidemia    7. Primary hypertension    8. Hospital discharge follow-up    9. S/P drug eluting coronary stent placement    10. Mixed hyperlipidemia    Other orders  -     colchicine 0.6 MG tablet; Take 1 tablet by mouth Daily.  Dispense: 30 tablet; Refill: 0         Chronic systolic CHF (congestive heart failure) secondary to NICM (nonischemic cardiomyopathy)  Presence of implantable cardioverter-defibrillator (ICD)  Last echocardiogram showed LVEF 20-25 % with grade I diastolic dysfunction  Status post implantation of single-chamber Biotronik ICD with atrial sensing capabilities on 7/27/24  Patient appears euvolemic   Device check shows well-functioning device with no AT/AF.  Continue Lasix, Jardiance, Coreg, and losartan: Refills provided to the patient.  Patient advised to weigh himself daily. He will notify us if he gains 2-3 lbs in 1 day or 5 lbs in 1 week.   BMI is 28.9.  He weighs 187 pounds.     Nonrheumatic mitral valve regurgitation  Mild per last echocardiogram      Coronary artery disease involving native coronary artery of native heart without angina pectoris  History of VELASQUEZ  stent placement to distal RCA on 2/4/24  Continue aspirin, Brilinta, and high intensity statin  Continue beta blocker.     Other hyperlipidemia  Continue rosuvastatin      Primary hypertension, chronic  Blood pressure is elevated, but patient states he hasn't taken his medications today  Continue carvedilol, Lasix, and losartan   Patient advised to monitor his blood pressure daily and keep a log  Increase losartan to 100 mg p.o. daily  Refills provided     Gout  On colchicine    Patient has not been able to afford his medications and has been without Jardiance, Brilinta.  I have refilled all his medications and provided education regarding compliance with medications.  Disability paperwork was also filled during this visit.

## 2025-01-15 ENCOUNTER — OFFICE VISIT (OUTPATIENT)
Dept: CARDIOLOGY | Facility: CLINIC | Age: 60
End: 2025-01-15
Payer: COMMERCIAL

## 2025-01-15 VITALS
WEIGHT: 187 LBS | OXYGEN SATURATION: 98 % | BODY MASS INDEX: 28.34 KG/M2 | DIASTOLIC BLOOD PRESSURE: 104 MMHG | HEIGHT: 68 IN | SYSTOLIC BLOOD PRESSURE: 167 MMHG | HEART RATE: 72 BPM

## 2025-01-15 DIAGNOSIS — I25.10 CORONARY ARTERY DISEASE INVOLVING NATIVE CORONARY ARTERY OF NATIVE HEART WITHOUT ANGINA PECTORIS: ICD-10-CM

## 2025-01-15 DIAGNOSIS — I50.22 CHRONIC SYSTOLIC CHF (CONGESTIVE HEART FAILURE): ICD-10-CM

## 2025-01-15 DIAGNOSIS — I34.0 NONRHEUMATIC MITRAL VALVE REGURGITATION: ICD-10-CM

## 2025-01-15 DIAGNOSIS — E78.2 MIXED HYPERLIPIDEMIA: ICD-10-CM

## 2025-01-15 DIAGNOSIS — I10 PRIMARY HYPERTENSION: ICD-10-CM

## 2025-01-15 DIAGNOSIS — E78.49 OTHER HYPERLIPIDEMIA: ICD-10-CM

## 2025-01-15 DIAGNOSIS — Z95.810 PRESENCE OF IMPLANTABLE CARDIOVERTER-DEFIBRILLATOR (ICD): ICD-10-CM

## 2025-01-15 DIAGNOSIS — Z95.5 S/P DRUG ELUTING CORONARY STENT PLACEMENT: ICD-10-CM

## 2025-01-15 DIAGNOSIS — I50.21 ACUTE HFREF (HEART FAILURE WITH REDUCED EJECTION FRACTION): Primary | ICD-10-CM

## 2025-01-15 DIAGNOSIS — Z09 HOSPITAL DISCHARGE FOLLOW-UP: ICD-10-CM

## 2025-01-15 RX ORDER — CARVEDILOL 6.25 MG/1
6.25 TABLET ORAL 2 TIMES DAILY WITH MEALS
Qty: 180 TABLET | Refills: 3 | Status: SHIPPED | OUTPATIENT
Start: 2025-01-15

## 2025-01-15 RX ORDER — SILDENAFIL 50 MG/1
TABLET, FILM COATED ORAL
Qty: 12 TABLET | Refills: 3 | Status: SHIPPED | OUTPATIENT
Start: 2025-01-15

## 2025-01-15 RX ORDER — LOSARTAN POTASSIUM 100 MG/1
100 TABLET ORAL DAILY
Qty: 90 TABLET | Refills: 3 | Status: SHIPPED | OUTPATIENT
Start: 2025-01-15

## 2025-01-15 RX ORDER — FUROSEMIDE 20 MG/1
20 TABLET ORAL 2 TIMES DAILY
Qty: 180 TABLET | Refills: 3 | Status: SHIPPED | OUTPATIENT
Start: 2025-01-15

## 2025-01-15 RX ORDER — POTASSIUM CHLORIDE 750 MG/1
10 TABLET, EXTENDED RELEASE ORAL 2 TIMES DAILY
Qty: 180 TABLET | Refills: 1 | Status: SHIPPED | OUTPATIENT
Start: 2025-01-15

## 2025-01-15 RX ORDER — ROSUVASTATIN CALCIUM 10 MG/1
10 TABLET, COATED ORAL NIGHTLY
Qty: 90 TABLET | Refills: 3 | Status: SHIPPED | OUTPATIENT
Start: 2025-01-15

## 2025-01-15 RX ORDER — COLCHICINE 0.6 MG/1
0.6 TABLET ORAL DAILY
Qty: 30 TABLET | Refills: 0 | Status: SHIPPED | OUTPATIENT
Start: 2025-01-15

## 2025-01-15 RX ORDER — ASPIRIN 81 MG/1
81 TABLET ORAL DAILY
Qty: 90 TABLET | Refills: 3 | Status: SHIPPED | OUTPATIENT
Start: 2025-01-15

## 2025-01-15 RX ORDER — COLCHICINE 0.6 MG/1
0.6 TABLET ORAL DAILY
Qty: 30 TABLET | Refills: 0 | Status: SHIPPED | OUTPATIENT
Start: 2025-01-15 | End: 2025-01-15 | Stop reason: SDUPTHER

## 2025-01-15 RX ORDER — LOSARTAN POTASSIUM 50 MG/1
50 TABLET ORAL DAILY
Qty: 90 TABLET | Refills: 3 | Status: SHIPPED | OUTPATIENT
Start: 2025-01-15 | End: 2025-01-15

## 2025-07-16 LAB
MC_CV_MDC_IDC_RATE_1: 162
MC_CV_MDC_IDC_RATE_1: 182
MC_CV_MDC_IDC_RATE_1: 200
MC_CV_MDC_IDC_SHOCK_MEASURED_IMPEDANCE: 81
MC_CV_MDC_IDC_THERAPIES: NORMAL
MC_CV_MDC_IDC_THERAPIES: NORMAL
MC_CV_MDC_IDC_ZONE_ID: 10
MC_CV_MDC_IDC_ZONE_ID: 7
MC_CV_MDC_IDC_ZONE_ID: 8
MC_CV_MDC_IDC_ZONE_ID: 9
MDC_IDC_MSMT_BATTERY_REMAINING_PERCENTAGE: 100 %
MDC_IDC_MSMT_BATTERY_RRT_TRIGGER: 2.85
MDC_IDC_MSMT_BATTERY_STATUS: NORMAL
MDC_IDC_MSMT_BATTERY_VOLTAGE: 3.11
MDC_IDC_MSMT_CAP_CHARGE_TIME: 8.8
MDC_IDC_MSMT_LEADCHNL_RA_DTM: NORMAL
MDC_IDC_MSMT_LEADCHNL_RA_SENSING_INTR_AMPL: 5.1
MDC_IDC_MSMT_LEADCHNL_RV_DTM: NORMAL
MDC_IDC_MSMT_LEADCHNL_RV_IMPEDANCE_VALUE: 541
MDC_IDC_MSMT_LEADCHNL_RV_PACING_THRESHOLD_AMPLITUDE: 0.7
MDC_IDC_MSMT_LEADCHNL_RV_PACING_THRESHOLD_POLARITY: NORMAL
MDC_IDC_MSMT_LEADCHNL_RV_PACING_THRESHOLD_PULSEWIDTH: 0.4
MDC_IDC_MSMT_LEADCHNL_RV_SENSING_INTR_AMPL: 20
MDC_IDC_PG_IMPLANT_DTM: NORMAL
MDC_IDC_PG_MFG: NORMAL
MDC_IDC_PG_MODEL: NORMAL
MDC_IDC_PG_SERIAL: NORMAL
MDC_IDC_PG_TYPE: NORMAL
MDC_IDC_SESS_DTM: NORMAL
MDC_IDC_SESS_TYPE: NORMAL
MDC_IDC_SET_BRADY_LOWRATE: 50
MDC_IDC_SET_BRADY_MODE: NORMAL
MDC_IDC_SET_LEADCHNL_RA_SENSING_POLARITY: NORMAL
MDC_IDC_SET_LEADCHNL_RA_SENSING_SENSITIVITY: 0.5
MDC_IDC_SET_LEADCHNL_RV_PACING_AMPLITUDE: 1.7
MDC_IDC_SET_LEADCHNL_RV_PACING_POLARITY: NORMAL
MDC_IDC_SET_LEADCHNL_RV_PACING_PULSEWIDTH: 0.4
MDC_IDC_SET_LEADCHNL_RV_SENSING_POLARITY: NORMAL
MDC_IDC_SET_LEADCHNL_RV_SENSING_SENSITIVITY: 0.8
MDC_IDC_SET_ZONE_STATUS: NORMAL
MDC_IDC_SET_ZONE_TYPE: NORMAL
MDC_IDC_STAT_AT_BURDEN_PERCENT: 0
MDC_IDC_STAT_BRADY_RV_PERCENT_PACED: 0
MDC_IDC_STAT_TACHYTHERAPY_ATP_DELIVERED_RECENT: 0
MDC_IDC_STAT_TACHYTHERAPY_SHOCKS_ABORTED_RECENT: 0
MDC_IDC_STAT_TACHYTHERAPY_SHOCKS_DELIVERED_RECENT: 0

## 2025-08-07 ENCOUNTER — OFFICE VISIT (OUTPATIENT)
Dept: CARDIOLOGY | Facility: CLINIC | Age: 60
End: 2025-08-07
Payer: COMMERCIAL

## 2025-08-07 ENCOUNTER — CLINICAL SUPPORT NO REQUIREMENTS (OUTPATIENT)
Dept: CARDIOLOGY | Facility: CLINIC | Age: 60
End: 2025-08-07
Payer: COMMERCIAL

## 2025-08-07 VITALS
HEART RATE: 87 BPM | BODY MASS INDEX: 27.28 KG/M2 | HEIGHT: 68 IN | SYSTOLIC BLOOD PRESSURE: 153 MMHG | WEIGHT: 180 LBS | OXYGEN SATURATION: 97 % | DIASTOLIC BLOOD PRESSURE: 63 MMHG

## 2025-08-07 DIAGNOSIS — Z95.810 PRESENCE OF IMPLANTABLE CARDIOVERTER-DEFIBRILLATOR (ICD): ICD-10-CM

## 2025-08-07 DIAGNOSIS — I50.22 CHRONIC SYSTOLIC CHF (CONGESTIVE HEART FAILURE): Primary | ICD-10-CM

## 2025-08-07 DIAGNOSIS — E78.5 HYPERLIPIDEMIA, UNSPECIFIED HYPERLIPIDEMIA TYPE: Chronic | ICD-10-CM

## 2025-08-07 DIAGNOSIS — I42.8 NICM (NONISCHEMIC CARDIOMYOPATHY): ICD-10-CM

## 2025-08-07 DIAGNOSIS — E66.3 OVERWEIGHT WITH BODY MASS INDEX (BMI) OF 25 TO 25.9 IN ADULT: ICD-10-CM

## 2025-08-07 DIAGNOSIS — R06.02 SHORTNESS OF BREATH: ICD-10-CM

## 2025-08-07 DIAGNOSIS — I25.10 CORONARY ARTERY DISEASE INVOLVING NATIVE CORONARY ARTERY OF NATIVE HEART WITHOUT ANGINA PECTORIS: ICD-10-CM

## 2025-08-07 DIAGNOSIS — Z91.89 AT RISK FOR FLUID VOLUME OVERLOAD: ICD-10-CM

## 2025-08-07 DIAGNOSIS — I10 PRIMARY HYPERTENSION: Chronic | ICD-10-CM

## 2025-08-07 DIAGNOSIS — I34.0 NONRHEUMATIC MITRAL VALVE REGURGITATION: ICD-10-CM

## 2025-08-19 LAB
MC_CV_MDC_IDC_RATE_1: 162
MC_CV_MDC_IDC_RATE_1: 182
MC_CV_MDC_IDC_RATE_1: 200
MC_CV_MDC_IDC_SHOCK_MEASURED_IMPEDANCE: 79
MC_CV_MDC_IDC_THERAPIES: NORMAL
MC_CV_MDC_IDC_THERAPIES: NORMAL
MC_CV_MDC_IDC_ZONE_ID: 10
MC_CV_MDC_IDC_ZONE_ID: 7
MC_CV_MDC_IDC_ZONE_ID: 8
MC_CV_MDC_IDC_ZONE_ID: 9
MDC_IDC_MSMT_BATTERY_REMAINING_PERCENTAGE: 100 %
MDC_IDC_MSMT_BATTERY_RRT_TRIGGER: 2.85
MDC_IDC_MSMT_BATTERY_STATUS: NORMAL
MDC_IDC_MSMT_BATTERY_VOLTAGE: 3.12
MDC_IDC_MSMT_CAP_CHARGE_TIME: 8.8
MDC_IDC_MSMT_LEADCHNL_RA_DTM: NORMAL
MDC_IDC_MSMT_LEADCHNL_RA_SENSING_INTR_AMPL: 5.3
MDC_IDC_MSMT_LEADCHNL_RV_DTM: NORMAL
MDC_IDC_MSMT_LEADCHNL_RV_IMPEDANCE_VALUE: 541
MDC_IDC_MSMT_LEADCHNL_RV_PACING_THRESHOLD_AMPLITUDE: 0.7
MDC_IDC_MSMT_LEADCHNL_RV_PACING_THRESHOLD_POLARITY: NORMAL
MDC_IDC_MSMT_LEADCHNL_RV_PACING_THRESHOLD_PULSEWIDTH: 0.4
MDC_IDC_MSMT_LEADCHNL_RV_SENSING_INTR_AMPL: 20
MDC_IDC_PG_IMPLANT_DTM: NORMAL
MDC_IDC_PG_MFG: NORMAL
MDC_IDC_PG_MODEL: NORMAL
MDC_IDC_PG_SERIAL: NORMAL
MDC_IDC_PG_TYPE: NORMAL
MDC_IDC_SESS_DTM: NORMAL
MDC_IDC_SESS_TYPE: NORMAL
MDC_IDC_SET_BRADY_LOWRATE: 50
MDC_IDC_SET_BRADY_MODE: NORMAL
MDC_IDC_SET_LEADCHNL_RA_SENSING_POLARITY: NORMAL
MDC_IDC_SET_LEADCHNL_RA_SENSING_SENSITIVITY: 0.5
MDC_IDC_SET_LEADCHNL_RV_PACING_AMPLITUDE: 1.7
MDC_IDC_SET_LEADCHNL_RV_PACING_POLARITY: NORMAL
MDC_IDC_SET_LEADCHNL_RV_PACING_PULSEWIDTH: 0.4
MDC_IDC_SET_LEADCHNL_RV_SENSING_POLARITY: NORMAL
MDC_IDC_SET_LEADCHNL_RV_SENSING_SENSITIVITY: 0.8
MDC_IDC_SET_ZONE_STATUS: NORMAL
MDC_IDC_SET_ZONE_TYPE: NORMAL
MDC_IDC_STAT_AT_BURDEN_PERCENT: 0
MDC_IDC_STAT_BRADY_RV_PERCENT_PACED: 0
MDC_IDC_STAT_TACHYTHERAPY_ATP_DELIVERED_RECENT: 0
MDC_IDC_STAT_TACHYTHERAPY_SHOCKS_ABORTED_RECENT: 0
MDC_IDC_STAT_TACHYTHERAPY_SHOCKS_DELIVERED_RECENT: 0

## (undated) DEVICE — PAPR PRNT PK SONY W RIBN UPC55

## (undated) DEVICE — GW RUNTHROUGH NS STR RESHP .014 3X180CM

## (undated) DEVICE — Device

## (undated) DEVICE — BOWL PLSTC MD 16OZ BLU STRL

## (undated) DEVICE — CABL BIPOL W/ALLGTR CLIP/SM 12FT

## (undated) DEVICE — DRAPE,FEM ANGIO,2 WIN,STERILE: Brand: MEDLINE

## (undated) DEVICE — PENCL SMOKE/EVAC MEGADYNE TELESCP 10FT

## (undated) DEVICE — UNDYED BRAIDED (POLYGLACTIN 910), SYNTHETIC ABSORBABLE SUTURE: Brand: COATED VICRYL

## (undated) DEVICE — IMMOB SHLDR CUT/AWAY UNIV

## (undated) DEVICE — GOWN,REINFORCE,POLY,SIRUS,BREATH SLV,XLG: Brand: MEDLINE

## (undated) DEVICE — GW EMR FIX EXCHG J STD .035 3MM 260CM

## (undated) DEVICE — PK TRY HEART CATH 50

## (undated) DEVICE — PK PROC TURNOVER

## (undated) DEVICE — GLV SURG TRIUMPH LT PF LTX 8 STRL

## (undated) DEVICE — INTRO GLIDESHEATH SLENDER AKIT 20GA .025 6F 10CM

## (undated) DEVICE — DEV INFL COMPAK W/ACCESSPLUS IN4530

## (undated) DEVICE — SUT VIC 2/0 SH 27IN

## (undated) DEVICE — CATH IMG IVUS EAGLE EYE PLATIN RX DIGITAL .014IN 5FR

## (undated) DEVICE — GLV SURG TRIUMPH GREEN W/ALOE PF LTX 8.5 STRL

## (undated) DEVICE — SHEATH INTRO PINN CORNRY .038 7F10CM

## (undated) DEVICE — SNAR POLYP CAPTIVATOR HEX 27MM 240CM

## (undated) DEVICE — ST ACC MICROPUNCTURE STFF/CANN PLAT/TP 4F 21G 40CM

## (undated) DEVICE — DRAPE,INSTRUMENT,MAGNETIC,10X16: Brand: MEDLINE

## (undated) DEVICE — PK ENDO GI 50

## (undated) DEVICE — GOWN,REINFRCE,POLY,SIRUS,BREATH SLV,XXLG: Brand: MEDLINE

## (undated) DEVICE — PAD E/S GRND SGL/FOIL 9FT/CORD DISP

## (undated) DEVICE — CVR PROB ULTRASND W/GEL STRL 5X96IN

## (undated) DEVICE — DEV COMPR RAD TR BND REG 24CM

## (undated) DEVICE — PACEMAKER CDS: Brand: MEDLINE INDUSTRIES, INC.

## (undated) DEVICE — SUCTION CANISTER, 1500CC,SAFELINER: Brand: DEROYAL

## (undated) DEVICE — DECANTER: Brand: UNBRANDED

## (undated) DEVICE — ELECTRD DEFIB M/FUNC PROPADZ RADIOL 2PK

## (undated) DEVICE — 3M™ IOBAN™ 2 ANTIMICROBIAL INCISE DRAPE 6650EZ: Brand: IOBAN™ 2

## (undated) DEVICE — SOL IRRIG H2O 1000ML STRL

## (undated) DEVICE — INTRO SHEATH PRELUDE SNAP .038 8F 13CM W/SDPRT

## (undated) DEVICE — DRSNG TELFA PAD NONADH STR 1S 3X4IN

## (undated) DEVICE — SUT VIC 3/0 SH 27IN J416H

## (undated) DEVICE — TRAP WIDEEYE POLYP